# Patient Record
Sex: MALE | Race: WHITE | Employment: OTHER | URBAN - NONMETROPOLITAN AREA
[De-identification: names, ages, dates, MRNs, and addresses within clinical notes are randomized per-mention and may not be internally consistent; named-entity substitution may affect disease eponyms.]

---

## 2019-02-22 LAB
AVERAGE GLUCOSE: NORMAL
HBA1C MFR BLD: 7.1 %

## 2019-05-03 LAB
AVERAGE GLUCOSE: NORMAL
HBA1C MFR BLD: 6.8 %

## 2019-08-30 LAB
ALBUMIN SERPL-MCNC: NORMAL G/DL
ALP BLD-CCNC: NORMAL U/L
ALT SERPL-CCNC: NORMAL U/L
ANION GAP SERPL CALCULATED.3IONS-SCNC: NORMAL MMOL/L
AST SERPL-CCNC: NORMAL U/L
AVERAGE GLUCOSE: NORMAL
BILIRUB SERPL-MCNC: NORMAL MG/DL
BUN BLDV-MCNC: NORMAL MG/DL
CALCIUM SERPL-MCNC: NORMAL MG/DL
CHLORIDE BLD-SCNC: NORMAL MMOL/L
CO2: NORMAL
CREAT SERPL-MCNC: NORMAL MG/DL
GFR CALCULATED: NORMAL
GLUCOSE BLD-MCNC: NORMAL MG/DL
HBA1C MFR BLD: 6.4 %
POTASSIUM SERPL-SCNC: NORMAL MMOL/L
SODIUM BLD-SCNC: NORMAL MMOL/L
TOTAL PROTEIN: NORMAL

## 2019-12-13 LAB
AVERAGE GLUCOSE: NORMAL
CREATININE, URINE: NORMAL
HBA1C MFR BLD: 6.5 %
MICROALBUMIN/CREAT 24H UR: 3.3 MG/G{CREAT}
MICROALBUMIN/CREAT UR-RTO: NORMAL
PROSTATE SPECIFIC ANTIGEN: NORMAL

## 2020-03-12 LAB
ALBUMIN SERPL-MCNC: NORMAL G/DL
ALP BLD-CCNC: NORMAL U/L
ALT SERPL-CCNC: NORMAL U/L
ANION GAP SERPL CALCULATED.3IONS-SCNC: NORMAL MMOL/L
AST SERPL-CCNC: NORMAL U/L
AVERAGE GLUCOSE: NORMAL
BILIRUB SERPL-MCNC: NORMAL MG/DL
BUN BLDV-MCNC: NORMAL MG/DL
CALCIUM SERPL-MCNC: NORMAL MG/DL
CHLORIDE BLD-SCNC: NORMAL MMOL/L
CHOLESTEROL, TOTAL: NORMAL
CHOLESTEROL/HDL RATIO: NORMAL
CO2: NORMAL
CREAT SERPL-MCNC: NORMAL MG/DL
GFR CALCULATED: NORMAL
GLUCOSE BLD-MCNC: NORMAL MG/DL
HBA1C MFR BLD: 6.2 %
HDLC SERPL-MCNC: NORMAL MG/DL
LDL CHOLESTEROL CALCULATED: NORMAL
POTASSIUM SERPL-SCNC: NORMAL MMOL/L
SODIUM BLD-SCNC: NORMAL MMOL/L
TOTAL PROTEIN: NORMAL
TRIGL SERPL-MCNC: NORMAL MG/DL
VLDLC SERPL CALC-MCNC: NORMAL MG/DL

## 2020-06-04 LAB
BUN BLDV-MCNC: NORMAL MG/DL
CALCIUM SERPL-MCNC: NORMAL MG/DL
CHLORIDE BLD-SCNC: NORMAL MMOL/L
CO2: NORMAL
CREAT SERPL-MCNC: NORMAL MG/DL
GFR CALCULATED: NORMAL
GLUCOSE BLD-MCNC: NORMAL MG/DL
MAGNESIUM: NORMAL
POTASSIUM SERPL-SCNC: NORMAL MMOL/L
SODIUM BLD-SCNC: NORMAL MMOL/L

## 2020-06-09 RX ORDER — ASPIRIN/CALCIUM/MAG/ALUMINUM 325 MG
1 TABLET ORAL DAILY
COMMUNITY

## 2020-06-09 RX ORDER — ACETAMINOPHEN 325 MG/1
325 TABLET ORAL 3 TIMES DAILY
COMMUNITY

## 2020-06-09 RX ORDER — AZELASTINE HYDROCHLORIDE 137 UG/1
2 SPRAY, METERED NASAL DAILY
COMMUNITY

## 2020-06-09 RX ORDER — TRAMADOL HYDROCHLORIDE 50 MG/1
50 TABLET ORAL 3 TIMES DAILY
COMMUNITY
End: 2020-08-19 | Stop reason: SDUPTHER

## 2020-06-09 RX ORDER — DOCUSATE SODIUM 100 MG/1
100 CAPSULE, LIQUID FILLED ORAL
COMMUNITY

## 2020-06-09 RX ORDER — ALLOPURINOL 100 MG/1
100 TABLET ORAL DAILY
COMMUNITY

## 2020-06-09 RX ORDER — MENTHOL AND ZINC OXIDE .44; 20.625 G/100G; G/100G
1 OINTMENT TOPICAL 2 TIMES DAILY
COMMUNITY

## 2020-06-09 RX ORDER — ATORVASTATIN CALCIUM 20 MG/1
20 TABLET, FILM COATED ORAL DAILY
COMMUNITY

## 2020-06-09 RX ORDER — ENALAPRIL MALEATE 10 MG/1
10 TABLET ORAL DAILY
COMMUNITY

## 2020-06-09 RX ORDER — CLOTRIMAZOLE AND BETAMETHASONE DIPROPIONATE 10; .64 MG/G; MG/G
1 CREAM TOPICAL 2 TIMES DAILY
COMMUNITY
End: 2020-08-05 | Stop reason: ALTCHOICE

## 2020-06-09 RX ORDER — LEVOTHYROXINE SODIUM 0.1 MG/1
100 TABLET ORAL DAILY
COMMUNITY

## 2020-06-09 RX ORDER — FLUOXETINE HYDROCHLORIDE 40 MG/1
40 CAPSULE ORAL DAILY
COMMUNITY

## 2020-06-09 RX ORDER — OXYBUTYNIN CHLORIDE 5 MG/1
5 TABLET, EXTENDED RELEASE ORAL NIGHTLY
COMMUNITY

## 2020-06-09 RX ORDER — AMLODIPINE BESYLATE 10 MG/1
10 TABLET ORAL DAILY
COMMUNITY

## 2020-06-09 RX ORDER — FLUTICASONE PROPIONATE 50 MCG
2 SPRAY, SUSPENSION (ML) NASAL DAILY
COMMUNITY
End: 2021-08-09

## 2020-06-09 RX ORDER — FLUOXETINE HYDROCHLORIDE 20 MG/1
20 CAPSULE ORAL DAILY
COMMUNITY
End: 2022-10-04

## 2020-07-21 ENCOUNTER — TELEPHONE (OUTPATIENT)
Dept: FAMILY MEDICINE CLINIC | Age: 75
End: 2020-07-21

## 2020-07-21 RX ORDER — TRAMADOL HYDROCHLORIDE 50 MG/1
50 TABLET ORAL EVERY 8 HOURS PRN
Qty: 93 TABLET | Refills: 0 | Status: SHIPPED
Start: 2020-07-21 | End: 2020-07-21

## 2020-07-21 RX ORDER — TRAMADOL HYDROCHLORIDE 50 MG/1
50 TABLET ORAL EVERY 8 HOURS PRN
Qty: 93 TABLET | Refills: 3 | Status: SHIPPED
Start: 2020-07-21 | End: 2020-08-05

## 2020-08-05 ENCOUNTER — OFFICE VISIT (OUTPATIENT)
Dept: FAMILY MEDICINE CLINIC | Age: 75
End: 2020-08-05
Payer: MEDICARE

## 2020-08-05 VITALS
BODY MASS INDEX: 51.16 KG/M2 | HEART RATE: 90 BPM | DIASTOLIC BLOOD PRESSURE: 70 MMHG | HEIGHT: 61 IN | WEIGHT: 271 LBS | TEMPERATURE: 97.8 F | RESPIRATION RATE: 20 BRPM | OXYGEN SATURATION: 98 % | SYSTOLIC BLOOD PRESSURE: 128 MMHG

## 2020-08-05 PROBLEM — R26.9 UNSPECIFIED ABNORMALITIES OF GAIT AND MOBILITY: Status: ACTIVE | Noted: 2017-02-14

## 2020-08-05 PROBLEM — R94.31 ABNORMAL ELECTROCARDIOGRAM (ECG) (EKG): Status: ACTIVE | Noted: 2017-04-12

## 2020-08-05 PROCEDURE — G8510 SCR DEP NEG, NO PLAN REQD: HCPCS | Performed by: NURSE PRACTITIONER

## 2020-08-05 PROCEDURE — 99214 OFFICE O/P EST MOD 30 MIN: CPT | Performed by: NURSE PRACTITIONER

## 2020-08-05 PROCEDURE — 3288F FALL RISK ASSESSMENT DOCD: CPT | Performed by: NURSE PRACTITIONER

## 2020-08-05 RX ORDER — BETAMETHASONE DIPROPIONATE 0.5 MG/G
CREAM TOPICAL
Qty: 1 TUBE | Refills: 3 | Status: SHIPPED | OUTPATIENT
Start: 2020-08-05

## 2020-08-05 RX ORDER — SEMAGLUTIDE 1.34 MG/ML
0.5 INJECTION, SOLUTION SUBCUTANEOUS WEEKLY
COMMUNITY
Start: 2020-07-16

## 2020-08-05 SDOH — HEALTH STABILITY: MENTAL HEALTH: HOW OFTEN DO YOU HAVE A DRINK CONTAINING ALCOHOL?: NEVER

## 2020-08-05 ASSESSMENT — ENCOUNTER SYMPTOMS
SHORTNESS OF BREATH: 0
NAUSEA: 0
CONSTIPATION: 0
VOICE CHANGE: 0
DIARRHEA: 0
WHEEZING: 0
COUGH: 0
ABDOMINAL PAIN: 0
BACK PAIN: 0
VOMITING: 0
CHEST TIGHTNESS: 0
TROUBLE SWALLOWING: 0
BLOOD IN STOOL: 0

## 2020-08-05 ASSESSMENT — PATIENT HEALTH QUESTIONNAIRE - PHQ9
SUM OF ALL RESPONSES TO PHQ QUESTIONS 1-9: 0
SUM OF ALL RESPONSES TO PHQ QUESTIONS 1-9: 0
SUM OF ALL RESPONSES TO PHQ9 QUESTIONS 1 & 2: 0
2. FEELING DOWN, DEPRESSED OR HOPELESS: 0
1. LITTLE INTEREST OR PLEASURE IN DOING THINGS: 0

## 2020-08-05 NOTE — PROGRESS NOTES
tablet, Take 100 mcg by mouth Daily, Disp: , Rfl:   with no evidence of weight gain, palpitations, fatigue, difficulty swallowing, neck prominence or hair loss. Is better on medications and worsens with forgetting to take medications. Has been on medications religiously since last visit. Depression/Anxiety/PBA: Stable depression with generalized anxiety. Mild in intensity but well controlled on   Current Outpatient Medications:    acetaminophen (TYLENOL) 325 MG tablet, Take 650 mg by mouth 3 times daily, Disp: , Rfl:    traMADol (ULTRAM) 50 MG tablet, Take 50 mg by mouth 3 times daily. , Disp: , Rfl:    allopurinol (ZYLOPRIM) 100 MG tablet, Take 100 mg by mouth daily, Disp: , Rfl:    FLUoxetine (PROZAC) 40 MG capsule, Take 40 mg by mouth daily, Disp: , Rfl:   FLUoxetine (PROZAC) 20 MG capsule, Take 20 mg by mouth daily, Disp: , Rfl:   without symptoms, no weight gain, no increase in anxiety, no suicidal or homicidal ideation's no unexplained fatigue, or relationship difficulties relayed this visit. Review of Systems   Constitutional: Negative for activity change, chills, diaphoresis, fatigue, fever and unexpected weight change. HENT: Negative for trouble swallowing and voice change. Eyes: Negative for visual disturbance. Respiratory: Negative for cough, chest tightness, shortness of breath and wheezing. Cardiovascular: Negative for chest pain, palpitations and leg swelling. Gastrointestinal: Negative for abdominal pain, blood in stool, constipation, diarrhea, nausea and vomiting. Endocrine: Negative for polydipsia, polyphagia and polyuria. Genitourinary: Negative for dysuria, enuresis, frequency and hematuria. Musculoskeletal: Negative for arthralgias, back pain, gait problem, joint swelling, myalgias and neck stiffness. Skin: Negative for rash. Neurological: Negative for dizziness, seizures, syncope, facial asymmetry, weakness, light-headedness, numbness and headaches. Hematological: Does not bruise/bleed easily. Psychiatric/Behavioral: Negative for behavioral problems, confusion, hallucinations and suicidal ideas. The patient is not nervous/anxious. Current Outpatient Medications:     OZEMPIC, 0.25 OR 0.5 MG/DOSE, 2 MG/1.5ML SOPN, , Disp: , Rfl:     betamethasone dipropionate (DIPROLENE) 0.05 % cream, Apply topically 2 times daily. PRN for 14 days and then take 7 day break, Disp: 1 Tube, Rfl: 3    Azelastine HCl 137 MCG/SPRAY SOLN, 2 sprays by Nasal route daily, Disp: , Rfl:     atorvastatin (LIPITOR) 20 MG tablet, Take 20 mg by mouth daily, Disp: , Rfl:     levothyroxine (SYNTHROID) 100 MCG tablet, Take 100 mcg by mouth Daily, Disp: , Rfl:     acetaminophen (TYLENOL) 325 MG tablet, Take 650 mg by mouth 3 times daily, Disp: , Rfl:     traMADol (ULTRAM) 50 MG tablet, Take 50 mg by mouth 3 times daily. , Disp: , Rfl:     amLODIPine (NORVASC) 10 MG tablet, Take 10 mg by mouth daily, Disp: , Rfl:     enalapril (VASOTEC) 10 MG tablet, Take 10 mg by mouth daily, Disp: , Rfl:     allopurinol (ZYLOPRIM) 100 MG tablet, Take 100 mg by mouth daily, Disp: , Rfl:     FLUoxetine (PROZAC) 40 MG capsule, Take 40 mg by mouth daily, Disp: , Rfl:     fluticasone (FLONASE) 50 MCG/ACT nasal spray, 2 sprays by Each Nostril route daily, Disp: , Rfl:     oxybutynin (DITROPAN-XL) 5 MG extended release tablet, Take 5 mg by mouth nightly, Disp: , Rfl:     docusate sodium (COLACE) 100 MG capsule, Take 100 mg by mouth 1 CAPSULE EVERY MORNING AND 2 CAPSULES NIGHTLY, Disp: , Rfl:     Aspirin Buf,QlMpa-JrVck-UrJto, (BUFFERED ASPIRIN) 325 MG TABS, Take 1 tablet by mouth daily, Disp: , Rfl:     FLUoxetine (PROZAC) 20 MG capsule, Take 20 mg by mouth daily, Disp: , Rfl:     diclofenac sodium (VOLTAREN) 1 % GEL, Apply 4 g topically 4 times daily, Disp: , Rfl:     menthol-zinc oxide (CALMOSEPTINE) 0.44-20.625 % OINT ointment, Apply 1 applicator topically 2 times daily Max 30 ml per day. , Disp: , Rfl:   No Known Allergies    Past Medical History:   Diagnosis Date    Ankle sprain     LEFT    Chronic kidney disease (CKD), stage III (moderate) (HCC)     Constipation     Contusion of knee     Diabetes mellitus (HCC)     DJD (degenerative joint disease)     Encounter for long-term (current) use of other medications     Epidermal cyst     Facies suggestive of Down syndrome     Hearing impaired     Hyperlipidemia     Hypertension     Hypothyroid     Hypothyroid     Knee pain     Mental deficiency     Obesity     Otitis externa     Otitis media     Urge urinary incontinence      History reviewed. No pertinent surgical history. History reviewed. No pertinent family history.   Social History     Socioeconomic History    Marital status: Single     Spouse name: Not on file    Number of children: Not on file    Years of education: Not on file    Highest education level: Not on file   Occupational History    Not on file   Social Needs    Financial resource strain: Not on file    Food insecurity     Worry: Not on file     Inability: Not on file    Transportation needs     Medical: Not on file     Non-medical: Not on file   Tobacco Use    Smoking status: Never Smoker    Smokeless tobacco: Never Used   Substance and Sexual Activity    Alcohol use: Never     Frequency: Never    Drug use: Not on file    Sexual activity: Not on file   Lifestyle    Physical activity     Days per week: Not on file     Minutes per session: Not on file    Stress: Not on file   Relationships    Social connections     Talks on phone: Not on file     Gets together: Not on file     Attends [employer] service: Not on file     Active member of club or organization: Not on file     Attends meetings of clubs or organizations: Not on file     Relationship status: Not on file    Intimate partner violence     Fear of current or ex partner: Not on file     Emotionally abused: Not on file     Physically abused: Not on file     Forced sexual activity: Not on file   Other Topics Concern    Not on file   Social History Narrative    Not on file       Vitals:    08/05/20 0913   BP: 128/70   Pulse: 90   Resp: 20   Temp: 97.8 °F (36.6 °C)   TempSrc: Temporal   SpO2: 98%   Weight: 271 lb (122.9 kg)   Height: 5' 1\" (1.549 m)       Physical Exam  Vitals signs and nursing note reviewed. Constitutional:       Appearance: Normal appearance. HENT:      Head: Normocephalic. Right Ear: Tympanic membrane and ear canal normal. There is no impacted cerumen. Left Ear: Tympanic membrane and ear canal normal. There is no impacted cerumen. Nose: Nose normal.      Mouth/Throat:      Mouth: Mucous membranes are dry. Eyes:      Extraocular Movements: Extraocular movements intact. Pupils: Pupils are equal, round, and reactive to light. Neck:      Musculoskeletal: No neck rigidity or muscular tenderness. Vascular: No carotid bruit. Cardiovascular:      Rate and Rhythm: Normal rate and regular rhythm. Pulses: Normal pulses. Heart sounds: Normal heart sounds. No murmur. No friction rub. No gallop. Pulmonary:      Effort: Pulmonary effort is normal. No respiratory distress. Breath sounds: Normal breath sounds. No stridor. No wheezing, rhonchi or rales. Chest:      Chest wall: No tenderness. Abdominal:      General: Bowel sounds are normal. There is no distension. Palpations: Abdomen is soft. Musculoskeletal:         General: No swelling, tenderness, deformity or signs of injury. Right lower leg: No edema. Left lower leg: No edema. Lymphadenopathy:      Cervical: No cervical adenopathy. Skin:     General: Skin is warm and dry. Capillary Refill: Capillary refill takes 2 to 3 seconds. Findings: No bruising, lesion or rash. Neurological:      General: No focal deficit present. Mental Status: He is alert and oriented to person, place, and time.       Motor: Weakness present. Gait: Gait abnormal.      Comments: Uses a walker  Lower extremity weakness at times   Psychiatric:         Attention and Perception: Attention normal.         Mood and Affect: Mood normal.         Behavior: Behavior normal.         Thought Content: Thought content does not include homicidal or suicidal ideation. Thought content does not include homicidal or suicidal plan. Assessment and Plan:  Marbella Serna was seen today for hypertension. Diagnoses and all orders for this visit:    Type 2 diabetes mellitus with stage 3 chronic kidney disease, without long-term current use of insulin (HCC)  -     CBC Auto Differential; Future  -     Comprehensive Metabolic Panel; Future  -     Hemoglobin A1C; Future    Chronic bilateral low back pain without sciatica    Essential hypertension  -     TSH without Reflex; Future  -     Urinalysis; Future    Vitamin D deficiency    Stage 3 chronic kidney disease ([facility] [address]  Comments:  Continue expectant management  Orders:  -     Comprehensive Metabolic Panel; Future    Familial hypercholesterolemia  -     Lipid Panel; Future    Screening for prostate cancer  Comments:  Next PSA for after December 13, 2020  Orders:  -     PSA, Diagnostic; Future    Facies suggestive of Down syndrome    Other specified hypothyroidism    Class 3 severe obesity due to excess calories with serious comorbidity and body mass index (BMI) of 50.0 to 59.9 in [facility])  Comments:  Patient is on a low-fat low sodium low potassium diet    Primary osteoarthritis involving multiple joints  Comments:  Patient controlled on tramadol and as needed medications    Mental deficiency    Screening for colorectal cancer  Comments:  Ordered a Cologuard  Orders:  -     Cologuard (For External Results Only); Future    Dermatitis  Comments:   We will try him on betamethasone twice daily for 14 days if this does not improve the situation or if it worsens we will need to stop the betamethasone and add     Other orders  -     betamethasone dipropionate (DIPROLENE) 0.05 % cream; Apply topically 2 times daily. PRN for 14 days and then take 7 day break        Return in about 4 months (around 12/14/2020) for with labs . USPTF:    ([place] 2016) Colorectal Cancer: Screening --Adults aged 48 to 76 years  Grade: A (Recommended) recommends screening for colorectal cancer starting at age 48 years and continuing until age 76 years. (Not at risk) HIV: Screening - Adolescents and Adults  Grade: A (Recommended) recommends that clinicians screen for HIV infection in ages 13 to 72 years. (2020) High Blood Pressure: Screening and Home Monitoring -- Adults  Grade: A (Recommended) recommends screening for high blood pressure in ages 25 years or older. obtain measurements outside of the clinical setting for diagnostic confirmation before starting treatment. Annual screening for adults aged 36 years or older or those who are at increased risk for blood pressure    (2020)  Lipid Disorders in Adults: Screening -- Men 28 and Older  Grade: A (Recommended) recommends screening men aged 28 and older for lipid disorders. (Nondrinker) Alcohol Misuse: Screening and Behavioral Counseling Interventions in Primary Care -- Adults  Grade: B (Recommended) recommends that clinicians screen adults aged 25 years or older for alcohol misuse and provide persons engaged in risky or hazardous drinking with brief behavioral counseling interventions to reduce alcohol misuse. (2020) Abnormal Blood Glucose and Type 2 Diabetes Mellitus: Screening -- Adults aged 36 to 79 years who are overweight or obese Grade: B (Recommended)    (Never smoked) Abdominal Aortic Aneurysm: Screening -- Men Ages 72 to 76 Years Who Have Ever Smoked. Grade: B (Recommended) recommends one-time screening for abdominal aortic aneurysm (AAA) with ultrasonography in men ages 72 to 76 years who have ever smoked.      (Discussed today)  Obesity: Screening for and Management of-- All Adults  Grade: B(Recommended) recommends screening all adults for obesity. Clinicians should offer or refer patients with a body mass index (BMI) of 30 kg/m2 or higher to intensive, multicomponent behavioral interventions. (Never smoked)  Lung Cancer: Screening -- Adults Ages 46-80 who have a 30 pack-year smoking history and currently smoke or have quit within the past 15 years  Grade: B (Recommended) recommends annual screening for lung cancer with low-dose computed tomography (LDCT) in adults aged 54 to [de-identified] years who have a 30 pack-year smoking history and currently smoke or have quit within the past 15 years. Screening should be discontinued once a person has not smoked for 15 years or develops a health problem that substantially limits life expectancy or the ability or willingness to have curative lung surgery. (Not at high risk) Hepatitis C Virus Infection: Screening--Adults at High Risk and Adults born between 1945 and 1965  Grade: B (Recommended) recommends screening for hepatitis C virus (HCV) infection in persons at high risk for infection. The USPSTF also recommends offering 1-time screening for HCV infection to adults born between 80 and 1965. (High risk)  Fall Prevention -- Exercise/Physical Therapy: Community-dwelling Adults 72 Years or Older, Increased Risk for Falls   Grade: B (Recommended) recommends exercise or physical therapy to prevent falls in community-dwelling adults aged 72 years or older who are at increased risk for falls. (Negative)  Depression: Screening -- General adult population, including pregnant and postpartum women  Grade: B(Recommended) recommends screening for depression in the general adult population,  Screening should be implemented with adequate systems in place to ensure accurate diagnosis, effective treatment, and appropriate follow-up.     (2020) Glaucoma: Screening - Adults and Diabetic Eye Exam     (2020) Vitamin D Deficiency: Screening -- (2020) Thyroid Dysfunction: Screening --      (2020) Skin Cancer: Screening --Asymptomatic adults  Grade:  I(Uncertain)     (Done) Carotid Artery Stenosis: Screening -- Adults  Grade: D (Not Recommended)     (2020) Coronary Heart Disease: Screening with Electrocardiography--Adults at Low Risk  Grade: D (Not Recommended) echocardiogram done in 2017 along with a chemical stress test    (2019) Prostate Cancer: Prostate-Specific Antigen (PSA)-Based Screening -- All Men   PSA 1.8 December 13, 2019  PSA 2.0 November 28, 2018  PSA 1.9 September 2017  PSA 1.4 July 2017  PSA 1.2 July 2016         Seen By:  DARREN Javier - CNP

## 2020-08-19 RX ORDER — TRAMADOL HYDROCHLORIDE 50 MG/1
50 TABLET ORAL 3 TIMES DAILY
Qty: 90 TABLET | Refills: 0 | Status: SHIPPED
Start: 2020-08-19 | End: 2021-01-19 | Stop reason: SDUPTHER

## 2020-08-19 NOTE — PROGRESS NOTES
Called Sahra at [facility]. Pt. Has appt. With Dr Roach Mediate the end of this month as his 3 mo. Check up. But I did fax the Cologuard order to her so she has it if needed.

## 2020-08-28 ENCOUNTER — TELEPHONE (OUTPATIENT)
Dept: FAMILY MEDICINE CLINIC | Age: 75
End: 2020-08-28

## 2020-08-28 NOTE — TELEPHONE ENCOUNTER
I called Sahra (nurse) [phone] regarding overdue results for cologuard. Per Anaya she just got the order patient has to sign the form and she will return to the office and [facility] So I will extend the date.

## 2020-09-21 RX ORDER — TRAMADOL HYDROCHLORIDE 50 MG/1
50 TABLET ORAL 3 TIMES DAILY
COMMUNITY
End: 2020-09-21 | Stop reason: SDUPTHER

## 2020-09-21 RX ORDER — TRAMADOL HYDROCHLORIDE 50 MG/1
50 TABLET ORAL 3 TIMES DAILY
Qty: 90 TABLET | Refills: 0 | Status: SHIPPED
Start: 2020-09-21 | End: 2020-10-19 | Stop reason: SDUPTHER

## 2020-10-06 ENCOUNTER — TELEPHONE (OUTPATIENT)
Dept: FAMILY MEDICINE CLINIC | Age: 75
End: 2020-10-06

## 2020-10-06 NOTE — TELEPHONE ENCOUNTER
Spoke with Sahra (nurse) daquan is overdue and she stated he just had it done in January. That's correct 01- results abstracted in Replaced by [facility] [address]. Will cancel the order.

## 2020-10-19 RX ORDER — TRAMADOL HYDROCHLORIDE 50 MG/1
50 TABLET ORAL 3 TIMES DAILY
Qty: 90 TABLET | Refills: 0 | Status: SHIPPED
Start: 2020-10-19 | End: 2020-11-18 | Stop reason: SDUPTHER

## 2020-10-20 ENCOUNTER — HOSPITAL ENCOUNTER (OUTPATIENT)
Age: 75
Discharge: HOME | End: 2020-10-22
Payer: MEDICARE

## 2020-10-20 LAB
ANION GAP SERPL CALCULATED.3IONS-SCNC: 19 MMOL/L (ref 7–16)
BUN BLDV-MCNC: 23 MG/DL (ref 8–23)
CALCIUM SERPL-MCNC: 9.7 MG/DL (ref 8.6–10.2)
CHLORIDE BLD-SCNC: 107 MMOL/L (ref 98–107)
CO2: 18 MMOL/L (ref 22–29)
CREAT SERPL-MCNC: 1.6 MG/DL (ref 0.7–1.2)
GFR AFRICAN AMERICAN: 51
GFR NON-AFRICAN AMERICAN: 42 ML/MIN/1.73
GLUCOSE BLD-MCNC: 116 MG/DL (ref 74–99)
MAGNESIUM: 1.8 MG/DL (ref 1.6–2.6)
PHOSPHORUS: 4 MG/DL (ref 2.5–4.5)
POTASSIUM SERPL-SCNC: 5 MMOL/L (ref 3.5–5)
SODIUM BLD-SCNC: 144 MMOL/L (ref 132–146)
VITAMIN D 25-HYDROXY: 15 NG/ML (ref 30–100)

## 2020-10-20 PROCEDURE — 83735 ASSAY OF MAGNESIUM: CPT

## 2020-10-20 PROCEDURE — 84100 ASSAY OF PHOSPHORUS: CPT

## 2020-10-20 PROCEDURE — 36415 COLL VENOUS BLD VENIPUNCTURE: CPT

## 2020-10-20 PROCEDURE — 82306 VITAMIN D 25 HYDROXY: CPT

## 2020-10-20 PROCEDURE — 80048 BASIC METABOLIC PNL TOTAL CA: CPT

## 2020-10-28 ENCOUNTER — TELEPHONE (OUTPATIENT)
Dept: FAMILY MEDICINE CLINIC | Age: 75
End: 2020-10-28

## 2020-11-18 RX ORDER — TRAMADOL HYDROCHLORIDE 50 MG/1
50 TABLET ORAL 3 TIMES DAILY
Qty: 93 TABLET | Refills: 0 | Status: SHIPPED
Start: 2020-11-18 | End: 2020-12-15 | Stop reason: SDUPTHER

## 2020-11-18 NOTE — TELEPHONE ENCOUNTER
[facility] pharmacy sent a fax requesting refill of pt's tramadol. Medication is fixed for 93 days as requested by pharmacy and pended for Sana Viera CNP to approve and sign. Fax is scanned in and attached to this documentation.

## 2020-12-15 RX ORDER — TRAMADOL HYDROCHLORIDE 50 MG/1
50 TABLET ORAL 3 TIMES DAILY
Qty: 93 TABLET | Refills: 0 | Status: SHIPPED
Start: 2020-12-15 | End: 2021-02-15 | Stop reason: SDUPTHER

## 2020-12-22 ENCOUNTER — TELEPHONE (OUTPATIENT)
Dept: FAMILY MEDICINE CLINIC | Age: 75
End: 2020-12-22

## 2020-12-29 ENCOUNTER — NURSE ONLY (OUTPATIENT)
Dept: FAMILY MEDICINE CLINIC | Age: 75
End: 2020-12-29
Payer: MEDICARE

## 2020-12-29 DIAGNOSIS — Z12.5 SCREENING FOR PROSTATE CANCER: ICD-10-CM

## 2020-12-29 DIAGNOSIS — E78.01 FAMILIAL HYPERCHOLESTEROLEMIA: ICD-10-CM

## 2020-12-29 DIAGNOSIS — N18.30 TYPE 2 DIABETES MELLITUS WITH STAGE 3 CHRONIC KIDNEY DISEASE, WITHOUT LONG-TERM CURRENT USE OF INSULIN (HCC): ICD-10-CM

## 2020-12-29 DIAGNOSIS — E11.22 TYPE 2 DIABETES MELLITUS WITH STAGE 3 CHRONIC KIDNEY DISEASE, WITHOUT LONG-TERM CURRENT USE OF INSULIN (HCC): ICD-10-CM

## 2020-12-29 DIAGNOSIS — N18.30 STAGE 3 CHRONIC KIDNEY DISEASE (HCC): ICD-10-CM

## 2020-12-29 DIAGNOSIS — I10 ESSENTIAL HYPERTENSION: ICD-10-CM

## 2020-12-29 LAB
ALBUMIN SERPL-MCNC: 3.9 G/DL (ref 3.5–5.2)
ALP BLD-CCNC: 119 U/L (ref 40–129)
ALT SERPL-CCNC: 16 U/L (ref 0–40)
ANION GAP SERPL CALCULATED.3IONS-SCNC: 15 MMOL/L (ref 7–16)
AST SERPL-CCNC: 21 U/L (ref 0–39)
BASOPHILS ABSOLUTE: 0.06 E9/L (ref 0–0.2)
BASOPHILS RELATIVE PERCENT: 0.7 % (ref 0–2)
BILIRUB SERPL-MCNC: 0.5 MG/DL (ref 0–1.2)
BUN BLDV-MCNC: 28 MG/DL (ref 8–23)
CALCIUM SERPL-MCNC: 9.7 MG/DL (ref 8.6–10.2)
CHLORIDE BLD-SCNC: 104 MMOL/L (ref 98–107)
CHOLESTEROL, TOTAL: 124 MG/DL (ref 0–199)
CO2: 20 MMOL/L (ref 22–29)
CREAT SERPL-MCNC: 1.5 MG/DL (ref 0.7–1.2)
EOSINOPHILS ABSOLUTE: 0.2 E9/L (ref 0.05–0.5)
EOSINOPHILS RELATIVE PERCENT: 2.3 % (ref 0–6)
GFR AFRICAN AMERICAN: 55
GFR NON-AFRICAN AMERICAN: 46 ML/MIN/1.73
GLUCOSE BLD-MCNC: 90 MG/DL (ref 74–99)
HBA1C MFR BLD: 6.1 % (ref 4–5.6)
HCT VFR BLD CALC: 48 % (ref 37–54)
HDLC SERPL-MCNC: 41 MG/DL
HEMOGLOBIN: 14.9 G/DL (ref 12.5–16.5)
IMMATURE GRANULOCYTES #: 0.04 E9/L
IMMATURE GRANULOCYTES %: 0.5 % (ref 0–5)
LDL CHOLESTEROL CALCULATED: 70 MG/DL (ref 0–99)
LYMPHOCYTES ABSOLUTE: 1.99 E9/L (ref 1.5–4)
LYMPHOCYTES RELATIVE PERCENT: 23.2 % (ref 20–42)
MCH RBC QN AUTO: 32.2 PG (ref 26–35)
MCHC RBC AUTO-ENTMCNC: 31 % (ref 32–34.5)
MCV RBC AUTO: 103.7 FL (ref 80–99.9)
MONOCYTES ABSOLUTE: 0.85 E9/L (ref 0.1–0.95)
MONOCYTES RELATIVE PERCENT: 9.9 % (ref 2–12)
NEUTROPHILS ABSOLUTE: 5.45 E9/L (ref 1.8–7.3)
NEUTROPHILS RELATIVE PERCENT: 63.4 % (ref 43–80)
PDW BLD-RTO: 13.7 FL (ref 11.5–15)
PLATELET # BLD: 274 E9/L (ref 130–450)
PMV BLD AUTO: 9.5 FL (ref 7–12)
POTASSIUM SERPL-SCNC: 4.8 MMOL/L (ref 3.5–5)
PROSTATE SPECIFIC ANTIGEN: 1.75 NG/ML (ref 0–4)
RBC # BLD: 4.63 E12/L (ref 3.8–5.8)
SODIUM BLD-SCNC: 139 MMOL/L (ref 132–146)
TOTAL PROTEIN: 7.4 G/DL (ref 6.4–8.3)
TRIGL SERPL-MCNC: 66 MG/DL (ref 0–149)
TSH SERPL DL<=0.05 MIU/L-ACNC: 2.02 UIU/ML (ref 0.27–4.2)
VLDLC SERPL CALC-MCNC: 13 MG/DL
WBC # BLD: 8.6 E9/L (ref 4.5–11.5)

## 2020-12-29 PROCEDURE — 36415 COLL VENOUS BLD VENIPUNCTURE: CPT | Performed by: NURSE PRACTITIONER

## 2020-12-29 PROCEDURE — 81002 URINALYSIS NONAUTO W/O SCOPE: CPT | Performed by: NURSE PRACTITIONER

## 2020-12-29 PROCEDURE — 99999 PR OFFICE/OUTPT VISIT,PROCEDURE ONLY: CPT | Performed by: NURSE PRACTITIONER

## 2020-12-29 PROCEDURE — 82044 UR ALBUMIN SEMIQUANTITATIVE: CPT | Performed by: NURSE PRACTITIONER

## 2020-12-31 LAB
BILIRUBIN, POC: ABNORMAL
BLOOD URINE, POC: ABNORMAL
CLARITY, POC: ABNORMAL
COLOR, POC: YELLOW
CREATININE URINE POCT: 50
GLUCOSE URINE, POC: ABNORMAL
KETONES, POC: ABNORMAL
LEUKOCYTE EST, POC: ABNORMAL
MICROALBUMIN/CREAT 24H UR: 30 MG/G{CREAT}
MICROALBUMIN/CREAT UR-RTO: 30
NITRITE, POC: ABNORMAL
PH, POC: 6.5
PROTEIN, POC: ABNORMAL
SPECIFIC GRAVITY, POC: 1.02
UROBILINOGEN, POC: ABNORMAL

## 2021-01-14 ENCOUNTER — OFFICE VISIT (OUTPATIENT)
Dept: FAMILY MEDICINE CLINIC | Age: 76
End: 2021-01-14
Payer: MEDICARE

## 2021-01-14 VITALS
DIASTOLIC BLOOD PRESSURE: 80 MMHG | HEIGHT: 61 IN | HEART RATE: 115 BPM | WEIGHT: 277.8 LBS | SYSTOLIC BLOOD PRESSURE: 132 MMHG | TEMPERATURE: 97.1 F | BODY MASS INDEX: 52.45 KG/M2 | OXYGEN SATURATION: 97 %

## 2021-01-14 DIAGNOSIS — Q90.9: ICD-10-CM

## 2021-01-14 DIAGNOSIS — Z11.59 ENCOUNTER FOR HEPATITIS C SCREENING TEST FOR LOW RISK PATIENT: ICD-10-CM

## 2021-01-14 DIAGNOSIS — E11.22 TYPE 2 DIABETES MELLITUS WITH STAGE 3B CHRONIC KIDNEY DISEASE, WITHOUT LONG-TERM CURRENT USE OF INSULIN (HCC): Primary | ICD-10-CM

## 2021-01-14 DIAGNOSIS — Z11.4 SCREENING FOR HIV WITHOUT PRESENCE OF RISK FACTORS: ICD-10-CM

## 2021-01-14 DIAGNOSIS — N18.32 STAGE 3B CHRONIC KIDNEY DISEASE (HCC): ICD-10-CM

## 2021-01-14 DIAGNOSIS — E55.9 VITAMIN D DEFICIENCY: ICD-10-CM

## 2021-01-14 DIAGNOSIS — I12.9 HYPERTENSIVE RENAL DISEASE: ICD-10-CM

## 2021-01-14 DIAGNOSIS — R01.1 CARDIAC MURMUR, UNSPECIFIED: ICD-10-CM

## 2021-01-14 DIAGNOSIS — E66.01 CLASS 3 SEVERE OBESITY DUE TO EXCESS CALORIES WITH SERIOUS COMORBIDITY AND BODY MASS INDEX (BMI) OF 50.0 TO 59.9 IN ADULT (HCC): ICD-10-CM

## 2021-01-14 DIAGNOSIS — E03.8 OTHER SPECIFIED HYPOTHYROIDISM: ICD-10-CM

## 2021-01-14 DIAGNOSIS — R00.0 TACHYCARDIA: ICD-10-CM

## 2021-01-14 DIAGNOSIS — M15.9 PRIMARY OSTEOARTHRITIS INVOLVING MULTIPLE JOINTS: ICD-10-CM

## 2021-01-14 DIAGNOSIS — E78.01 FAMILIAL HYPERCHOLESTEROLEMIA: ICD-10-CM

## 2021-01-14 DIAGNOSIS — R26.9 UNSPECIFIED ABNORMALITIES OF GAIT AND MOBILITY: ICD-10-CM

## 2021-01-14 DIAGNOSIS — N18.32 TYPE 2 DIABETES MELLITUS WITH STAGE 3B CHRONIC KIDNEY DISEASE, WITHOUT LONG-TERM CURRENT USE OF INSULIN (HCC): Primary | ICD-10-CM

## 2021-01-14 PROBLEM — N18.30 STAGE 3 CHRONIC KIDNEY DISEASE (HCC): Status: ACTIVE | Noted: 2020-10-23

## 2021-01-14 PROCEDURE — G8484 FLU IMMUNIZE NO ADMIN: HCPCS | Performed by: NURSE PRACTITIONER

## 2021-01-14 PROCEDURE — G8417 CALC BMI ABV UP PARAM F/U: HCPCS | Performed by: NURSE PRACTITIONER

## 2021-01-14 PROCEDURE — 1036F TOBACCO NON-USER: CPT | Performed by: NURSE PRACTITIONER

## 2021-01-14 PROCEDURE — 81002 URINALYSIS NONAUTO W/O SCOPE: CPT | Performed by: NURSE PRACTITIONER

## 2021-01-14 PROCEDURE — 4040F PNEUMOC VAC/ADMIN/RCVD: CPT | Performed by: NURSE PRACTITIONER

## 2021-01-14 PROCEDURE — 3017F COLORECTAL CA SCREEN DOC REV: CPT | Performed by: NURSE PRACTITIONER

## 2021-01-14 PROCEDURE — G8427 DOCREV CUR MEDS BY ELIG CLIN: HCPCS | Performed by: NURSE PRACTITIONER

## 2021-01-14 PROCEDURE — 99214 OFFICE O/P EST MOD 30 MIN: CPT | Performed by: NURSE PRACTITIONER

## 2021-01-14 PROCEDURE — 1123F ACP DISCUSS/DSCN MKR DOCD: CPT | Performed by: NURSE PRACTITIONER

## 2021-01-14 PROCEDURE — 2022F DILAT RTA XM EVC RTNOPTHY: CPT | Performed by: NURSE PRACTITIONER

## 2021-01-14 PROCEDURE — 3046F HEMOGLOBIN A1C LEVEL >9.0%: CPT | Performed by: NURSE PRACTITIONER

## 2021-01-14 ASSESSMENT — ENCOUNTER SYMPTOMS
VOMITING: 0
TROUBLE SWALLOWING: 0
WHEEZING: 0
CONSTIPATION: 0
COUGH: 0
SHORTNESS OF BREATH: 0
DIARRHEA: 0
CHEST TIGHTNESS: 0
ABDOMINAL PAIN: 0
BACK PAIN: 0
NAUSEA: 0
VOICE CHANGE: 0
BLOOD IN STOOL: 0

## 2021-01-14 ASSESSMENT — PATIENT HEALTH QUESTIONNAIRE - PHQ9
SUM OF ALL RESPONSES TO PHQ QUESTIONS 1-9: 0
SUM OF ALL RESPONSES TO PHQ9 QUESTIONS 1 & 2: 0
SUM OF ALL RESPONSES TO PHQ QUESTIONS 1-9: 0
1. LITTLE INTEREST OR PLEASURE IN DOING THINGS: 0
2. FEELING DOWN, DEPRESSED OR HOPELESS: 0

## 2021-01-14 NOTE — PROGRESS NOTES
20  Glo Shepherd : 1945 Sex: male  Age: 77 y.o. Chief Complaint   Patient presents with   Raynelle Graves Hypothyroidism    Results     Review bloodwork results       This is a very pleasant 50-year-old developmentally delayed white male who is here today for lab review and medication follow-up. He denies any complaints at this time he is doing rather well. No Covid exposure no nausea, vomiting, diarrhea or abdominal pain reported. No chest pain reported. No shortness of breath      CHRONIC CONDITION:    DM: Mild intensity but controlled on   OZEMPIC, 0.25 OR 0.5 MG/DOSE, 2 MG/1.5ML SOPN, , Disp: , Rfl:  remains without symptoms, no weight loss, no increase in thirst, or urination and no low blood sugar incidents. No reports of poor circulation or issues with feet ulceration or injury. Is better when compliant with diet and medications. Vision Exam Up to Date:                             2020  Currently on an ACE  :                                Y  Most Recent Hgb A1c <7:                           Y   AIC 6.1   Microalbumin Test Reviewed: Y   Foot Exam Up To Date:                               2020  Flu Shot Up To Date:                                   2020  Has patient been seen by Neurology           N   Currently on a Statin                                    Y       HTN: Stable hypertension, controlled on    amLODIPine (NORVASC) 10 MG tablet, Take 10 mg by mouth daily, Disp: , Rfl:   enalapril (VASOTEC) 10 MG tablet, Take 10 mg by mouth daily, Disp: , Rfl:  remains at a mild intensity but overall good control, without symptoms, no ringing in the ears, no headaches and no nose bleeds. Better on medications. CAD and CHF: Patient currently not on a Betablocker but is on an ACE for LVSD. Tobacco Use: N    Flu Shot Up To Date: Y     Meds Listed in Medical Record:  Y     Hyperlipidemia: Mild in intensity but controlled on    atorvastatin (LIPITOR) 20 MG tablet, Take 20 mg by mouth daily, Disp: , Rfl:  without symptoms, no complications with dietary treatment regimen reporting no side effects or intolereances. Compliant with treatment and diet. No muscle aches, new joint pains or abd pain. Hypothyroid: Remains controlled, without symptoms, on    levothyroxine (SYNTHROID) 100 MCG tablet, Take 100 mcg by mouth Daily, Disp: , Rfl:   with no evidence of weight gain, palpitations, fatigue, difficulty swallowing, neck prominence or hair loss. Is better on medications and worsens with forgetting to take medications. Has been on medications religiously since last visit. Depression/Anxiety/PBA: Stable depression with generalized anxiety. Mild in intensity but well controlled on   Current Outpatient Medications:    acetaminophen (TYLENOL) 325 MG tablet, Take 650 mg by mouth 3 times daily, Disp: , Rfl:    traMADol (ULTRAM) 50 MG tablet, Take 50 mg by mouth 3 times daily. , Disp: , Rfl:    allopurinol (ZYLOPRIM) 100 MG tablet, Take 100 mg by mouth daily, Disp: , Rfl:    FLUoxetine (PROZAC) 40 MG capsule, Take 40 mg by mouth daily, Disp: , Rfl:   FLUoxetine (PROZAC) 20 MG capsule, Take 20 mg by mouth daily, Disp: , Rfl:   without symptoms, no weight gain, no increase in anxiety, no suicidal or homicidal ideation's no unexplained fatigue, or relationship difficulties relayed this visit. Review of Systems   Constitutional: Negative for activity change, chills, diaphoresis, fatigue, fever and unexpected weight change. HENT: Negative for trouble swallowing and voice change. Eyes: Negative for visual disturbance. Respiratory: Negative for cough, chest tightness, shortness of breath and wheezing. Cardiovascular: Negative for chest pain, palpitations and leg swelling. Gastrointestinal: Negative for abdominal pain, blood in stool, constipation, diarrhea, nausea and vomiting. Endocrine: Negative for polydipsia, polyphagia and polyuria. Genitourinary: Negative for dysuria, enuresis, frequency and hematuria. Musculoskeletal: Negative for arthralgias, back pain, gait problem, joint swelling, myalgias and neck stiffness. Skin: Negative for rash. Neurological: Negative for dizziness, seizures, syncope, facial asymmetry, weakness, light-headedness, numbness and headaches. Hematological: Does not bruise/bleed easily. Psychiatric/Behavioral: Negative for behavioral problems, confusion, hallucinations and suicidal ideas. The patient is not nervous/anxious. Current Outpatient Medications:     traMADol (ULTRAM) 50 MG tablet, Take 1 tablet by mouth 3 times daily for 30 days. , Disp: 93 tablet, Rfl: 0    OZEMPIC, 0.25 OR 0.5 MG/DOSE, 2 MG/1.5ML SOPN, Inject 0.5 mg into the skin once a week , Disp: , Rfl:     betamethasone dipropionate (DIPROLENE) 0.05 % cream, Apply topically 2 times daily.  PRN for 14 days and then take 7 day break, Disp: 1 Tube, Rfl: 3    Azelastine HCl 137 MCG/SPRAY SOLN, 2 sprays by Nasal route daily In each nostril, Disp: , Rfl:     atorvastatin (LIPITOR) 20 MG tablet, Take 20 mg by mouth daily, Disp: , Rfl:     levothyroxine (SYNTHROID) 100 MCG tablet, Take 100 mcg by mouth Daily, Disp: , Rfl:     acetaminophen (TYLENOL) 325 MG tablet, Take 325 mg by mouth 3 times daily , Disp: , Rfl:     amLODIPine (NORVASC) 10 MG tablet, Take 10 mg by mouth daily, Disp: , Rfl:     enalapril (VASOTEC) 10 MG tablet, Take 10 mg by mouth daily, Disp: , Rfl:     allopurinol (ZYLOPRIM) 100 MG tablet, Take 100 mg by mouth daily, Disp: , Rfl:     FLUoxetine (PROZAC) 40 MG capsule, Take 40 mg by mouth daily Take with 20 mg capsuleto equal 60 mg daily, Disp: , Rfl:     fluticasone (FLONASE) 50 MCG/ACT nasal spray, 2 sprays by Each Nostril route daily, Disp: , Rfl:     oxybutynin (DITROPAN-XL) 5 MG extended release tablet, Take 5 mg by mouth nightly, Disp: , Rfl:     docusate sodium (COLACE) 100 MG capsule, Take 100 mg by mouth 1 CAPSULE EVERY MORNING AND 2 CAPSULES NIGHTLY, Disp: , Rfl:     Aspirin Buf,[id number]-HwAip, (BUFFERED ASPIRIN) 325 MG TABS, Take 1 tablet by mouth daily, Disp: , Rfl:     FLUoxetine (PROZAC) 20 MG capsule, Take 20 mg by mouth daily Take with 40 mg capsule to equal 60mg daily, Disp: , Rfl:     diclofenac sodium (VOLTAREN) 1 % GEL, Apply 4 g topically 4 times daily, Disp: , Rfl:     menthol-zinc oxide (CALMOSEPTINE) 0.44-20.625 % OINT ointment, Apply 1 applicator topically 2 times daily Prn. Max 30 ml per day., Disp: , Rfl:   No Known Allergies    Past Medical History:   Diagnosis Date    Ankle sprain     LEFT    Chronic kidney disease (CKD), stage III (moderate)     Constipation     Contusion of knee     Diabetes mellitus (HCC)     DJD (degenerative joint disease)     Encounter for long-term (current) use of other medications     Epidermal cyst     Facies suggestive of Down syndrome     Hearing impaired     Hyperlipidemia     Hypertension     Hypothyroid     Hypothyroid     Knee pain     Mental deficiency     Obesity     Otitis externa     Otitis media     Urge urinary incontinence      No past surgical history on file. No family history on file.   Social History     Socioeconomic History    Marital status: Single     Spouse name: Not on file    Number of children: Not on file    Years of education: Not on file    Highest education level: Not on file   Occupational History    Not on file   Social Needs    Financial resource strain: Not on file    Food insecurity     Worry: Not on file     Inability: Not on file    Transportation needs     Medical: Not on file     Non-medical: Not on file   Tobacco Use    Smoking status: Never Smoker    Smokeless tobacco: Never Used   Substance and Sexual Activity    Alcohol use: Never     Frequency: Never    Drug use: Not on file    Sexual activity: Not on file   Lifestyle    Physical activity     Days per week: Not on file     Minutes per session: Not on file    Stress: Not on file   Relationships    Social connections     Talks on phone: Not on file     Gets together: Not on file     Attends Shinto service: Not on file     Active member of club or organization: Not on file     Attends meetings of clubs or organizations: Not on file     Relationship status: Not on file    Intimate partner violence     Fear of current or ex partner: Not on file     Emotionally abused: Not on file     Physically abused: Not on file     Forced sexual activity: Not on file   Other Topics Concern    Not on file   Social History Narrative    Not on file       Vitals:    01/14/21 0856   BP: 132/80   Site: Left Upper Arm   Position: Sitting   Cuff Size: Large Adult   Pulse: 115   Temp: 97.1 °F (36.2 °C)   TempSrc: Temporal   SpO2: 97%   Weight: 277 lb 12.8 oz (126 kg)   Height: 5' 1\" (1.549 m)       Physical Exam  Vitals signs and nursing note reviewed. Constitutional:       Appearance: Normal appearance. HENT:      Head: Normocephalic. Right Ear: Tympanic membrane and ear canal normal. There is no impacted cerumen. Left Ear: Tympanic membrane and ear canal normal. There is no impacted cerumen. Nose: Nose normal.      Mouth/Throat:      Mouth: Mucous membranes are dry. Eyes:      Extraocular Movements: Extraocular movements intact. Pupils: Pupils are equal, round, and reactive to light. Neck:      Musculoskeletal: No neck rigidity or muscular tenderness. Vascular: No carotid bruit. Cardiovascular:      Rate and Rhythm: Regular rhythm. Tachycardia present. Pulses: Normal pulses. Heart sounds: Normal heart sounds. No murmur. No friction rub. No gallop. Pulmonary:      Effort: Pulmonary effort is normal. No respiratory distress. Breath sounds: Normal breath sounds. No stridor. No wheezing, rhonchi or rales. Chest:      Chest wall: No tenderness.    Abdominal:      General: Bowel sounds are normal. There is no distension. Palpations: Abdomen is soft. Musculoskeletal:         General: No swelling, tenderness, deformity or signs of injury. Right lower leg: No edema. Left lower leg: No edema. Lymphadenopathy:      Cervical: No cervical adenopathy. Skin:     General: Skin is warm and dry. Capillary Refill: Capillary refill takes 2 to 3 seconds. Findings: No bruising, lesion or rash. Neurological:      General: No focal deficit present. Mental Status: He is alert and oriented to person, place, and time. Motor: Weakness present. Gait: Gait abnormal.      Comments: Uses a walker  Lower extremity weakness at times   Psychiatric:         Attention and Perception: Attention normal.         Mood and Affect: Mood normal.         Behavior: Behavior normal.         Thought Content: Thought content does not include homicidal or suicidal ideation. Thought content does not include homicidal or suicidal plan. Assessment and Plan:  April Mitchell was seen today for check-up, hypothyroidism and results. Diagnoses and all orders for this visit:    Type 2 diabetes mellitus with stage 3b chronic kidney disease, without long-term current use of insulin ([facility]  -      DIABETES FOOT EXAM  -     Comprehensive Metabolic Panel; Future  -     Hemoglobin A1C; Future  -     POCT microalbumin; Future    Facies suggestive of Down syndrome    Other specified hypothyroidism  -     TSH without Reflex; Future    Class 3 severe obesity due to excess calories with serious comorbidity and body mass index (BMI) of 50.0 to 59.9 in adult [facility])    Primary osteoarthritis involving multiple joints    Unspecified abnormalities of gait and mobility    Cardiac murmur, unspecified    Stage 3b chronic kidney disease    Hypertensive renal disease  -     Comprehensive Metabolic Panel;  Future  -     POCT Urinalysis no Micro  -     CBC Auto Differential; Future    Vitamin D deficiency    Screening for HIV without presence of risk factors  -     HIV Screen; Future    Encounter for hepatitis C screening test for low risk patient  -     HEPATITIS C ANTIBODY; Future    Familial hypercholesterolemia  -     Lipid Panel; Future        No follow-ups on file. USPTF:    (Cologuard Neg 1/21/2020 slated for Colonoscopy 02/2021) Colorectal Cancer: Screening --Adults aged 48 to 76 years  Grade: A (Recommended) recommends screening for colorectal cancer starting at age 48 years and continuing until age 76 years. (Not at risk) HIV: Screening - Adolescents and Adults  Grade: A (Recommended) recommends that clinicians screen for HIV infection in ages 13 to 72 years. (2020) High Blood Pressure: Screening and Home Monitoring -- Adults  Grade: A (Recommended) recommends screening for high blood pressure in ages 25 years or older. obtain measurements outside of the clinical setting for diagnostic confirmation before starting treatment. Annual screening for adults aged 36 years or older or those who are at increased risk for blood pressure    (2020)  Lipid Disorders in Adults: Screening -- Men 28 and Older  Grade: A (Recommended) recommends screening men aged 28 and older for lipid disorders. (Nondrinker) Alcohol Misuse: Screening and Behavioral Counseling Interventions in Primary Care -- Adults  Grade: B (Recommended) recommends that clinicians screen adults aged 25 years or older for alcohol misuse and provide persons engaged in risky or hazardous drinking with brief behavioral counseling interventions to reduce alcohol misuse. (2020) Abnormal Blood Glucose and Type 2 Diabetes Mellitus: Screening -- Adults aged 36 to 79 years who are overweight or obese Grade: B (Recommended)    (Never smoked) Abdominal Aortic Aneurysm: Screening -- Men Ages 72 to 76 Years Who Have Ever Smoked. Grade: B (Recommended) recommends one-time screening for abdominal aortic aneurysm (AAA) with ultrasonography in men ages 72 to 76 years who have ever smoked. (Discussed today)  Obesity: Screening for and Management of-- All Adults  Grade: B(Recommended) recommends screening all adults for obesity. Clinicians should offer or refer patients with a body mass index (BMI) of 30 kg/m2 or higher to intensive, multicomponent behavioral interventions. (Never smoked)  Lung Cancer: Screening -- Adults Ages 46-80 who have a 30 pack-year smoking history and currently smoke or have quit within the past 15 years  Grade: B (Recommended) recommends annual screening for lung cancer with low-dose computed tomography (LDCT) in adults aged 54 to [de-identified] years who have a 30 pack-year smoking history and currently smoke or have quit within the past 15 years. Screening should be discontinued once a person has not smoked for 15 years or develops a health problem that substantially limits life expectancy or the ability or willingness to have curative lung surgery. (Not at high risk) Hepatitis C Virus Infection: Screening--Adults at High Risk and Adults born between 1945 and 1965  Grade: B (Recommended) recommends screening for hepatitis C virus (HCV) infection in persons at high risk for infection. The USPSTF also recommends offering 1-time screening for HCV infection to adults born between 80 and 1965. (High risk)  Fall Prevention -- Exercise/Physical Therapy: Community-dwelling Adults 72 Years or Older, Increased Risk for Falls   Grade: B (Recommended) recommends exercise or physical therapy to prevent falls in community-dwelling adults aged 72 years or older who are at increased risk for falls. (Negative)  Depression: Screening -- General adult population, including pregnant and postpartum women  Grade: B(Recommended) recommends screening for depression in the general adult population,  Screening should be implemented with adequate systems in place to ensure accurate diagnosis, effective treatment, and appropriate follow-up.     (2020) Glaucoma: Screening - Adults and Diabetic Eye Exam     (2020) Vitamin D Deficiency: Screening --       (2020) Thyroid Dysfunction: Screening --      (2020) Skin Cancer: Screening --Asymptomatic adults  Grade:  I(Uncertain)     (Done) Carotid Artery Stenosis: Screening -- Adults  Grade: D (Not Recommended)     (2020) Coronary Heart Disease: Screening with Electrocardiography--Adults at Low Risk  Grade: D (Not Recommended) echocardiogram done in 2017 along with a chemical stress test    (2019) Prostate Cancer: Prostate-Specific Antigen (PSA)-Based Screening -- All Men   PSA 1.75 Dec 29 2020  PSA 1.8 December 13, 2019  PSA 2.0 November 28, 2018  PSA 1.9 September 2017  PSA 1.4 July 2017  PSA 1.2 July 2016         Seen By:  DARREN Munson - CNP

## 2021-01-14 NOTE — PATIENT INSTRUCTIONS
Patient Education        Supraventricular Tachycardia: Care Instructions  Your Care Instructions     Having supraventricular tachycardia (SVT) means that from time to time your heart beats abnormally fast. This fast rhythm is caused by changes in the electrical system of your heart. You may feel a fluttering in your chest (palpitations) and have a fast pulse. When your heart is beating fast, you may feel anxious and lightheaded, be short of breath, and feel discomfort in the chest.  Your doctor may prescribe medicines to help slow down your heartbeat. Your doctor may also suggest you try vagal maneuvers when having an episode of SVT. These are things, like bearing down, that might help slow your heart rate. Bearing down means that you try to breathe out with your stomach muscles but you don't let air out of your nose or mouth. Your doctor can show you how to do vagal maneuvers. He or she may suggest you lie down on your back to do them. In some cases, either cardioversion treatment or a procedure called catheter ablation is done to correct SVT. Your doctor may ask you to wear a small electronic device for 1 or 2 days to monitor your heart. It is called a Holter monitor. Follow-up care is a key part of your treatment and safety. Be sure to make and go to all appointments, and call your doctor if you are having problems. It's also a good idea to know your test results and keep a list of the medicines you take. How can you care for yourself at home? · Be safe with medicines. Take your medicines exactly as prescribed. Call your doctor if you think you are having a problem with your medicine. You will get more details on the specific medicines your doctor prescribes. · If your doctor showed you how to do vagal maneuvers, try them when you have an episode. These maneuvers include bearing down or putting an ice-cold, wet towel on your face. · Monitor your condition by keeping a diary of your SVT episodes. Bring this to your doctor appointments. ? Write down how fast or slow your heart was beating. To count your heart rate:  § Gently place 2 fingers of your hand on the inside of your other wrist, below your thumb. § Count the beats for 30 seconds. § Then, double the result to get the number of beats per minute. ? Write down if your heart rhythm was regular or irregular. ? Write down the symptoms you had.  ? Write down the time of day your symptoms occurred. ? Write down how long your symptoms lasted. ? Write down what you were doing when your symptoms started. ? Write down what may have helped your symptoms go away. · If they trigger episodes, limit or avoid alcohol or drinks with caffeine. · Do not use over-the-counter decongestants, herbal remedies, diet pills, or \"pep\" pills, which often contain stimulants. · Do not use illegal drugs, such as cocaine, ecstasy, or methamphetamine, which can speed up your heart's rhythm. · Do not smoke. Smoking can make this condition worse. If you need help quitting, talk to your doctor about stop-smoking programs and medicines. These can increase your chances of quitting for good. · Be alert for new or worsening symptoms, such as shortness of breath, pounding of your heart, or unusual tiredness. If new symptoms develop or your symptoms become worse, call your doctor. When should you call for help? Call 911 anytime you think you may need emergency care. For example, call if:    · You passed out (lost consciousness).     · You are short of breath. Call your doctor now or seek immediate medical care if:    · You have a fast heartbeat.     · You are dizzy or lightheaded, or feel like you may faint. Watch closely for changes in your health, and be sure to contact your doctor if:    · You do not get better as expected. Where can you learn more? Go to https://chpepiceweb.[employer]. org and sign in to your IndigoBoom account. Enter G244 in the Neuroneticshire box to learn more about \"Supraventricular Tachycardia: Care Instructions. \"     If you do not have an account, please click on the \"Sign Up Now\" link. Current as of: December 16, 2019               Content Version: 12.6  © 2329-5788 [employer]. Care instructions adapted under license by [facility] ([facility]). If you have questions about a medical condition or this instruction, always ask your healthcare professional. [place] [address] any warranty or liability for your use of this information. Patient Education        Diet for End-Stage Renal Disease (Dialysis): Care Instructions  Your Care Instructions  You need to change your diet when you are on dialysis for end-stage renal disease (kidney failure). You will need more protein than you did before you started dialysis. You may need to limit salt and fluids. You also may need to limit minerals such as potassium and phosphorus. A diet for end-stage renal disease takes planning. A dietitian who specializes in kidney disease can help you plan meals that meet your needs. Your nutrition needs depend on the type of dialysis you get. Talk with your doctor or dietitian to make sure your diet is right for your condition. Do not change your diet without talking to your doctor or dietitian. Follow-up care is a key part of your treatment and safety. Be sure to make and go to all appointments, and call your doctor if you are having problems. It's also a good idea to know your test results and keep a list of the medicines you take. How can you care for yourself at home? Each person's dialysis diet may be a little different. Work with your doctor or dietitian to create a food plan that guides your daily food choices. Here are some tips that may help you follow your plan. · Do not go for many hours without eating. If you do not feel very hungry, try to eat 4 or 5 small meals instead of 1 or 2 big meals. If you have a hard time eating enough, talk to your doctor or dietitian about ways you can add calories to your diet. · Get the right amount of protein. ? Ask your doctor or dietitian how much protein you need each day. You will probably need more protein while you are on dialysis than you did before you started dialysis. ? Choose high-quality protein sources, such as lean meat, poultry, and fish. ? If you are a vegetarian, your diet may require extra planning with your dietitian to make sure you're getting enough protein and nutrients. · Limit salt. ? Look at food labels to see how much sodium is in each serving. Make sure that you look at the serving size. If you eat more than the serving size, you will get more sodium than what is listed on the label. ? Do not add salt to your food. Try to avoid foods that list salt, sodium, or monosodium glutamate (MSG) as an ingredient. ? Buy foods that are labeled \"no salt added,\" \"sodium-free,\" or \"low-sodium. \" Foods labeled \"reduced-sodium\" and \"light sodium\" may still have too much sodium. ? Limit processed foods, fast food, and restaurant foods. And avoid salted snacks, like pretzels and chips. ? Try lemon, herbs, and spices to flavor your meals. · Know how much fluid you can drink each day. Every day fill a pitcher with that amount of water. If you drink another fluid (such as coffee) that day, pour an equal amount out of the pitcher. And remember to count foods that are liquid at room temperature, such as gelatin dessert and ice cream, as fluids. · Limit potassium. ? Choose low-potassium fruits and vegetables. These include things like grapes, pineapple, lettuce, green beans, and cucumber. ? Choose low-potassium foods such as hummus, spaghetti, macaroni, rice, tortillas, and bagels. And avoid high-potassium foods, including things like milk, bananas, oranges, spinach, tomatoes, and broccoli. ? Do not use a salt substitute or lite salt unless your doctor says it is okay. They can be high in potassium. · Limit phosphorus. ? Follow your food plan to know how much milk and milk products you can have. ? Avoid nuts, peanut butter, seeds, lentils, beans, organ meats, and sardines. ? Avoid cola drinks and bran breads or bran cereals. ? Take phosphate binders as directed, if prescribed by your doctor. Always check with your doctor if you have questions about your diet. Do not take any vitamins or minerals, supplements, or herbal products without talking to your doctor first. And check with your doctor about whether it is safe for you to drink alcohol. Where can you learn more? Go to https://DEMANDITpeRavenna Solutions.Edgeware. org and sign in to your PIERIS Proteolab account. Enter T086 in the LEAPIN Digital Keys box to learn more about \"Diet for End-Stage Renal Disease (Dialysis): Care Instructions. \"     If you do not have an account, please click on the \"Sign Up Now\" link. Current as of: August 22, 2019               Content Version: 12.6  © 3365-7029 [employer]chengwuyou, [employer]orporated. Care instructions adapted under license by [facility] ([facility]). If you have questions about a medical condition or this instruction, always ask your healthcare professional. [place] [address] any warranty or liability for your use of this information. Patient Education        Heart Murmur: Care Instructions  Your Care Instructions     A heart murmur is a blowing, whooshing, or rasping sound made by blood moving through the heart or the blood vessels near the heart. Murmurs can be heard through a stethoscope. Heart murmurs can occur during pregnancy or during a temporary illness, such as a fever. These murmurs usually are not a problem and go away on their own. However, sometimes a heart murmur is a sign of a serious problem, such as congenital heart disease or heart valve problems, that may need treatment. You may need more tests to check your heart. The treatment depends on the specific heart problem causing the murmur. Follow-up care is a key part of your treatment and safety. Be sure to make and go to all appointments, and call your doctor if you are having problems. It's also a good idea to know your test results and keep a list of the medicines you take. How can you care for yourself at home? · Take your medicines exactly as prescribed. Call your doctor if you think you are having a problem with your medicine. You will get more details on the specific medicines your doctor prescribes. · Follow a heart-healthy lifestyle to help keep your heart and body healthy. ? Do not smoke. Smoking increases your risk of heart attack and stroke. If you need help quitting, talk to your doctor about stop-smoking programs and medicines. These can increase your chances of quitting for good. ? Eat heart-healthy foods such as fruits, vegetables, whole grains, fish, lean meats, and low-fat or nonfat dairy foods. Limit sodium, sugars, and alcohol. ? If your doctor recommends it, get more exercise. Walking is a good choice. Bit by bit, increase the amount you walk every day. Try for 30 minutes on most days of the week. You also may want to swim, bike, or do other activities. ? Stay at a healthy weight. Lose weight if you need to. When should you call for help? Call 911 anytime you think you may need emergency care. For example, call if:    · You have severe trouble breathing.     · You cough up pink, foamy mucus and you have trouble breathing.     · You passed out (lost consciousness).     · You have a seizure.   · You have symptoms of a stroke. These may include:  ? Sudden numbness, tingling, weakness, or loss of movement in your face, arm, or leg, especially on only one side of your body. ? Sudden vision changes. ? Sudden trouble speaking. ? Sudden confusion or trouble understanding simple statements. ? Sudden problems with walking or balance. ? A sudden, severe headache that is different from past headaches. Call your doctor now or seek immediate medical care if:    · You have new or increased shortness of breath.     · You feel dizzy or lightheaded, or you feel like you may faint.     · You have sudden weight gain, such as more than 2 to 3 pounds in a day or 5 pounds in a week. (Your doctor may suggest a different range of weight gain.)     · You have increased swelling in your legs or feet.     · You have a fever. Watch closely for changes in your health, and be sure to contact your doctor if you have any problems. Where can you learn more? Go to https://AquaBling.[employer]. org and sign in to your opendorse account. Enter R254 in the Obsorb box to learn more about \"Heart Murmur: Care Instructions. \"     If you do not have an account, please click on the \"Sign Up Now\" link. Current as of: December 16, 2019               Content Version: 12.6  © 5929-3745 Healthwise, Incorporated. Care instructions adapted under license by [facility] ([facility]). If you have questions about a medical condition or this instruction, always ask your healthcare professional. [place] [address] any warranty or liability for your use of this information. Patient Education        Electrocardiogram (EKG): About This Test  What is it? An electrocardiogram (EKG or ECG) is a test that checks for problems with the electrical activity of your heart. An EKG translates the heart's electrical activity into line tracings on paper. Why is this test done? You may need this test to check your heart's electrical activity. The test also can check the health of your heart. For example, it can help find the cause of unexplained chest pain or pressure, or other symptoms of heart disease. How do you prepare for the test?  · Understand exactly what test is planned, along with the risks, benefits, and other options. · Tell your doctor ALL the medicines, vitamins, supplements, and herbal remedies you take. Some may increase the risk of problems during your test. Your doctor will tell you if you should stop taking any of them before the test and how soon to do it. How is the test done? · You may have to remove certain jewelry. · You will take your top off and be given a gown to wear. · You will lie on a bed or table. Parts of your arms, legs, and chest will be cleaned and may be shaved. · Small pads or patches (electrodes) will be placed, like stickers, on your skin on each arm and leg and on your chest. The electrodes are hooked to a machine that traces your heart activity onto a paper. · During the test, lie very still and breathe normally. Do not talk during the test.  What are the risks of the test?  An EKG is a completely safe test. No electricity passes through your body from the machine, and there is no danger of getting an electrical shock. How long does the test take? The test usually takes 5 to 10 minutes. What happens after the test?  · You will probably be able to go home right away. It depends on the reason for the test.  · You can go back to your usual activities right away. Follow-up care is a key part of your treatment and safety. Be sure to make and go to all appointments, and call your doctor if you are having problems. It's also a good idea to keep a list of the medicines you take. Ask your doctor when you can expect to have your test results. Where can you learn more? Go to https://chpepiceweb.healthSellaround. org and sign in to your LEAPIN Digital Keys account. Enter E180 in the Sapheonhire box to learn more about \"Electrocardiogram (EKG): About This Test.\"     If you do not have an account, please click on the \"Sign Up Now\" link. Current as of: December 16, 2019               Content Version: 12.6  © 4505-4009 [employer]s, [employer]orporated. Care instructions adapted under license by [facility] ([facility]). If you have questions about a medical condition or this instruction, always ask your healthcare professional. [place] [address] any warranty or liability for your use of this information.

## 2021-01-18 DIAGNOSIS — M15.9 PRIMARY OSTEOARTHRITIS INVOLVING MULTIPLE JOINTS: ICD-10-CM

## 2021-01-19 RX ORDER — TRAMADOL HYDROCHLORIDE 50 MG/1
50 TABLET ORAL 3 TIMES DAILY
Qty: 90 TABLET | Refills: 0 | Status: SHIPPED
Start: 2021-01-19 | End: 2021-02-15

## 2021-02-15 DIAGNOSIS — M15.9 PRIMARY OSTEOARTHRITIS INVOLVING MULTIPLE JOINTS: ICD-10-CM

## 2021-02-15 RX ORDER — TRAMADOL HYDROCHLORIDE 50 MG/1
50 TABLET ORAL 3 TIMES DAILY
Qty: 93 TABLET | Refills: 0 | Status: SHIPPED
Start: 2021-02-15 | End: 2021-03-16 | Stop reason: SDUPTHER

## 2021-03-16 DIAGNOSIS — M15.9 PRIMARY OSTEOARTHRITIS INVOLVING MULTIPLE JOINTS: ICD-10-CM

## 2021-03-16 RX ORDER — TRAMADOL HYDROCHLORIDE 50 MG/1
50 TABLET ORAL 3 TIMES DAILY
Qty: 93 TABLET | Refills: 0 | Status: SHIPPED
Start: 2021-03-16 | End: 2021-04-13 | Stop reason: SDUPTHER

## 2021-04-12 DIAGNOSIS — M15.9 PRIMARY OSTEOARTHRITIS INVOLVING MULTIPLE JOINTS: ICD-10-CM

## 2021-04-13 RX ORDER — TRAMADOL HYDROCHLORIDE 50 MG/1
50 TABLET ORAL 3 TIMES DAILY
Qty: 93 TABLET | Refills: 0 | Status: SHIPPED
Start: 2021-04-13 | End: 2021-05-14 | Stop reason: SDUPTHER

## 2021-04-28 ENCOUNTER — TELEPHONE (OUTPATIENT)
Dept: FAMILY MEDICINE CLINIC | Age: 76
End: 2021-04-28

## 2021-05-03 ENCOUNTER — NURSE ONLY (OUTPATIENT)
Dept: FAMILY MEDICINE CLINIC | Age: 76
End: 2021-05-03
Payer: MEDICARE

## 2021-05-03 DIAGNOSIS — N18.32 TYPE 2 DIABETES MELLITUS WITH STAGE 3B CHRONIC KIDNEY DISEASE, WITHOUT LONG-TERM CURRENT USE OF INSULIN (HCC): ICD-10-CM

## 2021-05-03 DIAGNOSIS — Z11.4 SCREENING FOR HIV WITHOUT PRESENCE OF RISK FACTORS: ICD-10-CM

## 2021-05-03 DIAGNOSIS — N18.32 STAGE 3B CHRONIC KIDNEY DISEASE (HCC): ICD-10-CM

## 2021-05-03 DIAGNOSIS — R01.1 CARDIAC MURMUR, UNSPECIFIED: ICD-10-CM

## 2021-05-03 DIAGNOSIS — I12.9 HYPERTENSIVE RENAL DISEASE: ICD-10-CM

## 2021-05-03 DIAGNOSIS — E11.22 TYPE 2 DIABETES MELLITUS WITH STAGE 3B CHRONIC KIDNEY DISEASE, WITHOUT LONG-TERM CURRENT USE OF INSULIN (HCC): ICD-10-CM

## 2021-05-03 DIAGNOSIS — E03.8 OTHER SPECIFIED HYPOTHYROIDISM: ICD-10-CM

## 2021-05-03 DIAGNOSIS — R26.9 UNSPECIFIED ABNORMALITIES OF GAIT AND MOBILITY: ICD-10-CM

## 2021-05-03 DIAGNOSIS — Z11.59 ENCOUNTER FOR HEPATITIS C SCREENING TEST FOR LOW RISK PATIENT: ICD-10-CM

## 2021-05-03 DIAGNOSIS — R94.31 ABNORMAL ELECTROCARDIOGRAM (ECG) (EKG): Primary | ICD-10-CM

## 2021-05-03 DIAGNOSIS — E55.9 VITAMIN D DEFICIENCY: ICD-10-CM

## 2021-05-03 DIAGNOSIS — E78.01 FAMILIAL HYPERCHOLESTEROLEMIA: ICD-10-CM

## 2021-05-03 PROCEDURE — 93000 ELECTROCARDIOGRAM COMPLETE: CPT | Performed by: NURSE PRACTITIONER

## 2021-05-03 PROCEDURE — 36415 COLL VENOUS BLD VENIPUNCTURE: CPT | Performed by: NURSE PRACTITIONER

## 2021-05-04 DIAGNOSIS — E11.22 TYPE 2 DIABETES MELLITUS WITH STAGE 3B CHRONIC KIDNEY DISEASE, WITHOUT LONG-TERM CURRENT USE OF INSULIN (HCC): ICD-10-CM

## 2021-05-04 DIAGNOSIS — N18.32 TYPE 2 DIABETES MELLITUS WITH STAGE 3B CHRONIC KIDNEY DISEASE, WITHOUT LONG-TERM CURRENT USE OF INSULIN (HCC): ICD-10-CM

## 2021-05-04 LAB
ALBUMIN SERPL-MCNC: 4 G/DL (ref 3.5–5.2)
ALP BLD-CCNC: 115 U/L (ref 40–129)
ALT SERPL-CCNC: 22 U/L (ref 0–40)
ANION GAP SERPL CALCULATED.3IONS-SCNC: 12 MMOL/L (ref 7–16)
AST SERPL-CCNC: 25 U/L (ref 0–39)
BASOPHILS ABSOLUTE: 0.05 E9/L (ref 0–0.2)
BASOPHILS RELATIVE PERCENT: 0.6 % (ref 0–2)
BILIRUB SERPL-MCNC: 0.4 MG/DL (ref 0–1.2)
BUN BLDV-MCNC: 27 MG/DL (ref 6–23)
CALCIUM SERPL-MCNC: 9.4 MG/DL (ref 8.6–10.2)
CHLORIDE BLD-SCNC: 103 MMOL/L (ref 98–107)
CHOLESTEROL, TOTAL: 133 MG/DL (ref 0–199)
CO2: 22 MMOL/L (ref 22–29)
CREAT SERPL-MCNC: 1.5 MG/DL (ref 0.7–1.2)
CREATININE URINE POCT: 50
EOSINOPHILS ABSOLUTE: 0.18 E9/L (ref 0.05–0.5)
EOSINOPHILS RELATIVE PERCENT: 2.1 % (ref 0–6)
GFR AFRICAN AMERICAN: 55
GFR NON-AFRICAN AMERICAN: 46 ML/MIN/1.73
GLUCOSE BLD-MCNC: 116 MG/DL (ref 74–99)
HBA1C MFR BLD: 6.3 % (ref 4–5.6)
HCT VFR BLD CALC: 46 % (ref 37–54)
HDLC SERPL-MCNC: 43 MG/DL
HEMOGLOBIN: 14.1 G/DL (ref 12.5–16.5)
HEPATITIS C ANTIBODY INTERPRETATION: NORMAL
HIV-1 AND HIV-2 ANTIBODIES: NORMAL
IMMATURE GRANULOCYTES #: 0.06 E9/L
IMMATURE GRANULOCYTES %: 0.7 % (ref 0–5)
LDL CHOLESTEROL CALCULATED: 72 MG/DL (ref 0–99)
LYMPHOCYTES ABSOLUTE: 2.52 E9/L (ref 1.5–4)
LYMPHOCYTES RELATIVE PERCENT: 29.3 % (ref 20–42)
MCH RBC QN AUTO: 32.4 PG (ref 26–35)
MCHC RBC AUTO-ENTMCNC: 30.7 % (ref 32–34.5)
MCV RBC AUTO: 105.7 FL (ref 80–99.9)
MICROALBUMIN/CREAT 24H UR: 30 MG/G{CREAT}
MICROALBUMIN/CREAT UR-RTO: ABNORMAL
MONOCYTES ABSOLUTE: 0.82 E9/L (ref 0.1–0.95)
MONOCYTES RELATIVE PERCENT: 9.5 % (ref 2–12)
NEUTROPHILS ABSOLUTE: 4.97 E9/L (ref 1.8–7.3)
NEUTROPHILS RELATIVE PERCENT: 57.8 % (ref 43–80)
PDW BLD-RTO: 14.6 FL (ref 11.5–15)
PLATELET # BLD: 287 E9/L (ref 130–450)
PMV BLD AUTO: 9.6 FL (ref 7–12)
POTASSIUM SERPL-SCNC: 5.1 MMOL/L (ref 3.5–5)
RBC # BLD: 4.35 E12/L (ref 3.8–5.8)
SODIUM BLD-SCNC: 137 MMOL/L (ref 132–146)
TOTAL PROTEIN: 7.2 G/DL (ref 6.4–8.3)
TRIGL SERPL-MCNC: 91 MG/DL (ref 0–149)
TSH SERPL DL<=0.05 MIU/L-ACNC: 3.31 UIU/ML (ref 0.27–4.2)
VLDLC SERPL CALC-MCNC: 18 MG/DL
WBC # BLD: 8.6 E9/L (ref 4.5–11.5)

## 2021-05-04 PROCEDURE — 82044 UR ALBUMIN SEMIQUANTITATIVE: CPT | Performed by: NURSE PRACTITIONER

## 2021-05-13 ENCOUNTER — OFFICE VISIT (OUTPATIENT)
Dept: FAMILY MEDICINE CLINIC | Age: 76
End: 2021-05-13
Payer: MEDICARE

## 2021-05-13 VITALS
TEMPERATURE: 97.3 F | WEIGHT: 264 LBS | DIASTOLIC BLOOD PRESSURE: 82 MMHG | OXYGEN SATURATION: 100 % | BODY MASS INDEX: 49.84 KG/M2 | SYSTOLIC BLOOD PRESSURE: 122 MMHG | HEIGHT: 61 IN | HEART RATE: 101 BPM

## 2021-05-13 DIAGNOSIS — E11.22 TYPE 2 DIABETES MELLITUS WITH STAGE 3B CHRONIC KIDNEY DISEASE, WITHOUT LONG-TERM CURRENT USE OF INSULIN (HCC): Primary | ICD-10-CM

## 2021-05-13 DIAGNOSIS — Z86.010 HISTORY OF COLON POLYPS: ICD-10-CM

## 2021-05-13 DIAGNOSIS — M15.9 PRIMARY OSTEOARTHRITIS INVOLVING MULTIPLE JOINTS: ICD-10-CM

## 2021-05-13 DIAGNOSIS — Z12.12 SCREENING FOR RECTAL CANCER: ICD-10-CM

## 2021-05-13 DIAGNOSIS — E66.01 CLASS 3 SEVERE OBESITY DUE TO EXCESS CALORIES WITH SERIOUS COMORBIDITY AND BODY MASS INDEX (BMI) OF 45.0 TO 49.9 IN ADULT (HCC): ICD-10-CM

## 2021-05-13 DIAGNOSIS — R94.31 ABNORMAL ELECTROCARDIOGRAM (ECG) (EKG): ICD-10-CM

## 2021-05-13 DIAGNOSIS — F79 MENTAL DEFICIENCY: ICD-10-CM

## 2021-05-13 DIAGNOSIS — E03.8 OTHER SPECIFIED HYPOTHYROIDISM: ICD-10-CM

## 2021-05-13 DIAGNOSIS — R26.9 UNSPECIFIED ABNORMALITIES OF GAIT AND MOBILITY: ICD-10-CM

## 2021-05-13 DIAGNOSIS — R01.1 CARDIAC MURMUR, UNSPECIFIED: ICD-10-CM

## 2021-05-13 DIAGNOSIS — I12.9 HYPERTENSIVE RENAL DISEASE: ICD-10-CM

## 2021-05-13 DIAGNOSIS — E55.9 VITAMIN D DEFICIENCY: ICD-10-CM

## 2021-05-13 DIAGNOSIS — Q90.9: ICD-10-CM

## 2021-05-13 DIAGNOSIS — N18.32 TYPE 2 DIABETES MELLITUS WITH STAGE 3B CHRONIC KIDNEY DISEASE, WITHOUT LONG-TERM CURRENT USE OF INSULIN (HCC): Primary | ICD-10-CM

## 2021-05-13 DIAGNOSIS — N18.32 STAGE 3B CHRONIC KIDNEY DISEASE (HCC): ICD-10-CM

## 2021-05-13 LAB
CHP ED QC CHECK: NORMAL
GLUCOSE BLD-MCNC: 167 MG/DL

## 2021-05-13 PROCEDURE — 99214 OFFICE O/P EST MOD 30 MIN: CPT | Performed by: NURSE PRACTITIONER

## 2021-05-13 PROCEDURE — 3017F COLORECTAL CA SCREEN DOC REV: CPT | Performed by: NURSE PRACTITIONER

## 2021-05-13 PROCEDURE — G8417 CALC BMI ABV UP PARAM F/U: HCPCS | Performed by: NURSE PRACTITIONER

## 2021-05-13 PROCEDURE — 1036F TOBACCO NON-USER: CPT | Performed by: NURSE PRACTITIONER

## 2021-05-13 PROCEDURE — 4040F PNEUMOC VAC/ADMIN/RCVD: CPT | Performed by: NURSE PRACTITIONER

## 2021-05-13 PROCEDURE — G8427 DOCREV CUR MEDS BY ELIG CLIN: HCPCS | Performed by: NURSE PRACTITIONER

## 2021-05-13 PROCEDURE — 82962 GLUCOSE BLOOD TEST: CPT | Performed by: NURSE PRACTITIONER

## 2021-05-13 PROCEDURE — 1123F ACP DISCUSS/DSCN MKR DOCD: CPT | Performed by: NURSE PRACTITIONER

## 2021-05-13 PROCEDURE — 3044F HG A1C LEVEL LT 7.0%: CPT | Performed by: NURSE PRACTITIONER

## 2021-05-13 PROCEDURE — 2022F DILAT RTA XM EVC RTNOPTHY: CPT | Performed by: NURSE PRACTITIONER

## 2021-05-13 ASSESSMENT — ENCOUNTER SYMPTOMS
CONSTIPATION: 0
SHORTNESS OF BREATH: 0
VOICE CHANGE: 0
ABDOMINAL PAIN: 0
VOMITING: 0
BLOOD IN STOOL: 0
WHEEZING: 0
CHEST TIGHTNESS: 0
DIARRHEA: 0
TROUBLE SWALLOWING: 0
NAUSEA: 0
COUGH: 0
BACK PAIN: 0

## 2021-05-13 NOTE — PATIENT INSTRUCTIONS
Patient Education        Colon Polyps: Care Instructions  Your Care Instructions     Colon polyps are growths in the colon or the rectum. The cause of most colon polyps is not known, and most people who get them do not have any problems. But a certain kind can turn into cancer. For this reason, regular testing for colon polyps is important for people as they get older. It is also important for anyone who has an increased risk for colon cancer. Polyps are usually found through routine colon cancer screening tests. Although most colon polyps are not cancerous, they are usually removed and then tested for cancer. Screening for colon cancer saves lives because the cancer can usually be cured if it is caught early. If you have a polyp that is the type that can turn into cancer, you may need more tests to examine your entire colon. The doctor will remove any other polyps that he or she finds, and you will be tested more often. Follow-up care is a key part of your treatment and safety. Be sure to make and go to all appointments, and call your doctor if you are having problems. It's also a good idea to know your test results and keep a list of the medicines you take. How can you care for yourself at home? Regular exams to look for colon polyps are the best way to prevent polyps from turning into colon cancer. These can include stool tests, sigmoidoscopy, colonoscopy, and CT colonography. Talk with your doctor about a testing schedule that is right for you. To prevent polyps  There is no home treatment that can prevent colon polyps. But these steps may help lower your risk for cancer. · Stay active. Being active can help you get to and stay at a healthy weight. Try to exercise on most days of the week. Walking is a good choice. · Eat well. Choose a variety of vegetables, fruits, legumes (such as peas and beans), fish, poultry, and whole grains. · Do not smoke.  If you need help quitting, talk to your doctor about ideas and can answer your questions about meal planning. This health professional can also help you reach a healthy weight if that is one of your goals. What plan is right for you? Your dietitian or diabetes educator may suggest that you start with the plate format or carbohydrate counting. The plate format  The plate format is a simple way to help you manage how you eat. You plan meals by learning how much space each food should take on a plate. Using the plate format helps you spread carbohydrate throughout the day. It can make it easier to keep your blood sugar level within your target range. It also helps you see if you're eating healthy portion sizes. To use the plate format, you put non-starchy vegetables on half your plate. Add meat or meat substitutes on one-quarter of the plate. Put a grain or starchy vegetable (such as brown rice or a potato) on the final quarter of the plate. You can add a small piece of fruit and some low-fat or fat-free milk or yogurt, depending on your carbohydrate goal for each meal.  Here are some tips for using the plate format:  · Make sure that you are not using an oversized plate. A 9-inch plate is best. Many restaurants use larger plates. · Get used to using the plate format at home. Then you can use it when you eat out. · Write down your questions about using the plate format. Talk to your doctor, a dietitian, or a diabetes educator about your concerns. Carbohydrate counting  With carbohydrate counting, you plan meals based on the amount of carbohydrate in each food. Carbohydrate raises blood sugar higher and more quickly than any other nutrient. It is found in desserts, breads and cereals, and fruit. It's also found in starchy vegetables such as potatoes and corn, grains such as rice and pasta, and milk and yogurt. Spreading carbohydrate throughout the day helps keep your blood sugar levels within your target range.   Your daily amount depends on several things, Here are some tips to get started:  · Plan your meals a week at a time. Don't forget to include snacks too. · Use cookbooks or online recipes to plan several main meals. Plan some quick meals for busy nights. You also can double some recipes that freeze well. Then you can save half for other busy nights when you don't have time to cook. · Make sure you have the ingredients you need for your recipes. If you're running low on basic items, put these items on your shopping list too. · List foods that you use to make breakfasts, lunches, and snacks. List plenty of fruits and vegetables. · Post this list on the refrigerator. Add to it as you think of more things you need. · Take the list to the store to do your weekly shopping. Follow-up care is a key part of your treatment and safety. Be sure to make and go to all appointments, and call your doctor if you are having problems. It's also a good idea to know your test results and keep a list of the medicines you take. Where can you learn more? Go to https://VastechpeTripwire.Recoup. org and sign in to your [employer] account. Enter E631 in the [facility] box to learn more about \"Learning About Meal Planning for Diabetes. \"     If you do not have an account, please click on the \"Sign Up Now\" link. Current as of: August 31, 2020               Content Version: 12.8  © 1368-5506 [employer]. Care instructions adapted under license by [facility] ([facility]). If you have questions about a medical condition or this instruction, always ask your healthcare professional. [place] [address] any warranty or liability for your use of this information. Patient Education        Diabetes and Preventing Falls: Care Instructions  Your Care Instructions     If you are an older adult who has diabetes, you may have a higher risk of falling.  Complications of diabetessuch as nerve damage, foot problems, and reduced visionmay increase your risk of a fall. Some of your medicines also may add to your risk. By making your home safer, you can lower your risk of falling. Doing things to prevent diabetes complications may also help to lower your risk. You can make your home safer with a few simple measures. Follow-up care is a key part of your treatment and safety. Be sure to make and go to all appointments, and call your doctor if you are having problems. It's also a good idea to know your test results and keep a list of the medicines you take. How can you care for yourself at home? Taking care of yourself  · Keep your blood sugar at a target level (which you set with your doctor). · Exercise regularly to improve your strength, muscle tone, and balance. Walk if you can. Swimming may be a good choice if you cannot walk easily. · Have your vision checked as often as your doctor recommends. It is usually once a year or more often if you have eye problems. · Know the side effects of the medicines you take. Ask your doctor or pharmacist whether the medicines you take can affect your balance. Sleeping pills or sedatives can affect your balance. · Limit the amount of alcohol you drink. Alcohol can impair your balance and other senses. · Have your doctor check your feet during each visit. If you have a foot problem, see your doctor. Preventing falls at home  · Remove raised doorway thresholds, throw rugs, and clutter. Repair loose carpet or raised areas in the floor. · Move furniture and electrical cords to keep them out of walking paths. · Use nonskid floor wax, and wipe up spills right away, especially on ceramic tile floors. · If you use a walker or cane, put rubber tips on it. If you use crutches, clean the bottoms of them regularly with an abrasive pad, such as steel wool. · Keep your house well lit, especially [facility], and outside walkways. Use night-lights in areas such as hallways and bathrooms.  Add extra light switches or use remote switches (such as switches that go on or off when you clap your hands) to make it easier to turn lights on if you have to get up during the night. · Install sturdy handrails on stairways. Put grab bars near your shower, bathtub, and toilet. · Store household items on low shelves so that you do not have to climb or reach high. Or use a reaching device that you can get at a medical supply store. If you have to climb for something, use a step stool with handrails, or ask someone to get it for you. · Keep a cordless phone and a flashlight with new batteries by your bed. If possible, put a phone in each of the main rooms of your house, or carry a cell phone in case you fall and cannot reach a phone. Or you can wear a device around your neck or wrist. You push a button that sends a signal for help. · Wear low-heeled shoes that fit well and give your feet good support. Use footwear with nonskid soles. Check the heels and soles of your shoes for wear. Repair or replace worn heels or soles. · Do not wear socks without shoes on wood floors. · Walk on the grass when the sidewalks are slippery. If you live in an area that gets snow and ice in the winter, sprinkle salt on slippery steps and sidewalks. Where can you learn more? Go to https://[employer]Limits Enterprises.AthleteNetwork. org and sign in to your [employer] account. Enter S891 in the [facility] box to learn more about \"Diabetes and Preventing Falls: Care Instructions. \"     If you do not have an account, please click on the \"Sign Up Now\" link. Current as of: December 7, 2020               Content Version: 12.8  © 2006-2021 [employer], [employer]. Care instructions adapted under license by [facility] ([facility]). If you have questions about a medical condition or this instruction, always ask your healthcare professional. [place] [address] any warranty or liability for your use of this information.          Patient Education        Colon Cancer Screening: Care Instructions  Your Care Instructions     Colorectal cancer occurs in the colon or rectum. That's the lower part of your digestive system. It is the second-leading cause of cancer deaths in the [place]. It often starts with small growths called polyps in the colon or rectum. Polyps are usually found with screening tests. Depending on the type of test, any polyps found may be removed during the tests. Colorectal cancer usually does not cause symptoms at first. But regular tests can help find it early, before it spreads and becomes harder to treat. Your risk for colorectal cancer gets higher as you get older. Some experts say that adults should start regular screening at age 48 and stop at age 76. Others say to start before age 48 or continue after age 76. Talk with your doctor about your risk and when to start and stop screening. You may have one of several tests. Follow-up care is a key part of your treatment and safety. Be sure to make and go to all appointments, and call your doctor if you are having problems. It's also a good idea to know your test results and keep a list of the medicines you take. What are the main screening tests for colon cancer? The screening tests are:  Stool tests. These include the guaiac fecal occult blood test (gFOBT), the fecal immunochemical test (FIT), and the combined fecal immunochemical test and stool DNA test (FIT-DNA). These tests check stool samples for signs of cancer. If your test is positive, you will need to have a colonoscopy. Sigmoidoscopy. This test lets your doctor look at the lining of your rectum and the lowest part of your colon. Your doctor uses a lighted tube called a sigmoidoscope. This test can't find cancers or polyps in the upper part of your colon. In some cases, polyps that are found can be removed. But if your doctor finds polyps, you will need to have a colonoscopy to check the upper part of your colon. Colonoscopy.    This test lets your doctor look at the lining of your rectum and your entire colon. The doctor uses a thin, flexible tool called a colonoscope. It can also be used to remove polyps or get a tissue sample (biopsy). A less common test is CT colonography (CTC). It's also called virtual colonoscopy. Who should be screened for colorectal cancer? Your risk for colorectal cancer gets higher as you get older. Some experts say that adults should start regular screening at age 48 and stop at age 76. Others say to start before age 48 or continue after age 76. Talk with your doctor about your risk and when to start and stop screening. How often you need screening depends on the type of test you get:  Stool tests. Every 1 or 2 years for FIT or gFOBT. Every 3 years for sDNA, also called FIT-DNA. Tests that look inside the colon. Every 5 or 10 years for sigmoidoscopy. Every 5 years for CT colonography (virtual colonoscopy). Every 10 years for colonoscopy. Experts agree that people at higher risk may need to be tested sooner. This includes people who have a strong family history of colon cancer. Talk to your doctor about which test is best for you and when to be tested. When should you call for help? Watch closely for changes in your health, and be sure to contact your doctor if:    · You have any changes in your bowel habits.     · You have any problems. Where can you learn more? Go to https://TephakianaCaptiveMotion.HealthyOut. org and sign in to your [employer] account. Enter [id number] in the [facility] box to learn more about \"Colon Cancer Screening: Care Instructions. \"     If you do not have an account, please click on the \"Sign Up Now\" link. Current as of: December 17, 2020               Content Version: 12.8  © 4546-9471 [employer]. Care instructions adapted under license by [facility] ([facility]).  If you have questions about a medical condition or this instruction, always ask your healthcare professional. [employer] disclaims any warranty or liability for your use of this information.

## 2021-05-13 NOTE — PROGRESS NOTES
Gerald Holt : 1945 Sex: male  Age: 77 y.o. Chief Complaint   Patient presents with    Diabetes     checked in office [phone] Hypothyroidism     check up    [address] and Plan:    Larry Caldera was seen today for diabetes, hypothyroidism and discuss labs. Diagnoses and all orders for this visit:    Type 2 diabetes mellitus with stage 3b chronic kidney disease, without long-term current use of insulin (HCC)  -     POCT Glucose  -     [facility] DIABETES FOOT EXAM    Hypertensive renal disease    Mental deficiency    Facies suggestive of Down syndrome    Cardiac murmur, unspecified    Stage 3b chronic kidney disease    Vitamin D deficiency    Primary osteoarthritis involving multiple joints    Other specified hypothyroidism    Unspecified abnormalities of gait and mobility    Abnormal electrocardiogram (ECG) (EKG)    Class 3 severe obesity due to excess calories with serious comorbidity and body mass index (BMI) of 45.0 to 49.9 in adult [facility])    Screening for rectal cancer  -     Zaida Benitez MD, General Surgery, [facility]    History of colon polyps        USPTF:    (Cologuard Neg 2020 slated for Colonoscopy 2021) Colorectal Cancer: Screening --Adults aged 48 to 76 years  Grade: A (Recommended) recommends screening for colorectal cancer starting at age 48 years and continuing until age 76 years. (Non reactive) HIV: Screening - Adolescents and Adults  Grade: A (Recommended) recommends that clinicians screen for HIV infection in ages 13 to 72 years. (B/P 122/82) High Blood Pressure: Screening and Home Monitoring -- Adults  Grade: A (Recommended) recommends screening for high blood pressure in ages 25 years or older. obtain measurements outside of the clinical setting for diagnostic confirmation before starting treatment.  Annual screening for adults aged 36 years or older or those who are at increased risk for blood pressure    ()  Lipid Disorders in Adults: Screening -- Men 28 and Older  Grade: A (Recommended) recommends screening men aged 28 and older for lipid disorders. (Nondrinker) Alcohol Misuse: Screening and Behavioral Counseling Interventions in Primary Care -- Adults  Grade: B (Recommended) recommends that clinicians screen adults aged 25 years or older for alcohol misuse and provide persons engaged in risky or hazardous drinking with brief behavioral counseling interventions to reduce alcohol misuse. (AIC 6.3) Abnormal Blood Glucose and Type 2 Diabetes Mellitus: Screening -- Adults aged 36 to 79 years who are overweight or obese Grade: B (Recommended)    (Never smoked) Abdominal Aortic Aneurysm: Screening -- Men Ages 72 to 76 Years Who Have Ever Smoked. Grade: B (Recommended) recommends one-time screening for abdominal aortic aneurysm (AAA) with ultrasonography in men ages 72 to 76 years who have ever smoked. (Discussed today)  Obesity: Screening for and Management of-- All Adults  Grade: B(Recommended) recommends screening all adults for obesity. Clinicians should offer or refer patients with a body mass index (BMI) of 30 kg/m2 or higher to intensive, multicomponent behavioral interventions. (Not at high risk) Hepatitis C Virus Infection: Screening--Adults at High Risk and Adults born between 1945 and 1965  Grade: B (Recommended) recommends screening for hepatitis C virus (HCV) infection in persons at high risk for infection. The USPSTF also recommends offering 1-time screening for HCV infection to adults born between 80 and 1965. (High risk)  Fall Prevention -- Exercise/Physical Therapy: Community-dwelling Adults 72 Years or Older, Increased Risk for Falls   Grade: B (Recommended) recommends exercise or physical therapy to prevent falls in community-dwelling adults aged 72 years or older who are at increased risk for falls.     (Negative)  Depression: Screening -- General adult population, including pregnant and postpartum women  Grade: B(Recommended) recommends screening for depression in the general adult population,  Screening should be implemented with adequate systems in place to ensure accurate diagnosis, effective treatment, and appropriate follow-up. (2020) Glaucoma: Screening - Adults and Diabetic Eye Exam     (2020) Vitamin D Deficiency: Screening --       (TSH 3.31) Thyroid Dysfunction: Screening --      (2020) Skin Cancer: Screening --Asymptomatic adults  Grade: I(Uncertain)     (Done) Carotid Artery Stenosis: Screening -- Adults  Grade: D (Not Recommended)     (2020) Coronary Heart Disease: Screening with Electrocardiography--Adults at Low Risk  Grade: D (Not Recommended) echocardiogram done in 2017 along with a chemical stress test    (2019) Prostate Cancer: Prostate-Specific Antigen (PSA)-Based Screening -- All Men   PSA 1.75 Dec 29 2020  PSA 1.8 December 13, 2019  PSA 2.0 November 28, 2018  PSA 1.9 September 2017  PSA 1.4 July 2017  PSA 1.2 July 2016      Educational materials  printed for patient's review and were included in patient instructions on his After Visit Summary and given to patient at the end of visit. Counseled regarding above diagnosis, including possible risks and complications,  especially if left uncontrolled. Counseled regarding the possible side effects, risks, benefits and alternatives to treatment; patient and/or guardian verbalizes understanding, agrees, feels comfortable with and wishes to proceed with above treatment plan. Advised patient to call with any new medication issues, and read all Rx info from pharmacy to assure aware of all possible risks and side effects of medication before taking. Reviewed age and gender appropriate health screening exams and vaccinations.   Advised patient regarding importance of keeping up with recommended health maintenance and to schedule as soon as possible if overdue, as this is important in assessing for undiagnosed pathology, especially cancer, as well as protecting against potentially harmful/life threatening disease. Patient verbalizes understanding and agrees with above counseling, assessment and plan. All questions answered. On 05/13/21 I have spent 30 reviewing previous notes, test results and face to face with the patient discussing the diagnosis and importance of compliance with the treatment plan as well as documenting on the day of the visit. Educational materials exercises printed for patient's review and were included in patient instructions on their After Visit Summary and given to patient at the end of visit.      Return in about 3 months (around 8/13/2021). CHRONIC CONDITION:    DM: Mild intensity but controlled on   OZEMPIC, 0.25 OR 0.5 MG/DOSE, 2 MG/1.5ML SOPN, , Disp: , Rfl:  remains without symptoms, no weight loss, no increase in thirst, or urination and no low blood sugar incidents. No reports of poor circulation or issues with feet ulceration or injury. Is better when compliant with diet and medications. Vision Exam Up to Date:                             Y   2021  Currently on an ACE  :                                Y  Most Recent Hgb A1c <7:                           Y   AIC 6.3   Microalbumin Test Reviewed: Y   Foot Exam Up To Date:                               Y   2021  Flu Shot Up To Date:                                   Y   2020  Has patient been seen by Neurology           N   Currently on a Statin                                    Y       HTN: Stable hypertension, controlled on    amLODIPine (NORVASC) 10 MG tablet, Take 10 mg by mouth daily, Disp: , Rfl:   enalapril (VASOTEC) 10 MG tablet, Take 10 mg by mouth daily, Disp: , Rfl:  remains at a mild intensity but overall good control, without symptoms, no ringing in the ears, no headaches and no nose bleeds. Better on medications. CAD and CHF: Patient currently not on a Betablocker but is on an ACE for LVSD.      Tobacco for abdominal pain, blood in stool, constipation, diarrhea, nausea and vomiting. Endocrine: Negative for polydipsia, polyphagia and polyuria. Genitourinary: Negative for dysuria, enuresis, frequency and hematuria. Musculoskeletal: Negative for arthralgias, back pain, gait problem, joint swelling, myalgias and neck stiffness. Skin: Negative for rash. Bruising noted along the chest wall no other falls   Neurological: Negative for dizziness, seizures, syncope, facial asymmetry, weakness, light-headedness, numbness and headaches. Hematological: Does not bruise/bleed easily. Psychiatric/Behavioral: Negative for behavioral problems, confusion, hallucinations and suicidal ideas. The patient is not nervous/anxious. Current Outpatient Medications:     traMADol (ULTRAM) 50 MG tablet, Take 1 tablet by mouth 3 times daily for 31 days. , Disp: 93 tablet, Rfl: 0    OZEMPIC, 0.25 OR 0.5 MG/DOSE, 2 MG/1.5ML SOPN, Inject 0.5 mg into the skin once a week , Disp: , Rfl:     betamethasone dipropionate (DIPROLENE) 0.05 % cream, Apply topically 2 times daily.  PRN for 14 days and then take 7 day break, Disp: 1 Tube, Rfl: 3    Azelastine HCl 137 MCG/SPRAY SOLN, 2 sprays by Nasal route daily In each nostril, Disp: , Rfl:     atorvastatin (LIPITOR) 20 MG tablet, Take 20 mg by mouth daily, Disp: , Rfl:     levothyroxine (SYNTHROID) 100 MCG tablet, Take 100 mcg by mouth Daily, Disp: , Rfl:     acetaminophen (TYLENOL) 325 MG tablet, Take 325 mg by mouth 3 times daily , Disp: , Rfl:     amLODIPine (NORVASC) 10 MG tablet, Take 10 mg by mouth daily, Disp: , Rfl:     enalapril (VASOTEC) 10 MG tablet, Take 10 mg by mouth daily, Disp: , Rfl:     allopurinol (ZYLOPRIM) 100 MG tablet, Take 100 mg by mouth daily, Disp: , Rfl:     FLUoxetine (PROZAC) 40 MG capsule, Take 40 mg by mouth daily Take with 20 mg capsuleto equal 60 mg daily, Disp: , Rfl:     fluticasone (FLONASE) 50 MCG/ACT nasal spray, 2 sprays by Each Nostril route daily, Disp: , Rfl:     oxybutynin (DITROPAN-XL) 5 MG extended release tablet, Take 5 mg by mouth nightly, Disp: , Rfl:     docusate sodium (COLACE) 100 MG capsule, Take 100 mg by mouth 1 CAPSULE EVERY MORNING AND 2 CAPSULES NIGHTLY, Disp: , Rfl:     Aspirin Buf,[id number], (BUFFERED ASPIRIN) 325 MG TABS, Take 1 tablet by mouth daily, Disp: , Rfl:     FLUoxetine (PROZAC) 20 MG capsule, Take 20 mg by mouth daily Take with 40 mg capsule to equal 60mg daily, Disp: , Rfl:     diclofenac sodium (VOLTAREN) 1 % GEL, Apply 4 g topically 4 times daily, Disp: , Rfl:     menthol-zinc oxide (CALMOSEPTINE) 0.44-20.625 % OINT ointment, Apply 1 applicator topically 2 times daily Prn. Max 30 ml per day., Disp: , Rfl:   No Known Allergies    Past Medical History:   Diagnosis Date    Ankle sprain     LEFT    Chronic kidney disease (CKD), stage III (moderate)     Constipation     Contusion of knee     Diabetes mellitus (HCC)     DJD (degenerative joint disease)     Encounter for long-term (current) use of other medications     Epidermal cyst     Facies suggestive of Down syndrome     Hearing impaired     Hyperlipidemia     Hypertension     Hypothyroid     Hypothyroid     Knee pain     Mental deficiency     Obesity     Otitis externa     Otitis media     Urge urinary incontinence      History reviewed. No pertinent surgical history. History reviewed. No pertinent family history.   Social History     Socioeconomic History    Marital status: Single     Spouse name: Not on file    Number of children: Not on file    Years of education: Not on file    Highest education level: Not on file   Occupational History    Not on file   Social Needs    Financial resource strain: Not on file    Food insecurity     Worry: Not on file     Inability: Not on file    Transportation needs     Medical: Not on file     Non-medical: Not on file   Tobacco Use    Smoking status: Never Smoker    Smokeless tobacco: Never Used   Substance and Sexual Activity    Alcohol use: Never     Frequency: Never    Drug use: Never    Sexual activity: Not on file   Lifestyle    Physical activity     Days per week: Not on file     Minutes per session: Not on file    Stress: Not on file   Relationships    Social connections     Talks on phone: Not on file     Gets together: Not on file     Attends Denominational service: Not on file     Active member of club or organization: Not on file     Attends meetings of clubs or organizations: Not on file     Relationship status: Not on file    Intimate partner violence     Fear of current or ex partner: Not on file     Emotionally abused: Not on file     Physically abused: Not on file     Forced sexual activity: Not on file   Other Topics Concern    Not on file   Social History Narrative    Not on file       Vitals:    05/13/21 1023   BP: 122/82   Site: Left Upper Arm   Position: Sitting   Cuff Size: Large Adult   Pulse: 101   Temp: 97.3 °F (36.3 °C)   TempSrc: Temporal   SpO2: 100%   Weight: 264 lb (119.7 kg)   Height: 5' 1\" (1.549 m)       Physical Exam  Vitals signs and nursing note reviewed. Constitutional:       Appearance: Normal appearance. HENT:      Head: Normocephalic. Right Ear: Tympanic membrane and ear canal normal. There is no impacted cerumen. Left Ear: Tympanic membrane and ear canal normal. There is no impacted cerumen. Nose: Nose normal.      Mouth/Throat:      Mouth: Mucous membranes are dry. Eyes:      Extraocular Movements: Extraocular movements intact. Pupils: Pupils are equal, round, and reactive to light. Neck:      Musculoskeletal: No neck rigidity or muscular tenderness. Vascular: No carotid bruit. Cardiovascular:      Rate and Rhythm: Normal rate and regular rhythm. Pulses: Normal pulses. Heart sounds: Normal heart sounds. No murmur. No friction rub. No gallop.     Pulmonary:      Effort: Pulmonary effort is normal. No respiratory distress. Breath sounds: Normal breath sounds. No stridor. No wheezing, rhonchi or rales. Chest:      Chest wall: No tenderness. Abdominal:      General: Bowel sounds are normal. There is no distension. Palpations: Abdomen is soft. Musculoskeletal:         General: No swelling, tenderness, deformity or signs of injury. Right lower leg: No edema. Left lower leg: No edema. Comments: Chest with a bruise about 14 cm X 4 cm midline no tenderness no signs of infection. Lymphadenopathy:      Cervical: No cervical adenopathy. Skin:     General: Skin is warm and dry. Capillary Refill: Capillary refill takes 2 to 3 seconds. Findings: No bruising, lesion or rash. Neurological:      General: No focal deficit present. Mental Status: He is alert and oriented to person, place, and time. Motor: No weakness. Gait: Gait normal.   Psychiatric:         Attention and Perception: Attention normal.         Mood and Affect: Mood normal.         Behavior: Behavior normal.         Thought Content: Thought content does not include homicidal or suicidal ideation. Thought content does not include homicidal or suicidal plan.          Visual inspection:  Deformity/amputation: absent  Skin lesions/pre-ulcerative calluses: absent  Edema: right- negative, left- negative    Sensory exam:  Monofilament sensation: normal  (minimum of 5 random plantar locations tested, avoiding callused areas - > 1 area with absence of sensation is + for neuropathy)    Plus at least one of the following:  Pulses: normal,   Pinprick: Intact       Seen By:  DARREN Arnold - CNP

## 2021-05-14 DIAGNOSIS — M15.9 PRIMARY OSTEOARTHRITIS INVOLVING MULTIPLE JOINTS: ICD-10-CM

## 2021-05-14 RX ORDER — TRAMADOL HYDROCHLORIDE 50 MG/1
50 TABLET ORAL 3 TIMES DAILY
Qty: 93 TABLET | Refills: 0 | Status: SHIPPED | OUTPATIENT
Start: 2021-05-14 | End: 2021-06-14

## 2021-06-10 ENCOUNTER — TELEPHONE (OUTPATIENT)
Dept: FAMILY MEDICINE CLINIC | Age: 76
End: 2021-06-10

## 2021-06-10 NOTE — TELEPHONE ENCOUNTER
Prior auth for Diclofenac Gel started thru [employer] on line. 6-11-21 PA Approved. Jp at [facility] 278 notified.

## 2021-06-16 DIAGNOSIS — M15.9 PRIMARY OSTEOARTHRITIS INVOLVING MULTIPLE JOINTS: ICD-10-CM

## 2021-06-16 RX ORDER — TRAMADOL HYDROCHLORIDE 50 MG/1
50 TABLET ORAL 3 TIMES DAILY
Qty: 93 TABLET | Refills: 0 | Status: SHIPPED
Start: 2021-06-16 | End: 2021-07-12 | Stop reason: SDUPTHER

## 2021-07-12 DIAGNOSIS — M15.9 PRIMARY OSTEOARTHRITIS INVOLVING MULTIPLE JOINTS: ICD-10-CM

## 2021-07-12 RX ORDER — TRAMADOL HYDROCHLORIDE 50 MG/1
50 TABLET ORAL 3 TIMES DAILY
Qty: 93 TABLET | Refills: 0 | Status: SHIPPED
Start: 2021-07-12 | End: 2021-08-17 | Stop reason: SDUPTHER

## 2021-07-12 NOTE — TELEPHONE ENCOUNTER
Patients last appointment 5/13/2021. Patients next scheduled appointment   Future Appointments   Date Time Provider Rhonda Sotomayor   7/16/2021 10:00 AM DO CATHERINE Pagan [facility]   9/14/2021  8:15 AM SCHEDULE, YOMAIRA STONE [facility]   9/16/2021 10:00 AM DARREN Tellez CNP [facility]

## 2021-07-16 ENCOUNTER — TELEPHONE (OUTPATIENT)
Dept: SURGERY | Age: 76
End: 2021-07-16

## 2021-07-16 ENCOUNTER — OFFICE VISIT (OUTPATIENT)
Dept: SURGERY | Age: 76
End: 2021-07-16

## 2021-07-16 VITALS
BODY MASS INDEX: 44.58 KG/M2 | SYSTOLIC BLOOD PRESSURE: 128 MMHG | RESPIRATION RATE: 18 BRPM | OXYGEN SATURATION: 98 % | WEIGHT: 277.4 LBS | TEMPERATURE: 97.1 F | HEIGHT: 66 IN | DIASTOLIC BLOOD PRESSURE: 88 MMHG | HEART RATE: 88 BPM

## 2021-07-16 DIAGNOSIS — Z86.010 HX OF COLONIC POLYPS: Primary | ICD-10-CM

## 2021-07-16 PROCEDURE — 99024 POSTOP FOLLOW-UP VISIT: CPT | Performed by: SURGERY

## 2021-07-16 RX ORDER — SODIUM CHLORIDE 9 MG/ML
INJECTION, SOLUTION INTRAVENOUS CONTINUOUS
Status: CANCELLED | OUTPATIENT
Start: 2021-07-16

## 2021-07-16 ASSESSMENT — ENCOUNTER SYMPTOMS
ABDOMINAL PAIN: 0
EYE REDNESS: 0
SHORTNESS OF BREATH: 0
BACK PAIN: 0
BLOOD IN STOOL: 0
PHOTOPHOBIA: 0
DIARRHEA: 0
SORE THROAT: 0
CONSTIPATION: 0
COUGH: 0
VOMITING: 0
WHEEZING: 0
NAUSEA: 0

## 2021-07-16 NOTE — PROGRESS NOTES
Consult Note    Dear Maria C Garg APRN - CNP, thank you for referring Annalisa Regalado for evaluation. Reason for Consult: History of colon polyp    HISTORY OF PRESENT ILLNESS:    The patient is a 77 y.o. male who presents with history of colon polyps. He denies any family history colon cancer. He denies any diarrhea, constipation, or rectal bleeding. Past Medical History:   Diagnosis Date    Ankle sprain     LEFT    Chronic kidney disease (CKD), stage III (moderate) (HCC)     Constipation     Contusion of knee     Diabetes mellitus ([facility] [address]     DJD (degenerative joint disease)     Encounter for long-term (current) use of other medications     Epidermal cyst     Facies suggestive of Down syndrome     Hearing impaired     Hyperlipidemia     Hypertension     Hypothyroid     Hypothyroid     Knee pain     Mental deficiency     Obesity     Otitis externa     Otitis media     Urge urinary incontinence        History reviewed. No pertinent surgical history. Prior to Admission medications    Medication Sig Start Date End Date Taking? Authorizing Provider   traMADol (ULTRAM) 50 MG tablet Take 1 tablet by mouth 3 times daily for 31 days. 7/12/21 8/12/21 Yes Latha Almaraz APRN - CNP   OZEMPIC, 0.25 OR 0.5 MG/DOSE, 2 MG/1.5ML SOPN Inject 0.5 mg into the skin once a week  7/16/20  Yes Historical Provider, MD   betamethasone dipropionate (DIPROLENE) 0.05 % cream Apply topically 2 times daily.  PRN for 14 days and then take 7 day break 8/5/20  Yes DARREN Tellez - CNP   Azelastine HCl 137 MCG/SPRAY SOLN 2 sprays by Nasal route daily In each nostril   Yes Historical Provider, MD   atorvastatin (LIPITOR) 20 MG tablet Take 20 mg by mouth daily   Yes Historical Provider, MD   levothyroxine (SYNTHROID) 100 MCG tablet Take 100 mcg by mouth Daily   Yes Historical Provider, MD   acetaminophen (TYLENOL) 325 MG tablet Take 325 mg by mouth 3 times daily    Yes Historical Provider, MD   amLODIPine (NORVASC) 10 MG tablet Take 10 mg by mouth daily   Yes Historical Provider, MD   enalapril (VASOTEC) 10 MG tablet Take 10 mg by mouth daily   Yes Historical Provider, MD   allopurinol (ZYLOPRIM) 100 MG tablet Take 100 mg by mouth daily   Yes Historical Provider, MD   FLUoxetine (PROZAC) 40 MG capsule Take 40 mg by mouth daily Take with 20 mg capsuleto equal 60 mg daily   Yes Historical Provider, MD   fluticasone (FLONASE) 50 MCG/ACT nasal spray 2 sprays by Each Nostril route daily   Yes Historical Provider, MD   oxybutynin (DITROPAN-XL) 5 MG extended release tablet Take 5 mg by mouth nightly   Yes Historical Provider, MD   docusate sodium (COLACE) 100 MG capsule Take 100 mg by mouth 1 CAPSULE EVERY MORNING AND 2 CAPSULES NIGHTLY   Yes Historical Provider, MD   Aspirin Buf,[id number], (BUFFERED ASPIRIN) 325 MG TABS Take 1 tablet by mouth daily   Yes Historical Provider, MD   FLUoxetine (PROZAC) 20 MG capsule Take 20 mg by mouth daily Take with 40 mg capsule to equal 60mg daily   Yes Historical Provider, MD   diclofenac sodium (VOLTAREN) 1 % GEL Apply 4 g topically 4 times daily   Yes Historical Provider, MD   menthol-zinc oxide (CALMOSEPTINE) 0.44-20.625 % OINT ointment Apply 1 applicator topically 2 times daily Prn. Max 30 ml per day.    Yes Historical Provider, MD       Scheduled Meds:  ContinuousInfusions:  PRN Meds:    No Known Allergies    Social History     Socioeconomic History    Marital status: Single     Spouse name: Not on file    Number of children: Not on file    Years of education: Not on file    Highest education level: Not on file   Occupational History    Not on file   Tobacco Use    Smoking status: Never Smoker    Smokeless tobacco: Never Used   Vaping Use    Vaping Use: Never used   Substance and Sexual Activity    Alcohol use: Never    Drug use: Never    Sexual activity: Not on file   Other Topics Concern    Not on file   Social History Narrative    Not on file     Social Determinants of Health     Financial Resource Strain:     Difficulty of Paying Living Expenses:    Food Insecurity:     Worried About Running Out of Food in the Last Year:     [address] in the Last Year:    Transportation Needs:     Lack of Transportation (Medical):  Lack of Transportation (Non-Medical):    Physical Activity:     Days of Exercise per Week:     Minutes of Exercise per Session:    Stress:     Feeling of Stress :    Social Connections:     Frequency of Communication with Friends and Family:     Frequency of Social Gatherings with Friends and Family:     Attends Jainism Services:     Active Member of Clubs or Organizations:     Attends Club or Organization Meetings:     Marital Status:    Intimate Partner Violence:     Fear of Current or Ex-Partner:     Emotionally Abused:     Physically Abused:     Sexually Abused:        No family history on file. Review Of Systems:   Review of Systems   Constitutional: Negative for chills and fever. HENT: Negative for ear pain, nosebleeds, sore throat and tinnitus. Eyes: Negative for photophobia and redness. Respiratory: Negative for cough, shortness of breath and wheezing. Cardiovascular: Negative for chest pain and palpitations. Gastrointestinal: Negative for abdominal pain, blood in stool, constipation, diarrhea, nausea and vomiting. Endocrine: Negative for polydipsia. Genitourinary: Negative for dysuria, hematuria and urgency. Musculoskeletal: Negative for back pain and neck pain. Skin: Negative for rash. Neurological: Negative for dizziness, tremors and seizures. Hematological: Does not bruise/bleed easily. All other systems reviewed and are negative.        Physical Exam:  Vitals:    07/16/21 1003   BP: 128/88   Site: Right Upper Arm   Pulse: 88   Resp: 18   Temp: 97.1 °F (36.2 °C)   TempSrc: Temporal   SpO2: 98%   Weight: 277 lb 6.4 oz (125.8 kg)   Height: 5' 6\" (1.676 m)       General: Well nourished, well developed, no acute distress  Eyes:  PERRL   Conjunctiva unremarkable   ENT:  TM's intact bilaterally, no effusion   Nasal:  No mucosal edema     No nasal drainage   Oral:  mucosa moist and pink   Neck:  Supple   No palpable cervical lymphoadenopathy   Thyroid without mass or enlargement  Resp: Lungs CTAB   Equal and adequate air exchange without accessory muscle use   No rales, rhonchi or wheeze  CV: S1S2 RRR   No murmur   Intact distal pulses   No edema  GI: Abdomen Soft, non tender, non distended   Normoactive bowel sounds   No palpable hepatosplenomegaly  MS: Physiologic ROM of all extremities    Intact distal pulses   No clubbing or cyanosis   Skin Warm and dry; no rash or lesion  Neuro: Alert and oriented; normal and intact dtr's   Psych: Euthymic mood, congruent affect      No results found. Assessment/Plan:  3 80-year-old male with history of colon polyps. We will plan for colonoscopy. The risks and benefits of the procedure were explained to the patient, including risks of bleeding and perforation. The patient expressed understanding of these risks.         Electronically signed by Bjorn Lopez DO on 7/16/21 at 10:31 AM EDT

## 2021-07-16 NOTE — TELEPHONE ENCOUNTER
Prior Authorization Form:      DEMOGRAPHICS:                     Patient Name:  Victoria Zhu  Patient :  1945            Insurance:  Payor: MEDICARE / Plan: MEDICARE PART A AND B / Product Type: *No Product type* /   Insurance ID Number:    Payor/Plan Subscr  Sex Relation Sub. Ins. ID Effective Group Num   1. Ravi 1945 Male Self [id number]                                    [address] [address]   2.  [place] -* [facility] 1945 Male Self [id number]                                    [address]         DIAGNOSIS & PROCEDURE:                       Procedure/Operation: colonoscopy          CPT Code: 67032    Diagnosis:  Hx of polyps    ICD10 Code: z86.010    Location:  [facility]    Surgeon:  Raudel Santamaria INFORMATION:                          Date:     Time: 830              Anesthesia:  MAC/TIVA                                                       Status:  Outpatient        Special Comments:         Electronically signed by Oral President, MA on 2021 at 4:32 PM

## 2021-07-16 NOTE — H&P (VIEW-ONLY)
HISTORY OF PRESENT ILLNESS:    The patient is a 77 y.o. male who presents with history of colon polyps. He denies any family history colon cancer. He denies any diarrhea, constipation, or rectal bleeding. Past Medical History:   Diagnosis Date    Ankle sprain     LEFT    Chronic kidney disease (CKD), stage III (moderate) (HCC)     Constipation     Contusion of knee     Diabetes mellitus ([facility] [address]     DJD (degenerative joint disease)     Encounter for long-term (current) use of other medications     Epidermal cyst     Facies suggestive of Down syndrome     Hearing impaired     Hyperlipidemia     Hypertension     Hypothyroid     Hypothyroid     Knee pain     Mental deficiency     Obesity     Otitis externa     Otitis media     Urge urinary incontinence        History reviewed. No pertinent surgical history. Prior to Admission medications    Medication Sig Start Date End Date Taking? Authorizing Provider   traMADol (ULTRAM) 50 MG tablet Take 1 tablet by mouth 3 times daily for 31 days. 7/12/21 8/12/21 Yes DARREN Boles CNP   OZEMPIC, 0.25 OR 0.5 MG/DOSE, 2 MG/1.5ML SOPN Inject 0.5 mg into the skin once a week  7/16/20  Yes Historical Provider, MD   betamethasone dipropionate (DIPROLENE) 0.05 % cream Apply topically 2 times daily.  PRN for 14 days and then take 7 day break 8/5/20  Yes DARREN Dewitt CNP   Azelastine HCl 137 MCG/SPRAY SOLN 2 sprays by Nasal route daily In each nostril   Yes Historical Provider, MD   atorvastatin (LIPITOR) 20 MG tablet Take 20 mg by mouth daily   Yes Historical Provider, MD   levothyroxine (SYNTHROID) 100 MCG tablet Take 100 mcg by mouth Daily   Yes Historical Provider, MD   acetaminophen (TYLENOL) 325 MG tablet Take 325 mg by mouth 3 times daily    Yes Historical Provider, MD   amLODIPine (NORVASC) 10 MG tablet Take 10 mg by mouth daily   Yes Historical Provider, MD   enalapril (VASOTEC) 10 MG tablet Take 10 mg by mouth daily   Yes Historical Provider, MD   allopurinol (ZYLOPRIM) 100 MG tablet Take 100 mg by mouth daily   Yes Historical Provider, MD   FLUoxetine (PROZAC) 40 MG capsule Take 40 mg by mouth daily Take with 20 mg capsuleto equal 60 mg daily   Yes Historical Provider, MD   fluticasone (FLONASE) 50 MCG/ACT nasal spray 2 sprays by Each Nostril route daily   Yes Historical Provider, MD   oxybutynin (DITROPAN-XL) 5 MG extended release tablet Take 5 mg by mouth nightly   Yes Historical Provider, MD   docusate sodium (COLACE) 100 MG capsule Take 100 mg by mouth 1 CAPSULE EVERY MORNING AND 2 CAPSULES NIGHTLY   Yes Historical Provider, MD   Aspirin Buf,[id number], (BUFFERED ASPIRIN) 325 MG TABS Take 1 tablet by mouth daily   Yes Historical Provider, MD   FLUoxetine (PROZAC) 20 MG capsule Take 20 mg by mouth daily Take with 40 mg capsule to equal 60mg daily   Yes Historical Provider, MD   diclofenac sodium (VOLTAREN) 1 % GEL Apply 4 g topically 4 times daily   Yes Historical Provider, MD   menthol-zinc oxide (CALMOSEPTINE) 0.44-20.625 % OINT ointment Apply 1 applicator topically 2 times daily Prn. Max 30 ml per day.    Yes Historical Provider, MD       Scheduled Meds:  ContinuousInfusions:  PRN Meds:    No Known Allergies    Social History     Socioeconomic History    Marital status: Single     Spouse name: Not on file    Number of children: Not on file    Years of education: Not on file    Highest education level: Not on file   Occupational History    Not on file   Tobacco Use    Smoking status: Never Smoker    Smokeless tobacco: Never Used   Vaping Use    Vaping Use: Never used   Substance and Sexual Activity    Alcohol use: Never    Drug use: Never    Sexual activity: Not on file   Other Topics Concern    Not on file   Social History Narrative    Not on file     Social Determinants of Health     Financial Resource Strain:     Difficulty of Paying Living Expenses:    Food Insecurity:     Worried About Running Out of Food in the Last Year:     Ran Out of Food in the Last Year:    Transportation Needs:     Lack of Transportation (Medical):  Lack of Transportation (Non-Medical):    Physical Activity:     Days of Exercise per Week:     Minutes of Exercise per Session:    Stress:     Feeling of Stress :    Social Connections:     Frequency of Communication with Friends and Family:     Frequency of Social Gatherings with Friends and Family:     Attends Advent Services:     Active Member of Clubs or Organizations:     Attends Club or Organization Meetings:     Marital Status:    Intimate Partner Violence:     Fear of Current or Ex-Partner:     Emotionally Abused:     Physically Abused:     Sexually Abused:        No family history on file. Review Of Systems:   Review of Systems   Constitutional: Negative for chills and fever. HENT: Negative for ear pain, nosebleeds, sore throat and tinnitus. Eyes: Negative for photophobia and redness. Respiratory: Negative for cough, shortness of breath and wheezing. Cardiovascular: Negative for chest pain and palpitations. Gastrointestinal: Negative for abdominal pain, blood in stool, constipation, diarrhea, nausea and vomiting. Endocrine: Negative for polydipsia. Genitourinary: Negative for dysuria, hematuria and urgency. Musculoskeletal: Negative for back pain and neck pain. Skin: Negative for rash. Neurological: Negative for dizziness, tremors and seizures. Hematological: Does not bruise/bleed easily. All other systems reviewed and are negative.        Physical Exam:  Vitals:    07/16/21 1003   BP: 128/88   Site: Right Upper Arm   Pulse: 88   Resp: 18   Temp: 97.1 °F (36.2 °C)   TempSrc: Temporal   SpO2: 98%   Weight: 277 lb 6.4 oz (125.8 kg)   Height: 5' 6\" (1.676 m)       General: Well nourished, well developed, no acute distress  Eyes:  PERRL   Conjunctiva unremarkable   ENT:  TM's intact bilaterally, no effusion   Nasal:  No mucosal edema     No nasal drainage   Oral:  mucosa moist and pink   Neck:  Supple   No palpable cervical lymphoadenopathy   Thyroid without mass or enlargement  Resp: Lungs CTAB   Equal and adequate air exchange without accessory muscle use   No rales, rhonchi or wheeze  CV: S1S2 RRR   No murmur   Intact distal pulses   No edema  GI: Abdomen Soft, non tender, non distended   Normoactive bowel sounds   No palpable hepatosplenomegaly  MS: Physiologic ROM of all extremities    Intact distal pulses   No clubbing or cyanosis   Skin Warm and dry; no rash or lesion  Neuro: Alert and oriented; normal and intact dtr's   Psych: Euthymic mood, congruent affect      No results found. Assessment/Plan:  3 43-year-old male with history of colon polyps. We will plan for colonoscopy. The risks and benefits of the procedure were explained to the patient, including risks of bleeding and perforation. The patient expressed understanding of these risks.

## 2021-07-16 NOTE — H&P
HISTORY OF PRESENT ILLNESS:    The patient is a 77 y.o. male who presents with history of colon polyps. He denies any family history colon cancer. He denies any diarrhea, constipation, or rectal bleeding. Past Medical History:   Diagnosis Date    Ankle sprain     LEFT    Chronic kidney disease (CKD), stage III (moderate) (HCC)     Constipation     Contusion of knee     Diabetes mellitus ([facility] [address]     DJD (degenerative joint disease)     Encounter for long-term (current) use of other medications     Epidermal cyst     Facies suggestive of Down syndrome     Hearing impaired     Hyperlipidemia     Hypertension     Hypothyroid     Hypothyroid     Knee pain     Mental deficiency     Obesity     Otitis externa     Otitis media     Urge urinary incontinence        History reviewed. No pertinent surgical history. Prior to Admission medications    Medication Sig Start Date End Date Taking? Authorizing Provider   traMADol (ULTRAM) 50 MG tablet Take 1 tablet by mouth 3 times daily for 31 days. 7/12/21 8/12/21 Yes DARREN Boles - CNP   OZEMPIC, 0.25 OR 0.5 MG/DOSE, 2 MG/1.5ML SOPN Inject 0.5 mg into the skin once a week  7/16/20  Yes Historical Provider, MD   betamethasone dipropionate (DIPROLENE) 0.05 % cream Apply topically 2 times daily.  PRN for 14 days and then take 7 day break 8/5/20  Yes DARREN Guo CNP   Azelastine HCl 137 MCG/SPRAY SOLN 2 sprays by Nasal route daily In each nostril   Yes Historical Provider, MD   atorvastatin (LIPITOR) 20 MG tablet Take 20 mg by mouth daily   Yes Historical Provider, MD   levothyroxine (SYNTHROID) 100 MCG tablet Take 100 mcg by mouth Daily   Yes Historical Provider, MD   acetaminophen (TYLENOL) 325 MG tablet Take 325 mg by mouth 3 times daily    Yes Historical Provider, MD   amLODIPine (NORVASC) 10 MG tablet Take 10 mg by mouth daily   Yes Historical Provider, MD   enalapril (VASOTEC) 10 MG tablet Take 10 mg by mouth daily   Yes Historical Last Year:     Ran Out of Food in the Last Year:    Transportation Needs:     Lack of Transportation (Medical):  Lack of Transportation (Non-Medical):    Physical Activity:     Days of Exercise per Week:     Minutes of Exercise per Session:    Stress:     Feeling of Stress :    Social Connections:     Frequency of Communication with Friends and Family:     Frequency of Social Gatherings with Friends and Family:     Attends Christianity Services:     Active Member of Clubs or Organizations:     Attends Club or Organization Meetings:     Marital Status:    Intimate Partner Violence:     Fear of Current or Ex-Partner:     Emotionally Abused:     Physically Abused:     Sexually Abused:        No family history on file. Review Of Systems:   Review of Systems   Constitutional: Negative for chills and fever. HENT: Negative for ear pain, nosebleeds, sore throat and tinnitus. Eyes: Negative for photophobia and redness. Respiratory: Negative for cough, shortness of breath and wheezing. Cardiovascular: Negative for chest pain and palpitations. Gastrointestinal: Negative for abdominal pain, blood in stool, constipation, diarrhea, nausea and vomiting. Endocrine: Negative for polydipsia. Genitourinary: Negative for dysuria, hematuria and urgency. Musculoskeletal: Negative for back pain and neck pain. Skin: Negative for rash. Neurological: Negative for dizziness, tremors and seizures. Hematological: Does not bruise/bleed easily. All other systems reviewed and are negative.        Physical Exam:  Vitals:    07/16/21 1003   BP: 128/88   Site: Right Upper Arm   Pulse: 88   Resp: 18   Temp: 97.1 °F (36.2 °C)   TempSrc: Temporal   SpO2: 98%   Weight: 277 lb 6.4 oz (125.8 kg)   Height: 5' 6\" (1.676 m)       General: Well nourished, well developed, no acute distress  Eyes:  PERRL   Conjunctiva unremarkable   ENT:  TM's intact bilaterally, no effusion   Nasal:  No mucosal edema     No nasal

## 2021-07-22 ENCOUNTER — OFFICE VISIT (OUTPATIENT)
Dept: FAMILY MEDICINE CLINIC | Age: 76
End: 2021-07-22
Payer: MEDICARE

## 2021-07-22 VITALS
HEART RATE: 75 BPM | SYSTOLIC BLOOD PRESSURE: 122 MMHG | OXYGEN SATURATION: 100 % | BODY MASS INDEX: 41.62 KG/M2 | WEIGHT: 259 LBS | HEIGHT: 66 IN | DIASTOLIC BLOOD PRESSURE: 80 MMHG | TEMPERATURE: 97.2 F

## 2021-07-22 DIAGNOSIS — E55.9 VITAMIN D DEFICIENCY: ICD-10-CM

## 2021-07-22 DIAGNOSIS — N18.32 TYPE 2 DIABETES MELLITUS WITH STAGE 3B CHRONIC KIDNEY DISEASE, WITHOUT LONG-TERM CURRENT USE OF INSULIN (HCC): ICD-10-CM

## 2021-07-22 DIAGNOSIS — M15.9 PRIMARY OSTEOARTHRITIS INVOLVING MULTIPLE JOINTS: ICD-10-CM

## 2021-07-22 DIAGNOSIS — R01.1 CARDIAC MURMUR, UNSPECIFIED: ICD-10-CM

## 2021-07-22 DIAGNOSIS — F79 MENTAL DEFICIENCY: ICD-10-CM

## 2021-07-22 DIAGNOSIS — E11.22 TYPE 2 DIABETES MELLITUS WITH STAGE 3B CHRONIC KIDNEY DISEASE, WITHOUT LONG-TERM CURRENT USE OF INSULIN (HCC): ICD-10-CM

## 2021-07-22 DIAGNOSIS — I12.9 HYPERTENSIVE RENAL DISEASE: ICD-10-CM

## 2021-07-22 DIAGNOSIS — M25.531 RIGHT WRIST PAIN: Primary | ICD-10-CM

## 2021-07-22 DIAGNOSIS — Q90.9: ICD-10-CM

## 2021-07-22 DIAGNOSIS — E66.01 CLASS 3 SEVERE OBESITY DUE TO EXCESS CALORIES WITH SERIOUS COMORBIDITY AND BODY MASS INDEX (BMI) OF 45.0 TO 49.9 IN ADULT (HCC): ICD-10-CM

## 2021-07-22 DIAGNOSIS — N18.32 STAGE 3B CHRONIC KIDNEY DISEASE (HCC): ICD-10-CM

## 2021-07-22 DIAGNOSIS — E03.8 OTHER SPECIFIED HYPOTHYROIDISM: ICD-10-CM

## 2021-07-22 PROCEDURE — G8427 DOCREV CUR MEDS BY ELIG CLIN: HCPCS | Performed by: NURSE PRACTITIONER

## 2021-07-22 PROCEDURE — G8417 CALC BMI ABV UP PARAM F/U: HCPCS | Performed by: NURSE PRACTITIONER

## 2021-07-22 PROCEDURE — 99213 OFFICE O/P EST LOW 20 MIN: CPT | Performed by: NURSE PRACTITIONER

## 2021-07-22 PROCEDURE — 3044F HG A1C LEVEL LT 7.0%: CPT | Performed by: NURSE PRACTITIONER

## 2021-07-22 PROCEDURE — 1036F TOBACCO NON-USER: CPT | Performed by: NURSE PRACTITIONER

## 2021-07-22 PROCEDURE — 2022F DILAT RTA XM EVC RTNOPTHY: CPT | Performed by: NURSE PRACTITIONER

## 2021-07-22 PROCEDURE — 4040F PNEUMOC VAC/ADMIN/RCVD: CPT | Performed by: NURSE PRACTITIONER

## 2021-07-22 PROCEDURE — 3017F COLORECTAL CA SCREEN DOC REV: CPT | Performed by: NURSE PRACTITIONER

## 2021-07-22 PROCEDURE — 1123F ACP DISCUSS/DSCN MKR DOCD: CPT | Performed by: NURSE PRACTITIONER

## 2021-07-22 ASSESSMENT — ENCOUNTER SYMPTOMS
EYE DISCHARGE: 0
EYE ITCHING: 0
SORE THROAT: 0
ABDOMINAL DISTENTION: 0
EYE PAIN: 0
DIARRHEA: 0
TROUBLE SWALLOWING: 0
FACIAL SWELLING: 0
NAUSEA: 0
APNEA: 0
WHEEZING: 0
SINUS PRESSURE: 0
PHOTOPHOBIA: 0
BLOOD IN STOOL: 0
VOICE CHANGE: 0
SHORTNESS OF BREATH: 0
STRIDOR: 0
COLOR CHANGE: 1
EYE REDNESS: 0
BACK PAIN: 0
RECTAL PAIN: 0
CHOKING: 0
COUGH: 0
RHINORRHEA: 0
CONSTIPATION: 0
ANAL BLEEDING: 0
ABDOMINAL PAIN: 0
SINUS PAIN: 0
CHEST TIGHTNESS: 0
VOMITING: 0

## 2021-07-22 NOTE — PROGRESS NOTES
21  Savannah Bis : 1945 Sex: male  Age: 77 y.o. Chief Complaint   Patient presents with    Wrist Pain     Right wrist pain, bruised, swollen, noticed it this morning, he states he bumped it        Patient complains of right wrist pain. He noticed it hurt this morning and it was bruised and swollen. He thinks he bumped it. He noticed a bump on the top of it. Review of Systems   Constitutional: Negative for activity change, appetite change, chills, diaphoresis, fatigue, fever and unexpected weight change. HENT: Negative for congestion, dental problem, drooling, ear discharge, ear pain, facial swelling, hearing loss, mouth sores, nosebleeds, postnasal drip, rhinorrhea, sinus pressure, sinus pain, sneezing, sore throat, tinnitus, trouble swallowing and voice change. Eyes: Negative for photophobia, pain, discharge, redness, itching and visual disturbance. Respiratory: Negative for apnea, cough, choking, chest tightness, shortness of breath, wheezing and stridor. Cardiovascular: Negative for chest pain, palpitations and leg swelling. Gastrointestinal: Negative for abdominal distention, abdominal pain, anal bleeding, blood in stool, constipation, diarrhea, nausea, rectal pain and vomiting. Endocrine: Negative for cold intolerance, heat intolerance, polydipsia, polyphagia and polyuria. Genitourinary: Negative for decreased urine volume, difficulty urinating, dysuria, enuresis, flank pain, frequency, genital sores, hematuria and urgency. Musculoskeletal: Positive for arthralgias and gait problem. Negative for back pain, joint swelling, myalgias, neck pain and neck stiffness. Skin: Positive for color change (bruising to right wrist). Negative for pallor, rash and wound. Allergic/Immunologic: Negative for environmental allergies, food allergies and immunocompromised state.    Neurological: Negative for dizziness, tremors, seizures, syncope, facial asymmetry, speech difficulty, weakness, light-headedness, numbness and headaches. Hematological: Negative for adenopathy. Does not bruise/bleed easily. Psychiatric/Behavioral: Negative for agitation, behavioral problems, confusion, decreased concentration, hallucinations, self-injury, sleep disturbance and suicidal ideas. The patient is not nervous/anxious and is not hyperactive. Current Outpatient Medications:     traMADol (ULTRAM) 50 MG tablet, Take 1 tablet by mouth 3 times daily for 31 days. , Disp: 93 tablet, Rfl: 0    OZEMPIC, 0.25 OR 0.5 MG/DOSE, 2 MG/1.5ML SOPN, Inject 0.5 mg into the skin once a week , Disp: , Rfl:     betamethasone dipropionate (DIPROLENE) 0.05 % cream, Apply topically 2 times daily.  PRN for 14 days and then take 7 day break, Disp: 1 Tube, Rfl: 3    Azelastine HCl 137 MCG/SPRAY SOLN, 2 sprays by Nasal route daily In each nostril, Disp: , Rfl:     atorvastatin (LIPITOR) 20 MG tablet, Take 20 mg by mouth daily, Disp: , Rfl:     levothyroxine (SYNTHROID) 100 MCG tablet, Take 100 mcg by mouth Daily, Disp: , Rfl:     acetaminophen (TYLENOL) 325 MG tablet, Take 325 mg by mouth 3 times daily , Disp: , Rfl:     amLODIPine (NORVASC) 10 MG tablet, Take 10 mg by mouth daily, Disp: , Rfl:     enalapril (VASOTEC) 10 MG tablet, Take 10 mg by mouth daily, Disp: , Rfl:     allopurinol (ZYLOPRIM) 100 MG tablet, Take 100 mg by mouth daily, Disp: , Rfl:     FLUoxetine (PROZAC) 40 MG capsule, Take 40 mg by mouth daily Take with 20 mg capsuleto equal 60 mg daily, Disp: , Rfl:     fluticasone (FLONASE) 50 MCG/ACT nasal spray, 2 sprays by Each Nostril route daily, Disp: , Rfl:     oxybutynin (DITROPAN-XL) 5 MG extended release tablet, Take 5 mg by mouth nightly, Disp: , Rfl:     docusate sodium (COLACE) 100 MG capsule, Take 100 mg by mouth 1 CAPSULE EVERY MORNING AND 2 CAPSULES NIGHTLY, Disp: , Rfl:     Aspirin Buf,[id number]-AoKyt-DvEzn, (BUFFERED ASPIRIN) 325 MG TABS, Take 1 tablet by mouth daily, Disp: , Rfl:    FLUoxetine (PROZAC) 20 MG capsule, Take 20 mg by mouth daily Take with 40 mg capsule to equal 60mg daily, Disp: , Rfl:     diclofenac sodium (VOLTAREN) 1 % GEL, Apply 4 g topically 4 times daily, Disp: , Rfl:     menthol-zinc oxide (CALMOSEPTINE) 0.44-20.625 % OINT ointment, Apply 1 applicator topically 2 times daily Prn. Max 30 ml per day., Disp: , Rfl:   No Known Allergies    Past Medical History:   Diagnosis Date    Ankle sprain     LEFT    [facility] disease [facility]CKD[facility] III (moderate) ([facility])     Constipation     Contusion of knee     Diabetes mellitus (HCC)     DJD (degenerative joint disease)     Encounter for long-term (current) use of other medications     Epidermal cyst     Facies suggestive of Down syndrome     Hearing impaired     Hyperlipidemia     Hypertension     Hypothyroid     Hypothyroid     Knee pain     Mental deficiency     Obesity     Otitis externa     Otitis media     Urge urinary incontinence      History reviewed. No pertinent surgical history. History reviewed. No pertinent family history. Social History     Socioeconomic History    Marital status: Single     Spouse name: Not on file    Number of children: Not on file    Years of education: Not on file    Highest education level: Not on file   Occupational History    Not on file   Tobacco Use    Smoking status: Never Smoker    Smokeless tobacco: Never Used   Vaping Use    Vaping Use: Never used   Substance and Sexual Activity    Alcohol use: Never    Drug use: Never    Sexual activity: Not on file   Other Topics Concern    Not on file   Social History Narrative    Not on file     Social Determinants of Health     Financial Resource Strain:     Difficulty of Paying Living Expenses:    Food Insecurity:     Worried About Running Out of Food in the Last Year:     [address] in the Last Year:    Transportation Needs:     Lack of Transportation (Medical):      Lack of Transportation (Non-Medical): Physical Activity:     Days of Exercise per Week:     Minutes of Exercise per Session:    Stress:     Feeling of Stress :    Social Connections:     Frequency of Communication with Friends and Family:     Frequency of Social Gatherings with Friends and Family:     Attends Hindu Services:     Active Member of Clubs or Organizations:     Attends Club or Organization Meetings:     Marital Status:    Intimate Partner Violence:     Fear of Current or Ex-Partner:     Emotionally Abused:     Physically Abused:     Sexually Abused:      Patient Active Problem List   Diagnosis    Facies suggestive of Down syndrome    Hypothyroid    Obesity    DJD (degenerative joint disease)    Mental deficiency    Abnormal electrocardiogram (ECG) (EKG)    Unspecified abnormalities of gait and mobility    Cardiac murmur, unspecified    Stage 3 chronic kidney disease ([facility] [address]    Hypertensive renal disease    Vitamin D deficiency    Type 2 diabetes mellitus with stage 3b chronic kidney disease, without long-term current use of insulin ([facility])        Vitals:    07/22/21 1459   BP: 122/80   Site: Left Upper Arm   Position: Sitting   Cuff Size: Medium Adult   Pulse: 75   Temp: 97.2 °F (36.2 °C)   TempSrc: Temporal   SpO2: 100%   Weight: 259 lb (117.5 kg)   Height: 5' 6\" (1.676 m)       Physical Exam  Vitals and nursing note reviewed. Constitutional:       General: He is not in acute distress. Appearance: Normal appearance. He is obese. He is not ill-appearing, toxic-appearing or diaphoretic. HENT:      Head: Normocephalic and atraumatic. Right Ear: Tympanic membrane, ear canal and external ear normal. There is no impacted cerumen. Left Ear: Tympanic membrane, ear canal and external ear normal. There is no impacted cerumen. Nose: Nose normal. No congestion or rhinorrhea. Mouth/Throat:      Mouth: Mucous membranes are moist.      Pharynx: Oropharynx is clear.  No oropharyngeal exudate or posterior oropharyngeal erythema. Eyes:      General: No scleral icterus. Right eye: No discharge. Left eye: No discharge. Extraocular Movements: Extraocular movements intact. Conjunctiva/sclera: Conjunctivae normal.      Pupils: Pupils are equal, round, and reactive to light. Neck:      Vascular: No carotid bruit. Cardiovascular:      Rate and Rhythm: Normal rate and regular rhythm. Pulses: Normal pulses. Heart sounds: Murmur heard. No friction rub. No gallop. Pulmonary:      Effort: Pulmonary effort is normal. No respiratory distress. Breath sounds: No stridor. No wheezing, rhonchi or rales. Chest:      Chest wall: No tenderness. Abdominal:      General: Abdomen is flat. Bowel sounds are normal. There is no distension. Palpations: Abdomen is soft. There is no mass. Tenderness: There is no abdominal tenderness. There is no right CVA tenderness, left CVA tenderness, guarding or rebound. Hernia: No hernia is present. Musculoskeletal:         General: Swelling (right wrist), tenderness, deformity (right wrist raised bump on top of wrist) and signs of injury present. Normal range of motion. Cervical back: Normal range of motion and neck supple. No rigidity. No muscular tenderness. Right lower leg: No edema. Left lower leg: No edema. Lymphadenopathy:      Cervical: No cervical adenopathy. Skin:     General: Skin is warm and dry. Capillary Refill: Capillary refill takes less than 2 seconds. Coloration: Skin is not jaundiced or pale. Findings: Bruising (wrist) present. No erythema, lesion or rash. Neurological:      General: No focal deficit present. Mental Status: He is alert and oriented to person, place, and time. Mental status is at baseline. Cranial Nerves: No cranial nerve deficit. Sensory: No sensory deficit. Motor: No weakness.       Coordination: Coordination normal.      Gait: Gait normal.      Deep Tendon Reflexes: Reflexes normal.   Psychiatric:         Mood and Affect: Mood normal.         Behavior: Behavior normal.         Thought Content: Thought content normal.         Judgment: Judgment normal.      Comments: Mental disability at baseline         Assessment and Plan:  Rosina Toribio was seen today for wrist pain. Diagnoses and all orders for this visit:    Right wrist pain  -     XR WRIST RIGHT (MIN 3 VIEWS); Future    Cardiac murmur, unspecified    Other specified hypothyroidism    Type 2 diabetes mellitus with stage 3b chronic kidney disease, without long-term current use of insulin (HCC)    Primary osteoarthritis involving multiple joints    Stage 3b chronic kidney disease (HCC)    Hypertensive renal disease    Facies suggestive of Down syndrome    Class 3 severe obesity due to excess calories with serious comorbidity and body mass index (BMI) of 45.0 to 49.9 in [facility])    Mental deficiency    Vitamin D deficiency        Discussions/Education provided to patients during visit:  [] Discussed the importance to stop smoking. [] Advised to monitor eating habits. [] Reviewed and discussed Imaging results. [] Reviewed and discussed Lab results. [] Discussed the importance of drinking plenty of fluids. [] Cut down on Salt and Caffeine.  [] Exercise 2-3 times weekly, if not more. [] Cut down on Sugar and Fats. [x] Continue Medications as Discussed. [x] Communicated with patient any concerns, to phone office. [x] Follow up as directed. Return if symptoms worsen or fail to improve.       Seen By:  DARREN Awad - CNP

## 2021-07-23 DIAGNOSIS — M19.031 PRIMARY OSTEOARTHRITIS OF RIGHT WRIST: Primary | ICD-10-CM

## 2021-07-23 DIAGNOSIS — M25.531 RIGHT WRIST PAIN: Primary | ICD-10-CM

## 2021-07-23 NOTE — PROGRESS NOTES
I used dx of degenerative arthropathy and it populated right wrist osteoarthritis. If that  s not ok please revise. Latha made ref. To Dr. Dandy Araujo.

## 2021-08-09 RX ORDER — FLUTICASONE PROPIONATE 50 MCG
1 SPRAY, SUSPENSION (ML) NASAL DAILY
COMMUNITY

## 2021-08-09 NOTE — PROGRESS NOTES
Have you been tested for COVID  No           Have you been told you were positive for COVID No  Have you had any known exposure to someone that is positive for COVID No  Do you have a cough                   No              Do you have shortness of breath No                 Do you have a sore throat            No                Are you having chills                    No                Are you having muscle aches. No                    Please come to the hospital wearing a mask and have your significant other wear a mask as well. Both of you should check your temperature before leaving to come here,  if it is 100 or higher please call [phone] for instruction. [facility] PRE-ADMISSION TESTING INSTRUCTIONS    The Preadmission Testing patient is instructed accordingly using the following criteria (check applicable):    ARRIVAL INSTRUCTIONS:  [x] Parking the day of Surgery is located in the Main Entrance lot. Upon entering the door, make an immediate right to the surgery reception desk    [x] Bring photo ID and insurance card    [] Bring in a copy of Living will or Durable Power of  papers.     [x] Please be sure to arrange for responsible adult to provide transportation to and from the hospital    [x] Please arrange for responsible adult to be with you for the 24 hour period post procedure due to having anesthesia      GENERAL INSTRUCTIONS:    [x] Nothing by mouth after midnight, including gum, candy, mints or water    [x] You may brush your teeth, but do not swallow any water    [x] Take medications as instructed with 1-2 oz of water    [] Stop herbal supplements and vitamins 5 days prior to procedure    [x] Follow preop dosing of blood thinners per physician instructions    [] Take 1/2 dose of evening insulin, but no insulin after midnight    [] No oral diabetic medications after midnight    [x] If diabetic and have low blood sugar or feel symptomatic, take 1-2oz apple juice only    [] Bring inhalers day of surgery    [] Bring C-PAP/ Bi-Pap day of surgery    [] Bring urine specimen day of surgery    [x] Shower or bath with soap, lather and rinse well, AM of Surgery, no lotion, powders or creams to surgical site    [] Follow bowel prep as instructed per surgeon    [x] No tobacco products within 24 hours of surgery     [x] No alcohol or illegal drug use within 24 hours of surgery.     [x] Jewelry, body piercing's, eyeglasses, contact lenses and dentures are not permitted into surgery (bring cases)      [x] Please do not wear any nail polish, make up or hair products on the day of surgery    [x] You can expect a call the business day prior to procedure to notify you if your arrival time changes    [x] If you receive a survey after surgery we would greatly appreciate your comments    [] Parent/guardian of a minor must accompany their child and remain on the premises  the entire time they are under our care     [] Pediatric patients may bring favorite toy, blanket or comfort item with them    [] A caregiver or family member must remain with the patient during their stay if they are mentally handicapped, have dementia, disoriented or unable to use a call light or would be a safety concern if left unattended    [x] Please notify surgeon if you develop any illness between now and time of surgery (cold, cough, sore throat, fever, nausea, vomiting) or any signs of infections  including skin, wounds, and dental.    [x]  The Outpatient Pharmacy is available to fill your prescription here on your day of surgery, ask your preop nurse for details    [] Other instructions    EDUCATIONAL MATERIALS PROVIDED:    [] PAT Preoperative Education Packet/Booklet     [] Medication List    [] Transfusion bracelet applied with instructions    [] Shower with soap, lather and rinse well, and use CHG wipes provided the evening before surgery as instructed    [] Incentive spirometer with instructions

## 2021-08-11 ENCOUNTER — HOSPITAL ENCOUNTER (OUTPATIENT)
Age: 76
Setting detail: OUTPATIENT SURGERY
Discharge: HOME | End: 2021-08-11
Attending: SURGERY | Admitting: SURGERY
Payer: MEDICARE

## 2021-08-11 ENCOUNTER — ANESTHESIA EVENT (OUTPATIENT)
Dept: ENDOSCOPY | Age: 76
End: 2021-08-11
Payer: MEDICARE

## 2021-08-11 ENCOUNTER — ANESTHESIA (OUTPATIENT)
Dept: ENDOSCOPY | Age: 76
End: 2021-08-11
Payer: MEDICARE

## 2021-08-11 VITALS
BODY MASS INDEX: 41.62 KG/M2 | RESPIRATION RATE: 16 BRPM | OXYGEN SATURATION: 95 % | DIASTOLIC BLOOD PRESSURE: 59 MMHG | WEIGHT: 259 LBS | TEMPERATURE: 97.8 F | HEIGHT: 66 IN | SYSTOLIC BLOOD PRESSURE: 122 MMHG | HEART RATE: 109 BPM

## 2021-08-11 VITALS
OXYGEN SATURATION: 94 % | DIASTOLIC BLOOD PRESSURE: 60 MMHG | SYSTOLIC BLOOD PRESSURE: 108 MMHG | RESPIRATION RATE: 22 BRPM

## 2021-08-11 LAB — METER GLUCOSE: 100 MG/DL (ref 74–99)

## 2021-08-11 PROCEDURE — 2580000003 HC RX 258: Performed by: SURGERY

## 2021-08-11 PROCEDURE — 2709999900 HC NON-CHARGEABLE SUPPLY: Performed by: SURGERY

## 2021-08-11 PROCEDURE — 3609027000 HC COLONOSCOPY: Performed by: SURGERY

## 2021-08-11 PROCEDURE — 3700000000 HC ANESTHESIA ATTENDED CARE: Performed by: SURGERY

## 2021-08-11 PROCEDURE — 82962 GLUCOSE BLOOD TEST: CPT

## 2021-08-11 PROCEDURE — 3700000001 HC ADD 15 MINUTES (ANESTHESIA): Performed by: SURGERY

## 2021-08-11 PROCEDURE — 7100000011 HC PHASE II RECOVERY - ADDTL 15 MIN: Performed by: SURGERY

## 2021-08-11 PROCEDURE — G0105 COLORECTAL SCRN; HI RISK IND: HCPCS | Performed by: SURGERY

## 2021-08-11 PROCEDURE — 2500000003 HC RX 250 WO HCPCS: Performed by: NURSE ANESTHETIST, CERTIFIED REGISTERED

## 2021-08-11 PROCEDURE — 6360000002 HC RX W HCPCS: Performed by: NURSE ANESTHETIST, CERTIFIED REGISTERED

## 2021-08-11 PROCEDURE — 7100000010 HC PHASE II RECOVERY - FIRST 15 MIN: Performed by: SURGERY

## 2021-08-11 RX ORDER — SODIUM CHLORIDE 9 MG/ML
INJECTION, SOLUTION INTRAVENOUS CONTINUOUS
Status: DISCONTINUED | OUTPATIENT
Start: 2021-08-11 | End: 2021-08-11 | Stop reason: HOSPADM

## 2021-08-11 RX ORDER — GLYCOPYRROLATE 1 MG/5 ML
SYRINGE (ML) INTRAVENOUS PRN
Status: DISCONTINUED | OUTPATIENT
Start: 2021-08-11 | End: 2021-08-11 | Stop reason: SDUPTHER

## 2021-08-11 RX ORDER — KETAMINE HYDROCHLORIDE 10 MG/ML
INJECTION, SOLUTION INTRAMUSCULAR; INTRAVENOUS PRN
Status: DISCONTINUED | OUTPATIENT
Start: 2021-08-11 | End: 2021-08-11 | Stop reason: SDUPTHER

## 2021-08-11 RX ORDER — PROPOFOL 10 MG/ML
INJECTION, EMULSION INTRAVENOUS PRN
Status: DISCONTINUED | OUTPATIENT
Start: 2021-08-11 | End: 2021-08-11 | Stop reason: SDUPTHER

## 2021-08-11 RX ADMIN — KETAMINE HYDROCHLORIDE 25 MG: 10 INJECTION INTRAMUSCULAR; INTRAVENOUS at 08:55

## 2021-08-11 RX ADMIN — PROPOFOL 100 MG: 10 INJECTION, EMULSION INTRAVENOUS at 08:55

## 2021-08-11 RX ADMIN — Medication 0.2 MG: at 08:55

## 2021-08-11 RX ADMIN — SODIUM CHLORIDE: 9 INJECTION, SOLUTION INTRAVENOUS at 08:46

## 2021-08-11 ASSESSMENT — LIFESTYLE VARIABLES: SMOKING_STATUS: 0

## 2021-08-11 ASSESSMENT — PAIN SCALES - GENERAL: PAINLEVEL_OUTOF10: 0

## 2021-08-11 NOTE — ANESTHESIA POSTPROCEDURE EVALUATION
Department of Anesthesiology  Postprocedure Note    Patient: Benjamin Crisostomo  MRN: [id number]  YOB: 1945  Date of evaluation: 8/11/2021  Time:  10:36 AM     Procedure Summary     Date: 08/11/21 Room / Location: [facility] / [facility]    Anesthesia Start: 2477 Anesthesia Stop: 8674    Procedure: COLONOSCOPY DIAGNOSTIC (N/A ) Diagnosis: (HISTORY OF POLYPS)    Surgeons: Ronald Luevano DO Responsible Provider: Cindy Dobson MD    Anesthesia Type: MAC ASA Status: 3          Anesthesia Type: MAC    Angeline Phase I: Angeline Score: 10    Angeline Phase II: Angeline Score: 10    Last vitals: Reviewed and per EMR flowsheets.        Anesthesia Post Evaluation    Patient location during evaluation: PACU  Patient participation: complete - patient participated  Level of consciousness: awake and alert  Airway patency: patent  Nausea & Vomiting: no vomiting and no nausea  Complications: no  Cardiovascular status: blood pressure returned to baseline  Respiratory status: acceptable  Hydration status: euvolemic

## 2021-08-11 NOTE — ANESTHESIA PRE PROCEDURE
Department of Anesthesiology  Preprocedure Note       Name:  Jaleel Hill   Age:  77 y.o.  :  1945                                          MRN:  [id number]         Date:  2021      Surgeon: Flor Beatty):  Karina Lancaster DO    Procedure: Procedure(s):  COLONOSCOPY DIAGNOSTIC    Medications prior to admission:   Prior to Admission medications    Medication Sig Start Date End Date Taking? Authorizing Provider   fluticasone (FLONASE) 50 MCG/ACT nasal spray 1 spray by Each Nostril route daily   Yes Historical Provider, MD   traMADol (ULTRAM) 50 MG tablet Take 1 tablet by mouth 3 times daily for 31 days. 21 Yes Latha Almaraz APRN - CNP   OZEMPIC, 0.25 OR 0.5 MG/DOSE, 2 MG/1.5ML SOPN Inject 0.5 mg into the skin once a week  20  Yes Historical Provider, MD   betamethasone dipropionate (DIPROLENE) 0.05 % cream Apply topically 2 times daily.  PRN for 14 days and then take 7 day break 20  Yes Alissa Tejeda APRN - CNP   Azelastine HCl 137 MCG/SPRAY SOLN 2 sprays by Nasal route daily In each nostril   Yes Historical Provider, MD   atorvastatin (LIPITOR) 20 MG tablet Take 20 mg by mouth daily   Yes Historical Provider, MD   levothyroxine (SYNTHROID) 100 MCG tablet Take 100 mcg by mouth Daily   Yes Historical Provider, MD   acetaminophen (TYLENOL) 325 MG tablet Take 325 mg by mouth 3 times daily    Yes Historical Provider, MD   amLODIPine (NORVASC) 10 MG tablet Take 10 mg by mouth daily   Yes Historical Provider, MD   enalapril (VASOTEC) 10 MG tablet Take 10 mg by mouth daily   Yes Historical Provider, MD   allopurinol (ZYLOPRIM) 100 MG tablet Take 100 mg by mouth daily   Yes Historical Provider, MD   FLUoxetine (PROZAC) 40 MG capsule Take 40 mg by mouth daily Take with 20 mg capsuleto equal 60 mg daily   Yes Historical Provider, MD   oxybutynin (DITROPAN-XL) 5 MG extended release tablet Take 5 mg by mouth nightly   Yes Historical Provider, MD   docusate sodium (COLACE) 100 MG capsule Take 100 mg by mouth 1 CAPSULE EVERY MORNING AND 2 CAPSULES NIGHTLY   Yes Historical Provider, MD   Aspirin Buf,NmZyd-LeOyi-XjGjr, (BUFFERED ASPIRIN) 325 MG TABS Take 1 tablet by mouth daily   Yes Historical Provider, MD   FLUoxetine (PROZAC) 20 MG capsule Take 20 mg by mouth daily Take with 40 mg capsule to equal 60mg daily   Yes Historical Provider, MD   diclofenac sodium (VOLTAREN) 1 % GEL Apply 4 g topically 4 times daily   Yes Historical Provider, MD   menthol-zinc oxide (CALMOSEPTINE) 0.44-20.625 % OINT ointment Apply 1 applicator topically 2 times daily Prn. Max 30 ml per day. Yes Historical Provider, MD       Current medications:    No current facility-administered medications for this encounter. Current Outpatient Medications   Medication Sig Dispense Refill    fluticasone (FLONASE) 50 MCG/ACT nasal spray 1 spray by Each Nostril route daily      traMADol (ULTRAM) 50 MG tablet Take 1 tablet by mouth 3 times daily for 31 days. 93 tablet 0    OZEMPIC, 0.25 OR 0.5 MG/DOSE, 2 MG/1.5ML SOPN Inject 0.5 mg into the skin once a week       betamethasone dipropionate (DIPROLENE) 0.05 % cream Apply topically 2 times daily.  PRN for 14 days and then take 7 day break 1 Tube 3    Azelastine HCl 137 MCG/SPRAY SOLN 2 sprays by Nasal route daily In each nostril      atorvastatin (LIPITOR) 20 MG tablet Take 20 mg by mouth daily      levothyroxine (SYNTHROID) 100 MCG tablet Take 100 mcg by mouth Daily      acetaminophen (TYLENOL) 325 MG tablet Take 325 mg by mouth 3 times daily       amLODIPine (NORVASC) 10 MG tablet Take 10 mg by mouth daily      enalapril (VASOTEC) 10 MG tablet Take 10 mg by mouth daily      allopurinol (ZYLOPRIM) 100 MG tablet Take 100 mg by mouth daily      FLUoxetine (PROZAC) 40 MG capsule Take 40 mg by mouth daily Take with 20 mg capsuleto equal 60 mg daily      oxybutynin (DITROPAN-XL) 5 MG extended release tablet Take 5 mg by mouth nightly      docusate sodium (COLACE) 100 MG capsule Take 100 mg by mouth 1 CAPSULE EVERY MORNING AND 2 CAPSULES NIGHTLY      Aspirin Buf,AnJbp-ZnAre-WhYox, (BUFFERED ASPIRIN) 325 MG TABS Take 1 tablet by mouth daily      FLUoxetine (PROZAC) 20 MG capsule Take 20 mg by mouth daily Take with 40 mg capsule to equal 60mg daily      diclofenac sodium (VOLTAREN) 1 % GEL Apply 4 g topically 4 times daily      menthol-zinc oxide (CALMOSEPTINE) 0.44-20.625 % OINT ointment Apply 1 applicator topically 2 times daily Prn. Max 30 ml per day.          Allergies:  No Known Allergies    Problem List:    Patient Active Problem List   Diagnosis Code    Facies suggestive of Down syndrome Q90.9    Hypothyroid E03.9    Obesity E66.9    DJD (degenerative joint disease) M19.90    Mental deficiency F79    Abnormal electrocardiogram (ECG) (EKG) R94.31    Unspecified abnormalities of gait and mobility R26.9    Cardiac murmur, unspecified R01.1    Stage 3 chronic kidney disease (HCC) N18.30    Hypertensive renal disease I12.9    Vitamin D deficiency E55.9    Type 2 diabetes mellitus with stage 3b chronic kidney disease, without long-term current use of insulin (HCC) E11.22, N18.32       Past Medical History:        Diagnosis Date    Chronic kidney disease (CKD), stage III (moderate) (HCC)     Diabetes mellitus (HCC)     DJD (degenerative joint disease)     Facies suggestive of Down syndrome     Hearing impaired     Hyperlipidemia     Hypertension     Hypothyroid     Mental deficiency     Urge urinary incontinence        Past Surgical History:        Procedure Laterality Date    COLONOSCOPY         Social History:    Social History     Tobacco Use    Smoking status: Never Smoker    Smokeless tobacco: Never Used   Substance Use Topics    Alcohol use: Never                                Counseling given: Not Answered      Vital Signs (Current):   Vitals:    08/09/21 1448   Weight: 259 lb (117.5 kg)   Height: 5' 6\" (1.676 m)                                              BP Readings from Last 3 Encounters:   07/22/21 122/80   07/16/21 128/88   05/13/21 122/82       NPO Status:                                                                                 BMI:   Wt Readings from Last 3 Encounters:   07/22/21 259 lb (117.5 kg)   07/16/21 277 lb 6.4 oz (125.8 kg)   05/13/21 264 lb (119.7 kg)     Body mass index is 41.8 kg/m². CBC:   Lab Results   Component Value Date    WBC 8.6 05/03/2021    RBC 4.35 05/03/2021    HGB 14.1 05/03/2021    HCT 46.0 05/03/2021    .7 05/03/2021    RDW 14.6 05/03/2021     05/03/2021       CMP:   Lab Results   Component Value Date     05/03/2021    K 5.1 05/03/2021     05/03/2021    CO2 22 05/03/2021    BUN 27 05/03/2021    CREATININE 1.5 05/03/2021    GFRAA 55 05/03/2021    LABGLOM 46 05/03/2021    GLUCOSE 167 05/13/2021    PROT 7.2 05/03/2021    CALCIUM 9.4 05/03/2021    BILITOT 0.4 05/03/2021    ALKPHOS 115 05/03/2021    AST 25 05/03/2021    ALT 22 05/03/2021       POC Tests: No results for input(s): POCGLU, POCNA, POCK, POCCL, POCBUN, POCHEMO, POCHCT in the last 72 hours.     Coags: No results found for: PROTIME, INR, APTT    HCG (If Applicable): No results found for: PREGTESTUR, PREGSERUM, HCG, HCGQUANT     ABGs: No results found for: PHART, PO2ART, UPH8RBA, ACQ0NDU, BEART, O4KOHXHU     Type & Screen (If Applicable):  No results found for: LABABO, LABRH    Drug/Infectious Status (If Applicable):  No results found for: HIV, HEPCAB    COVID-19 Screening (If Applicable): No results found for: COVID19        Anesthesia Evaluation  Patient summary reviewed and Nursing notes reviewed no history of anesthetic complications:   Airway: Mallampati: III  TM distance: >3 FB   Neck ROM: full  Mouth opening: > = 3 FB Dental:    (+) edentulous      Pulmonary: breath sounds clear to auscultation  (+) sleep apnea: on CPAP,      (-) not a current smoker                           Cardiovascular:  Exercise tolerance: good (>4 METS),   (+) hypertension:, hyperlipidemia        Rhythm: regular  Rate: normal           Beta Blocker:  Not on Beta Blocker         Neuro/Psych:   (+) psychiatric history:            GI/Hepatic/Renal:   (+) renal disease: CRI, bowel prep, morbid obesity         ROS comment: polyps. Endo/Other:    (+) DiabetesType II DM, , hypothyroidism: arthritis:., .                 Abdominal:             Vascular: negative vascular ROS. Other Findings:           Anesthesia Plan      MAC     ASA 3       Induction: intravenous. Anesthetic plan and risks discussed with patient.                       Jesse Frey MD   8/11/2021

## 2021-08-11 NOTE — INTERVAL H&P NOTE
Update History & Physical    The patient's History and Physical was reviewed with the patient and I examined the patient. There was no change. The surgical site was confirmed by the patient and me. Plan: The risks, benefits, expected outcome, and alternative to the recommended procedure have been discussed with the patient. Patient understands and wants to proceed with the procedure.      Electronically signed by Loretha Kayser, DO on 8/11/2021 at 8:46 AM

## 2021-08-11 NOTE — OP NOTE
Operative Note      Patient: Tayler Mcneal  YOB: 1945  MRN: [id number]    Date of Procedure: 8/11/2021    Pre-Op Diagnosis: HISTORY OF POLYPS    Post-Op Diagnosis: Same, diverticulosis       Procedure(s):  COLONOSCOPY DIAGNOSTIC    Surgeon(s):  Aftab Lopez DO    Assistant:   * No surgical staff found *    Anesthesia: Monitor Anesthesia Care    Estimated Blood Loss (mL): Minimal    Complications: None    Specimens:   * No specimens in log *    Implants:  * No implants in log *      Drains: * No LDAs found *    Findings: Scattered pancolonic diverticulosis. Overall somewhat of poor bowel prep with liquid stool present in most areas of the colon. Detailed Description of Procedure:   Procedure performed endoscopy sooner conscious sedation prep with anesthesia staff. Digital rectal exam is performed, there are no palpable rectal masses. Colonoscope passed transanally into the rectum and slowly to the cecum was reached intubated which point the scope was slowly withdrawn inspecting mucosal surfaces second time during withdrawal.  There are scattered diverticuli noted in all areas of the colon. Bowel prep was on the poor side with liquid stool present most areas of the colon. Much of this is suctioned away however a small polyp could be overlooked. No polyps are identified. Next colonoscopy should be in a shorter interval 5 years due to the poor overall poor prep.   Electronically signed by Aftab Lopez DO on 8/11/2021 at 9:06 AM

## 2021-08-11 NOTE — PROGRESS NOTES
Discharge instructions gone over, follow up discussed. Pt and caregiver verbalized understanding of discharge instructions, all questions answered. As well as post anesthesia instructions. Pt and caregiver states has a ride home and responsible adult there.

## 2021-08-17 DIAGNOSIS — M15.9 PRIMARY OSTEOARTHRITIS INVOLVING MULTIPLE JOINTS: ICD-10-CM

## 2021-08-17 RX ORDER — TRAMADOL HYDROCHLORIDE 50 MG/1
50 TABLET ORAL 3 TIMES DAILY
Qty: 93 TABLET | Refills: 2 | Status: SHIPPED
Start: 2021-08-17 | End: 2021-11-16 | Stop reason: SDUPTHER

## 2021-08-17 NOTE — TELEPHONE ENCOUNTER
Patients last appointment 5/13/2021. Patients next scheduled appointment   Future Appointments   Date Time Provider Rhonda Sotomayor   9/14/2021  8:15 AM SCHEDULE, YOMAIRA ALANIZ PC [facility]   9/16/2021 10:00 AM DARREN Melendez CNP [facility]   10/4/2021  2:30 PM Xiomara Castellano MD [facility]

## 2021-09-14 ENCOUNTER — NURSE ONLY (OUTPATIENT)
Dept: FAMILY MEDICINE CLINIC | Age: 76
End: 2021-09-14
Payer: MEDICARE

## 2021-09-14 DIAGNOSIS — E78.5 HYPERLIPIDEMIA, UNSPECIFIED HYPERLIPIDEMIA TYPE: ICD-10-CM

## 2021-09-14 DIAGNOSIS — I10 ESSENTIAL HYPERTENSION, MALIGNANT: ICD-10-CM

## 2021-09-14 DIAGNOSIS — E03.8 OTHER SPECIFIED HYPOTHYROIDISM: ICD-10-CM

## 2021-09-14 DIAGNOSIS — E11.22 TYPE 2 DIABETES MELLITUS WITH STAGE 3B CHRONIC KIDNEY DISEASE, WITHOUT LONG-TERM CURRENT USE OF INSULIN (HCC): Primary | ICD-10-CM

## 2021-09-14 DIAGNOSIS — N18.32 TYPE 2 DIABETES MELLITUS WITH STAGE 3B CHRONIC KIDNEY DISEASE, WITHOUT LONG-TERM CURRENT USE OF INSULIN (HCC): Primary | ICD-10-CM

## 2021-09-14 PROCEDURE — 36415 COLL VENOUS BLD VENIPUNCTURE: CPT | Performed by: NURSE PRACTITIONER

## 2021-09-16 ENCOUNTER — OFFICE VISIT (OUTPATIENT)
Dept: FAMILY MEDICINE CLINIC | Age: 76
End: 2021-09-16
Payer: MEDICARE

## 2021-09-16 VITALS
BODY MASS INDEX: 41.22 KG/M2 | HEART RATE: 117 BPM | TEMPERATURE: 97.1 F | DIASTOLIC BLOOD PRESSURE: 80 MMHG | SYSTOLIC BLOOD PRESSURE: 122 MMHG | WEIGHT: 256.5 LBS | HEIGHT: 66 IN | OXYGEN SATURATION: 96 %

## 2021-09-16 DIAGNOSIS — I10 ESSENTIAL HYPERTENSION, MALIGNANT: ICD-10-CM

## 2021-09-16 DIAGNOSIS — E55.9 VITAMIN D DEFICIENCY: ICD-10-CM

## 2021-09-16 DIAGNOSIS — Q90.9: ICD-10-CM

## 2021-09-16 DIAGNOSIS — F79 MENTAL DEFICIENCY: ICD-10-CM

## 2021-09-16 DIAGNOSIS — N18.32 STAGE 3B CHRONIC KIDNEY DISEASE (HCC): ICD-10-CM

## 2021-09-16 DIAGNOSIS — R01.1 CARDIAC MURMUR, UNSPECIFIED: ICD-10-CM

## 2021-09-16 DIAGNOSIS — Z91.81 AT HIGH RISK FOR FALLS: ICD-10-CM

## 2021-09-16 DIAGNOSIS — Z12.5 ENCOUNTER FOR PROSTATE CANCER SCREENING: ICD-10-CM

## 2021-09-16 DIAGNOSIS — N18.32 TYPE 2 DIABETES MELLITUS WITH STAGE 3B CHRONIC KIDNEY DISEASE, WITHOUT LONG-TERM CURRENT USE OF INSULIN (HCC): Primary | ICD-10-CM

## 2021-09-16 DIAGNOSIS — E03.8 OTHER SPECIFIED HYPOTHYROIDISM: ICD-10-CM

## 2021-09-16 DIAGNOSIS — E78.5 HYPERLIPIDEMIA, UNSPECIFIED HYPERLIPIDEMIA TYPE: ICD-10-CM

## 2021-09-16 DIAGNOSIS — R26.9 UNSPECIFIED ABNORMALITIES OF GAIT AND MOBILITY: ICD-10-CM

## 2021-09-16 DIAGNOSIS — E11.22 TYPE 2 DIABETES MELLITUS WITH STAGE 3B CHRONIC KIDNEY DISEASE, WITHOUT LONG-TERM CURRENT USE OF INSULIN (HCC): Primary | ICD-10-CM

## 2021-09-16 DIAGNOSIS — M15.9 PRIMARY OSTEOARTHRITIS INVOLVING MULTIPLE JOINTS: ICD-10-CM

## 2021-09-16 PROCEDURE — G8427 DOCREV CUR MEDS BY ELIG CLIN: HCPCS | Performed by: NURSE PRACTITIONER

## 2021-09-16 PROCEDURE — G8417 CALC BMI ABV UP PARAM F/U: HCPCS | Performed by: NURSE PRACTITIONER

## 2021-09-16 PROCEDURE — 1123F ACP DISCUSS/DSCN MKR DOCD: CPT | Performed by: NURSE PRACTITIONER

## 2021-09-16 PROCEDURE — 99214 OFFICE O/P EST MOD 30 MIN: CPT | Performed by: NURSE PRACTITIONER

## 2021-09-16 PROCEDURE — 3044F HG A1C LEVEL LT 7.0%: CPT | Performed by: NURSE PRACTITIONER

## 2021-09-16 PROCEDURE — 3017F COLORECTAL CA SCREEN DOC REV: CPT | Performed by: NURSE PRACTITIONER

## 2021-09-16 PROCEDURE — 1036F TOBACCO NON-USER: CPT | Performed by: NURSE PRACTITIONER

## 2021-09-16 PROCEDURE — 4040F PNEUMOC VAC/ADMIN/RCVD: CPT | Performed by: NURSE PRACTITIONER

## 2021-09-16 PROCEDURE — 2022F DILAT RTA XM EVC RTNOPTHY: CPT | Performed by: NURSE PRACTITIONER

## 2021-09-16 ASSESSMENT — ENCOUNTER SYMPTOMS
VOMITING: 0
ABDOMINAL PAIN: 0
BACK PAIN: 0
CHEST TIGHTNESS: 0
DIARRHEA: 0
VOICE CHANGE: 0
WHEEZING: 0
NAUSEA: 0
BLOOD IN STOOL: 0
CONSTIPATION: 0
SHORTNESS OF BREATH: 0
COUGH: 0
TROUBLE SWALLOWING: 0

## 2021-09-16 NOTE — PATIENT INSTRUCTIONS
Patient Education        Body Mass Index: Care Instructions  Your Care Instructions     Body mass index (BMI) can help you see if your weight is raising your risk for health problems. It uses a formula to compare how much you weigh with how tall you are. · A BMI lower than 18.5 is considered underweight. · A BMI between 18.5 and 24.9 is considered healthy. · A BMI between 25 and 29.9 is considered overweight. A BMI of 30 or higher is considered obese. If your BMI is in the normal range, it means that you have a lower risk for weight-related health problems. If your BMI is in the overweight or obese range, you may be at increased risk for weight-related health problems, such as high blood pressure, heart disease, stroke, arthritis or joint pain, and diabetes. If your BMI is in the underweight range, you may be at increased risk for health problems such as fatigue, lower protection (immunity) against illness, muscle loss, bone loss, hair loss, and hormone problems. BMI is just one measure of your risk for weight-related health problems. You may be at higher risk for health problems if you are not active, you eat an unhealthy diet, or you drink too much alcohol or use tobacco products. Follow-up care is a key part of your treatment and safety. Be sure to make and go to all appointments, and call your doctor if you are having problems. It's also a good idea to know your test results and keep a list of the medicines you take. How can you care for yourself at home? · Practice healthy eating habits. This includes eating plenty of fruits, vegetables, whole grains, lean protein, and low-fat dairy. · If your doctor recommends it, get more exercise. Walking is a good choice. Bit by bit, increase the amount you walk every day. Try for at least 30 minutes on most days of the week. · Do not smoke. Smoking can increase your risk for health problems.  If you need help quitting, talk to your doctor about stop-smoking programs and medicines. These can increase your chances of quitting for good. · Limit alcohol to 2 drinks a day for men and 1 drink a day for women. Too much alcohol can cause health problems. If you have a BMI higher than 25  · Your doctor may do other tests to check your risk for weight-related health problems. This may include measuring the distance around your waist. A waist measurement of more than 40 inches in men or 35 inches in women can increase the risk of weight-related health problems. · Talk with your doctor about steps you can take to stay healthy or improve your health. You may need to make lifestyle changes to lose weight and stay healthy, such as changing your diet and getting regular exercise. If you have a BMI lower than 18.5  · Your doctor may do other tests to check your risk for health problems. · Talk with your doctor about steps you can take to stay healthy or improve your health. You may need to make lifestyle changes to gain or maintain weight and stay healthy, such as getting more healthy foods in your diet and doing exercises to build muscle. Where can you learn more? Go to https://Flux Powerfrannie.Anzhi.com. org and sign in to your MeUndies account. Enter S176 in the Blue Rooster box to learn more about \"Body Mass Index: Care Instructions. \"     If you do not have an account, please click on the \"Sign Up Now\" link. Current as of: March 17, 2021               Content Version: 12.9  © 5610-1620 Healthwise, Incorporated. Care instructions adapted under license by [facility] ([facility]). If you have questions about a medical condition or this instruction, always ask your healthcare professional. [place] [address] any warranty or liability for your use of this information. Patient Education        Learning About Carbohydrate (Carb) Counting and Eating Out When You Have Diabetes  Why plan your meals? Meal planning can be a key part of managing diabetes. Planning meals and snacks with the right balance of carbohydrate, protein, and fat can help you keep your blood sugar at the target level you set with your doctor. You don't have to eat special foods. You can eat what your family eats, including sweets once in a while. But you do have to pay attention to how often you eat and how much you eat of certain foods. You may want to work with a dietitian or a certified diabetes educator. He or she can give you tips and meal ideas and can answer your questions about meal planning. This health professional can also help you reach a healthy weight if that is one of your goals. What should you know about eating carbs? Managing the amount of carbohydrate (carbs) you eat is an important part of healthy meals when you have diabetes. Carbohydrate is found in many foods. · Learn which foods have carbs. And learn the amounts of carbs in different foods. ? Bread, cereal, pasta, and rice have about 15 grams of carbs in a serving. A serving is 1 slice of bread (1 ounce), ½ cup of cooked cereal, or 1/3 cup of cooked pasta or rice. ? Fruits have 15 grams of carbs in a serving. A serving is 1 small fresh fruit, such as an apple or orange; ½ of a banana; ½ cup of cooked or canned fruit; ½ cup of fruit juice; 1 cup of melon or raspberries; or 2 tablespoons of dried fruit. ? Milk and no-sugar-added yogurt have 15 grams of carbs in a serving. A serving is 1 cup of milk or 2/3 cup of no-sugar-added yogurt. ? Starchy vegetables have 15 grams of carbs in a serving. A serving is ½ cup of mashed potatoes or sweet potato; 1 cup winter squash; ½ of a small baked potato; ½ cup of cooked beans; or ½ cup cooked corn or green peas. · Learn how much carbs to eat each day and at each meal. A dietitian or CDE can teach you how to keep track of the amount of carbs you eat. This is called carbohydrate counting.   · If you are not sure how to count carbohydrate grams, use the Plate Method to plan meals. It is a good, quick way to make sure that you have a balanced meal. It also helps you spread carbs throughout the day. ? Divide your plate by types of foods. Put non-starchy vegetables on half the plate, meat or other protein food on one-quarter of the plate, and a grain or starchy vegetable in the final quarter of the plate. To this you can add a small piece of fruit and 1 cup of milk or yogurt, depending on how many carbs you are supposed to eat at a meal.  · Try to eat about the same amount of carbs at each meal. Do not \"save up\" your daily allowance of carbs to eat at one meal.  · Proteins have very little or no carbs per serving. Examples of proteins are beef, chicken, turkey, fish, eggs, tofu, cheese, cottage cheese, and peanut butter. A serving size of meat is 3 ounces, which is about the size of a deck of cards. Examples of meat substitute serving sizes (equal to 1 ounce of meat) are 1/4 cup of cottage cheese, 1 egg, 1 tablespoon of peanut butter, and ½ cup of tofu. How can you eat out and still eat healthy? · Learn to estimate the serving sizes of foods that have carbohydrate. If you measure food at home, it will be easier to estimate the amount in a serving of restaurant food. · If the meal you order has too much carbohydrate (such as potatoes, corn, or baked beans), ask to have a low-carbohydrate food instead. Ask for a salad or green vegetables. · If you use insulin, check your blood sugar before and after eating out to help you plan how much to eat in the future. · If you eat more carbohydrate at a meal than you had planned, take a walk or do other exercise. This will help lower your blood sugar. What are some tips for eating healthy? · Limit saturated fat, such as the fat from meat and dairy products. This is a healthy choice because people who have diabetes are at higher risk of heart disease. So choose lean cuts of meat and nonfat or low-fat dairy products.  Use olive or canola oil instead of butter or shortening when cooking. · Don't skip meals. Your blood sugar may drop too low if you skip meals and take insulin or certain medicines for diabetes. · Check with your doctor before you drink alcohol. Alcohol can cause your blood sugar to drop too low. Alcohol can also cause a bad reaction if you take certain diabetes medicines. Follow-up care is a key part of your treatment and safety. Be sure to make and go to all appointments, and call your doctor if you are having problems. It's also a good idea to know your test results and keep a list of the medicines you take. Where can you learn more? Go to https://Zhui Xinpepiceweb.[employer] Internet Services. org and sign in to your OneMedNet account. Enter Z011 in the Cryptonator box to learn more about \"Learning About Carbohydrate (Carb) Counting and Eating Out When You Have Diabetes. \"     If you do not have an account, please click on the \"Sign Up Now\" link. Current as of: August 31, 2020               Content Version: 12.9  © 2006-2021 [employer]. Care instructions adapted under license by [facility] ([facility]). If you have questions about a medical condition or this instruction, always ask your healthcare professional. [place] [address] any warranty or liability for your use of this information. Patient Education        Diabetes and Preventing Falls: Care Instructions  Your Care Instructions     If you are an older adult who has diabetes, you may have a higher risk of falling. Complications of diabetes--such as nerve damage, foot problems, and reduced vision--may increase your risk of a fall. Some of your medicines also may add to your risk. By making your home safer, you can lower your risk of falling. Doing things to prevent diabetes complications may also help to lower your risk. You can make your home safer with a few simple measures. Follow-up care is a key part of your treatment and safety.  Be sure to make and go to all appointments, and call your doctor if you are having problems. It's also a good idea to know your test results and keep a list of the medicines you take. How can you care for yourself at home? Taking care of yourself  · Keep your blood sugar at a target level (which you set with your doctor). · Exercise regularly to improve your strength, muscle tone, and balance. Walk if you can. Swimming may be a good choice if you cannot walk easily. · Have your vision checked as often as your doctor recommends. It is usually once a year or more often if you have eye problems. · Know the side effects of the medicines you take. Ask your doctor or pharmacist whether the medicines you take can affect your balance. Sleeping pills or sedatives can affect your balance. · Limit the amount of alcohol you drink. Alcohol can impair your balance and other senses. · Have your doctor check your feet during each visit. If you have a foot problem, see your doctor. Preventing falls at home  · Remove raised doorway thresholds, throw rugs, and clutter. Repair loose carpet or raised areas in the floor. · Move furniture and electrical cords to keep them out of walking paths. · Use nonskid floor wax, and wipe up spills right away, especially on ceramic tile floors. · If you use a walker or cane, put rubber tips on it. If you use crutches, clean the bottoms of them regularly with an abrasive pad, such as steel wool. · Keep your house well lit, especially [place], and outside walkways. Use night-lights in areas such as hallways and bathrooms. Add extra light switches or use remote switches (such as switches that go on or off when you clap your hands) to make it easier to turn lights on if you have to get up during the night. · Install sturdy handrails on stairways. Put grab bars near your shower, bathtub, and toilet. · Store household items on low shelves so that you do not have to climb or reach high.  Or use a reaching device that you can get at a medical supply store. If you have to climb for something, use a step stool with handrails, or ask someone to get it for you. · Keep a cordless phone and a flashlight with new batteries by your bed. If possible, put a phone in each of the main rooms of your house, or carry a cell phone in case you fall and cannot reach a phone. Or you can wear a device around your neck or wrist. You push a button that sends a signal for help. · Wear low-heeled shoes that fit well and give your feet good support. Use footwear with nonskid soles. Check the heels and soles of your shoes for wear. Repair or replace worn heels or soles. · Do not wear socks without shoes on wood floors. · Walk on the grass when the sidewalks are slippery. If you live in an area that gets snow and ice in the winter, sprinkle salt on slippery steps and sidewalks. Where can you learn more? Go to https://UK Work Study.[employer]. org and sign in to your Burpple account. Enter Y720 in the [facility] box to learn more about \"Diabetes and Preventing Falls: Care Instructions. \"     If you do not have an account, please click on the \"Sign Up Now\" link. Current as of: December 7, 2020               Content Version: 12.9  © 7115-8730 Healthwise, Incorporated. Care instructions adapted under license by [facility] ([facility]). If you have questions about a medical condition or this instruction, always ask your healthcare professional. [place] [address] any warranty or liability for your use of this information.

## 2021-09-16 NOTE — PROGRESS NOTES
Rachel Alvarado : 1945 Sex: male  Age: 77 y.o. Chief Complaint   Patient presents with    Discuss Labs       Assessment and Plan:    Sandy Lott was seen today for discuss labs. Diagnoses and all orders for this visit:    Type 2 diabetes mellitus with stage 3b chronic kidney disease, without long-term current use of insulin (HCC)  -     Hemoglobin A1C; Future  -     Microalbumin, Ur; Future  -     Urinalysis; Future    Other specified hypothyroidism  -     TSH without Reflex; Future    Essential hypertension, malignant  -     CBC Auto Differential; Future  -     Comprehensive Metabolic Panel, Fasting; Future    Hyperlipidemia, unspecified hyperlipidemia type  -     Lipid Panel; Future    Primary osteoarthritis involving multiple joints    Cardiac murmur, unspecified    Stage 3b chronic kidney disease ([facility] [address]    Facies suggestive of Down syndrome    Mental deficiency    Vitamin D deficiency  -     Vitamin D 25 Hydroxy; Future    Unspecified abnormalities of gait and mobility    BMI 40.0-44.9, adult ([facility])    Encounter for prostate cancer screening  -     PSA screening; Future    At high risk for falls        USPTF:    () Colorectal Cancer: Screening --Adults aged 48 to 76 years  Grade: A (Recommended) recommends screening for colorectal cancer starting at age 48 years and continuing until age 76 years. (Non reactive) HIV: Screening - Adolescents and Adults  Grade: A (Recommended) recommends that clinicians screen for HIV infection in ages 13 to 72 years. (B/P 122/82) High Blood Pressure: Screening and Home Monitoring -- Adults  Grade: A (Recommended) recommends screening for high blood pressure in ages 25 years or older. obtain measurements outside of the clinical setting for diagnostic confirmation before starting treatment.  Annual screening for adults aged 36 years or older or those who are at increased risk for blood pressure    (, Trig 103, LDL 63)  Lipid Disorders in Adults: Screening -- Men 28 and Older  Grade: A (Recommended) recommends screening men aged 28 and older for lipid disorders. (Nondrinker) Alcohol Misuse: Screening and Behavioral Counseling Interventions in Primary Care -- Adults  Grade: B (Recommended) recommends that clinicians screen adults aged 25 years or older for alcohol misuse and provide persons engaged in risky or hazardous drinking with brief behavioral counseling interventions to reduce alcohol misuse. (AIC 6.4) Abnormal Blood Glucose and Type 2 Diabetes Mellitus: Screening -- Adults aged 36 to 79 years who are overweight or obese Grade: B (Recommended)    (Never smoked) Abdominal Aortic Aneurysm: Screening -- Men Ages 72 to 76 Years Who Have Ever Smoked. Grade: B (Recommended) recommends one-time screening for abdominal aortic aneurysm (AAA) with ultrasonography in men ages 72 to 76 years who have ever smoked. (BMI 41.40)  Obesity: Screening for and Management of-- All Adults  Grade: B(Recommended) recommends screening all adults for obesity. Clinicians should offer or refer patients with a body mass index (BMI) of 30 kg/m2 or higher to intensive, multicomponent behavioral interventions. (Not at high risk) Hepatitis C Virus Infection: Screening--Adults at High Risk and Adults born between 1945 and 1965  Grade: B (Recommended) recommends screening for hepatitis C virus (HCV) infection in persons at high risk for infection. The USPSTF also recommends offering 1-time screening for HCV infection to adults born between 80 and 1965. (High risk)  Fall Prevention -- Exercise/Physical Therapy: Community-dwelling Adults 72 Years or Older, Increased Risk for Falls   Grade: B (Recommended) recommends exercise or physical therapy to prevent falls in community-dwelling adults aged 72 years or older who are at increased risk for falls.     (Negative)  Depression: Screening -- General adult population, including pregnant and postpartum women  Grade: B(Recommended) recommends screening for depression in the general adult population,  Screening should be implemented with adequate systems in place to ensure accurate diagnosis, effective treatment, and appropriate follow-up. (2020) Glaucoma: Screening - Adults and Diabetic Eye Exam     (2020) Vitamin D Deficiency: Screening --       (TSH 3.79) Thyroid Dysfunction: Screening --      (2020) Skin Cancer: Screening --Asymptomatic adults  Grade: I(Uncertain)     (Done) Carotid Artery Stenosis: Screening -- Adults  Grade: D (Not Recommended)     (2020) Coronary Heart Disease: Screening with Electrocardiography--Adults at Low Risk  Grade: D (Not Recommended) echocardiogram done in 2017 along with a chemical stress test    (2019) Prostate Cancer: Prostate-Specific Antigen (PSA)-Based Screening -- All Men   PSA 1.75 Dec 29 2020  PSA 1.8 December 13, 2019  PSA 2.0 November 28, 2018  PSA 1.9 September 2017  PSA 1.4 July 2017  PSA 1.2 July 2016      Educational materials  printed for patient's review and were included in patient instructions on his After Visit Summary and given to patient at the end of visit. Counseled regarding above diagnosis, including possible risks and complications,  especially if left uncontrolled. Counseled regarding the possible side effects, risks, benefits and alternatives to treatment; patient and/or guardian verbalizes understanding, agrees, feels comfortable with and wishes to proceed with above treatment plan. Advised patient to call with any new medication issues, and read all Rx info from pharmacy to assure aware of all possible risks and side effects of medication before taking. Reviewed age and gender appropriate health screening exams and vaccinations.   Advised patient regarding importance of keeping up with recommended health maintenance and to schedule as soon as possible if overdue, as this is important in assessing for undiagnosed pathology, especially cancer, as well as protecting against potentially harmful/life threatening disease. Patient verbalizes understanding and agrees with above counseling, assessment and plan. All questions answered. On 09/16/21 I have spent 30 reviewing previous notes, test results and face to face with the patient discussing the diagnosis and importance of compliance with the treatment plan as well as documenting on the day of the visit. Educational materials exercises printed for patient's review and were included in patient instructions on their After Visit Summary and given to patient at the end of visit.      Return in about 6 months (around 3/16/2022) for Routine Visit with Labs. This is a very pleasant 60-year-old white male who presents today for evaluation and management of chronic medical problems. Current medication list reviewed. The patient is tolerating all medications well without adverse events or known side effects. The patient does understand the risk and benefits of the prescribed medications. The patient is not up-to-date on all age-appropriate wellness issues. Patient denies any reccent hospitalizations or ER visit. No Acute Complaints reported:    CHRONIC CONDITION:    DM: Mild intensity but controlled on   OZEMPIC, 0.25 OR 0.5 MG/DOSE, 2 MG/1.5ML SOPN, Inject 0.5 mg into the skin once a week , Disp: , Rfl:    remains without symptoms, no weight loss, no increase in thirst, or urination and no low blood sugar incidents. No reports of poor circulation or issues with feet ulceration or injury. Is better when compliant with diet and medications. Vision Exam Up to Date:                             Y 2021  Currently on an ACE  :                                Y  Most Recent Hgb A1c <7:                           Y   AIC 6.4   Microalbumin Test Reviewed:                       Y   Foot Exam Up To Date:                               Y 2021  Flu Shot Up To Date:                                   Praveen Nair 2020  Has patient been seen by Neurology           N   Currently on a Statin                                    Y       HTN: Stable hypertension, controlled on     amLODIPine (NORVASC) 10 MG tablet, Take 10 mg by mouth daily, Disp: , Rfl:    enalapril (VASOTEC) 10 MG tablet, Take 10 mg by mouth daily, Disp: , Rfl:    remains at a mild intensity but overall good control, without symptoms, no ringing in the ears, no headaches and no nose bleeds. Better on medications. CAD and CHF: Patient currently not on a Betablocker but is on an ACE for LVSD. Tobacco Use: N    Flu Shot Up To Date: Y     Meds Listed in Medical Record: Y     Hyperlipidemia: Mild in intensity but controlled on    atorvastatin (LIPITOR) 20 MG tablet, Take 20 mg by mouth daily, Disp: , Rfl:  without symptoms, no complications with dietary treatment regimen reporting no side effects or intolereances. Compliant with treatment and diet. No muscle aches, new joint pains or abd pain. Hypothyroid: Remains controlled, without symptoms, on    levothyroxine (SYNTHROID) 100 MCG tablet, Take 100 mcg by mouth Daily, Disp: , Rfl:   with no evidence of weight gain, palpitations, fatigue, difficulty swallowing, neck prominence or hair loss. Is better on medications and worsens with forgetting to take medications. Has been on medications religiously since last visit. Depression/Anxiety/PBA/GOUT: Stable depression with generalized anxiety. Mild in intensity but well controlled on   traMADol (ULTRAM) 50 MG tablet, Take 1 tablet by mouth 3 times daily for 93 days. , Disp: 93 tablet, Rfl:   fluticasone (FLONASE) 50 MCG/ACT nasal spray, 1 spray by Each Nostril route daily, Disp: , Rfl:   betamethasone dipropionate (DIPROLENE) 0.05 % cream, Apply topically 2 times daily.  PRN for 14 days and then take 7 day break, Disp: 1 Tube, Rfl: 3  Azelastine HCl 137 MCG/SPRAY SOLN, 2 sprays by Nasal route daily In each nostril, Disp: , Rfl:  acetaminophen (TYLENOL) 325 MG tablet, Take 325 mg by mouth 3 times daily , Disp: , Rfl:   allopurinol (ZYLOPRIM) 100 MG tablet, Take 100 mg by mouth daily, Disp: , Rfl:   FLUoxetine (PROZAC) 40 MG capsule, Take 40 mg by mouth daily Take with 20 mg capsuleto equal 60 mg daily, Disp: , Rfl:   oxybutynin (DITROPAN-XL) 5 MG extended release tablet, Take 5 mg by mouth nightly, Disp: , Rfl:   docusate sodium (COLACE) 100 MG capsule, Take 100 mg by mouth 1 CAPSULE EVERY MORNING AND 2 CAPSULES NIGHTLY, Disp: , Rfl:   Aspirin Buf,[id number], (BUFFERED ASPIRIN) 325 MG TABS, Take 1 tablet by mouth daily, Disp: , Rfl:   FLUoxetine (PROZAC) 20 MG capsule, Take 20 mg by mouth daily Take with 40 mg capsule to equal 60mg daily, Disp: , Rfl:   diclofenac sodium (VOLTAREN) 1 % GEL, Apply 4 g topically 4 times daily, Disp: , Rfl:   menthol-zinc oxide (CALMOSEPTINE) 0.44-20.625 % OINT ointment, Apply 1 applicator topically 2 times daily Prn. Max 30 ml per day., Disp: , Rfl:    without symptoms, no weight gain, no increase in anxiety, no suicidal or homicidal ideation's no unexplained fatigue, or relationship difficulties relayed this visit. Review of Systems   Constitutional: Negative for activity change, chills, diaphoresis, fatigue, fever and unexpected weight change. HENT: Negative for trouble swallowing and voice change. Eyes: Negative for visual disturbance. Respiratory: Negative for cough, chest tightness, shortness of breath and wheezing. Cardiovascular: Negative for chest pain, palpitations and leg swelling. Gastrointestinal: Negative for abdominal pain, blood in stool, constipation, diarrhea, nausea and vomiting. Endocrine: Negative for polydipsia, polyphagia and polyuria. Genitourinary: Negative for dysuria, enuresis, frequency and hematuria. Musculoskeletal: Negative for arthralgias, back pain, gait problem, joint swelling, myalgias and neck stiffness. Skin: Negative for rash.         Bruising noted along the chest wall no other falls   Neurological: Negative for dizziness, seizures, syncope, facial asymmetry, weakness, light-headedness, numbness and headaches. Hematological: Does not bruise/bleed easily. Psychiatric/Behavioral: Negative for behavioral problems, confusion, hallucinations and suicidal ideas. The patient is not nervous/anxious. Current Outpatient Medications:     traMADol (ULTRAM) 50 MG tablet, Take 1 tablet by mouth 3 times daily for 93 days. , Disp: 93 tablet, Rfl: 2    fluticasone (FLONASE) 50 MCG/ACT nasal spray, 1 spray by Each Nostril route daily, Disp: , Rfl:     OZEMPIC, 0.25 OR 0.5 MG/DOSE, 2 MG/1.5ML SOPN, Inject 0.5 mg into the skin once a week , Disp: , Rfl:     betamethasone dipropionate (DIPROLENE) 0.05 % cream, Apply topically 2 times daily.  PRN for 14 days and then take 7 day break, Disp: 1 Tube, Rfl: 3    Azelastine HCl 137 MCG/SPRAY SOLN, 2 sprays by Nasal route daily In each nostril, Disp: , Rfl:     atorvastatin (LIPITOR) 20 MG tablet, Take 20 mg by mouth daily, Disp: , Rfl:     levothyroxine (SYNTHROID) 100 MCG tablet, Take 100 mcg by mouth Daily, Disp: , Rfl:     acetaminophen (TYLENOL) 325 MG tablet, Take 325 mg by mouth 3 times daily , Disp: , Rfl:     amLODIPine (NORVASC) 10 MG tablet, Take 10 mg by mouth daily, Disp: , Rfl:     enalapril (VASOTEC) 10 MG tablet, Take 10 mg by mouth daily, Disp: , Rfl:     allopurinol (ZYLOPRIM) 100 MG tablet, Take 100 mg by mouth daily, Disp: , Rfl:     FLUoxetine (PROZAC) 40 MG capsule, Take 40 mg by mouth daily Take with 20 mg capsuleto equal 60 mg daily, Disp: , Rfl:     oxybutynin (DITROPAN-XL) 5 MG extended release tablet, Take 5 mg by mouth nightly, Disp: , Rfl:     docusate sodium (COLACE) 100 MG capsule, Take 100 mg by mouth 1 CAPSULE EVERY MORNING AND 2 CAPSULES NIGHTLY, Disp: , Rfl:     Aspirin Buf,[id number], (BUFFERED ASPIRIN) 325 MG TABS, Take 1 tablet by mouth daily, Disp: , Rfl:     FLUoxetine (PROZAC) 20 MG capsule, Take 20 mg by mouth daily Take with 40 mg capsule to equal 60mg daily, Disp: , Rfl:     diclofenac sodium (VOLTAREN) 1 % GEL, Apply 4 g topically 4 times daily, Disp: , Rfl:     menthol-zinc oxide (CALMOSEPTINE) 0.44-20.625 % OINT ointment, Apply 1 applicator topically 2 times daily Prn. Max 30 ml per day., Disp: , Rfl:   No Known Allergies    Past Medical History:   Diagnosis Date    Chronic kidney disease (CKD), stage III (moderate) (HCC)     Diabetes mellitus (HCC)     DJD (degenerative joint disease)     Facies suggestive of Down syndrome     Hearing impaired     Hyperlipidemia     Hypertension     Hypothyroid     Mental deficiency     Urge urinary incontinence      Past Surgical History:   Procedure Laterality Date    COLONOSCOPY      COLONOSCOPY N/A 8/11/2021    COLONOSCOPY DIAGNOSTIC performed by Dar Nuñez DO at [facility] ENDOSCOPY     History reviewed. No pertinent family history. Social History     Socioeconomic History    Marital status: Single     Spouse name: Not on file    Number of children: Not on file    Years of education: Not on file    Highest education level: Not on file   Occupational History    Not on file   Tobacco Use    Smoking status: Never Smoker    Smokeless tobacco: Never Used   Vaping Use    Vaping Use: Never used   Substance and Sexual Activity    Alcohol use: Never    Drug use: Never    Sexual activity: Not on file   Other Topics Concern    Not on file   Social History Narrative    Not on file     Social Determinants of Health     Financial Resource Strain:     Difficulty of Paying Living Expenses:    Food Insecurity:     Worried About Running Out of Food in the Last Year:     [address] in the Last Year:    Transportation Needs:     Lack of Transportation (Medical):      Lack of Transportation (Non-Medical):    Physical Activity:     Days of Exercise per Week:     Minutes of Exercise per Session:    Stress:     Feeling of Stress :    Social Connections:     Frequency of Communication with Friends and Family:     Frequency of Social Gatherings with Friends and Family:     Attends Confucianism Services:     Active Member of Clubs or Organizations:     Attends Club or Organization Meetings:     Marital Status:    Intimate Partner Violence:     Fear of Current or Ex-Partner:     Emotionally Abused:     Physically Abused:     Sexually Abused:        Vitals:    09/16/21 1016   BP: 122/80   Site: Left Upper Arm   Position: Sitting   Cuff Size: Medium Adult   Pulse: 117   Temp: 97.1 °F (36.2 °C)   TempSrc: Temporal   SpO2: 96%   Weight: 256 lb 8 oz (116.3 kg)   Height: 5' 6\" (1.676 m)       Physical Exam  Vitals and nursing note reviewed. Constitutional:       Appearance: Normal appearance. HENT:      Head: Normocephalic. Right Ear: Tympanic membrane and ear canal normal. There is no impacted cerumen. Left Ear: Tympanic membrane and ear canal normal. There is no impacted cerumen. Nose: Nose normal.      Mouth/Throat:      Mouth: Mucous membranes are dry. Eyes:      Extraocular Movements: Extraocular movements intact. Pupils: Pupils are equal, round, and reactive to light. Neck:      Vascular: No carotid bruit. Cardiovascular:      Rate and Rhythm: Normal rate and regular rhythm. Pulses: Normal pulses. Heart sounds: Normal heart sounds. No murmur heard. No friction rub. No gallop. Pulmonary:      Effort: Pulmonary effort is normal. No respiratory distress. Breath sounds: Normal breath sounds. No stridor. No wheezing, rhonchi or rales. Chest:      Chest wall: No tenderness. Abdominal:      General: Bowel sounds are normal. There is no distension. Palpations: Abdomen is soft. Musculoskeletal:         General: No swelling, tenderness, deformity or signs of injury. Cervical back: No rigidity. No muscular tenderness. Right lower leg: No edema.       Left lower leg: No edema. Comments: Chest with a bruise about 14 cm X 4 cm midline no tenderness no signs of infection. Lymphadenopathy:      Cervical: No cervical adenopathy. Skin:     General: Skin is warm and dry. Capillary Refill: Capillary refill takes 2 to 3 seconds. Findings: No bruising, lesion or rash. Neurological:      General: No focal deficit present. Mental Status: He is alert and oriented to person, place, and time. Motor: No weakness. Gait: Gait normal.   Psychiatric:         Attention and Perception: Attention normal.         Mood and Affect: Mood normal.         Behavior: Behavior normal.         Thought Content: Thought content does not include homicidal or suicidal ideation. Thought content does not include homicidal or suicidal plan. Visual inspection:  Deformity/amputation: absent  Skin lesions/pre-ulcerative calluses: absent  Edema: right- negative, left- negative    Sensory exam:  Monofilament sensation: normal  (minimum of 5 random plantar locations tested, avoiding callused areas - > 1 area with absence of sensation is + for neuropathy)    Plus at least one of the following:  Pulses: normal,   Pinprick: Intact       Seen By:  DARREN Mohamud CNP  On the basis of positive falls risk screening, assessment and plan is as follows: home safety tips provided.

## 2021-10-15 RX ORDER — ZOSTER VACCINE RECOMBINANT, ADJUVANTED 50 MCG/0.5
0.5 KIT INTRAMUSCULAR SEE ADMIN INSTRUCTIONS
Qty: 0.5 ML | Refills: 0 | Status: SHIPPED | OUTPATIENT
Start: 2021-10-15 | End: 2022-04-13

## 2021-11-03 ENCOUNTER — TELEPHONE (OUTPATIENT)
Dept: FAMILY MEDICINE CLINIC | Age: 76
End: 2021-11-03

## 2021-11-04 ENCOUNTER — OFFICE VISIT (OUTPATIENT)
Dept: FAMILY MEDICINE CLINIC | Age: 76
End: 2021-11-04
Payer: MEDICARE

## 2021-11-04 ENCOUNTER — OFFICE VISIT (OUTPATIENT)
Dept: ORTHOPEDIC SURGERY | Age: 76
End: 2021-11-04
Payer: MEDICARE

## 2021-11-04 VITALS
WEIGHT: 234 LBS | RESPIRATION RATE: 20 BRPM | TEMPERATURE: 97.9 F | HEIGHT: 65 IN | OXYGEN SATURATION: 98 % | SYSTOLIC BLOOD PRESSURE: 128 MMHG | HEART RATE: 100 BPM | BODY MASS INDEX: 38.99 KG/M2 | DIASTOLIC BLOOD PRESSURE: 80 MMHG

## 2021-11-04 VITALS — BODY MASS INDEX: 38.85 KG/M2 | RESPIRATION RATE: 20 BRPM | WEIGHT: 233.2 LBS | HEIGHT: 65 IN

## 2021-11-04 DIAGNOSIS — N18.32 STAGE 3B CHRONIC KIDNEY DISEASE (HCC): ICD-10-CM

## 2021-11-04 DIAGNOSIS — R26.9 UNSPECIFIED ABNORMALITIES OF GAIT AND MOBILITY: ICD-10-CM

## 2021-11-04 DIAGNOSIS — I10 ESSENTIAL HYPERTENSION, MALIGNANT: ICD-10-CM

## 2021-11-04 DIAGNOSIS — M19.031 ARTHRITIS OF RIGHT WRIST: ICD-10-CM

## 2021-11-04 DIAGNOSIS — Q90.9: ICD-10-CM

## 2021-11-04 DIAGNOSIS — F79 MENTAL DEFICIENCY: ICD-10-CM

## 2021-11-04 DIAGNOSIS — Z00.00 ROUTINE GENERAL MEDICAL EXAMINATION AT A HEALTH CARE FACILITY: Primary | ICD-10-CM

## 2021-11-04 DIAGNOSIS — R01.1 CARDIAC MURMUR, UNSPECIFIED: ICD-10-CM

## 2021-11-04 DIAGNOSIS — G56.01 CARPAL TUNNEL SYNDROME OF RIGHT WRIST: Primary | ICD-10-CM

## 2021-11-04 DIAGNOSIS — E03.8 OTHER SPECIFIED HYPOTHYROIDISM: ICD-10-CM

## 2021-11-04 DIAGNOSIS — M15.9 PRIMARY OSTEOARTHRITIS INVOLVING MULTIPLE JOINTS: ICD-10-CM

## 2021-11-04 DIAGNOSIS — E11.22 TYPE 2 DIABETES MELLITUS WITH STAGE 3B CHRONIC KIDNEY DISEASE, WITHOUT LONG-TERM CURRENT USE OF INSULIN (HCC): ICD-10-CM

## 2021-11-04 DIAGNOSIS — E78.5 HYPERLIPIDEMIA, UNSPECIFIED HYPERLIPIDEMIA TYPE: ICD-10-CM

## 2021-11-04 DIAGNOSIS — N18.32 TYPE 2 DIABETES MELLITUS WITH STAGE 3B CHRONIC KIDNEY DISEASE, WITHOUT LONG-TERM CURRENT USE OF INSULIN (HCC): ICD-10-CM

## 2021-11-04 DIAGNOSIS — Z91.81 AT HIGH RISK FOR FALLS: ICD-10-CM

## 2021-11-04 DIAGNOSIS — E55.9 VITAMIN D DEFICIENCY: ICD-10-CM

## 2021-11-04 PROCEDURE — G8417 CALC BMI ABV UP PARAM F/U: HCPCS | Performed by: ORTHOPAEDIC SURGERY

## 2021-11-04 PROCEDURE — 99204 OFFICE O/P NEW MOD 45 MIN: CPT | Performed by: ORTHOPAEDIC SURGERY

## 2021-11-04 PROCEDURE — G8427 DOCREV CUR MEDS BY ELIG CLIN: HCPCS | Performed by: ORTHOPAEDIC SURGERY

## 2021-11-04 PROCEDURE — 4040F PNEUMOC VAC/ADMIN/RCVD: CPT | Performed by: ORTHOPAEDIC SURGERY

## 2021-11-04 PROCEDURE — 1036F TOBACCO NON-USER: CPT | Performed by: ORTHOPAEDIC SURGERY

## 2021-11-04 PROCEDURE — 4040F PNEUMOC VAC/ADMIN/RCVD: CPT | Performed by: NURSE PRACTITIONER

## 2021-11-04 PROCEDURE — 1123F ACP DISCUSS/DSCN MKR DOCD: CPT | Performed by: NURSE PRACTITIONER

## 2021-11-04 PROCEDURE — G8484 FLU IMMUNIZE NO ADMIN: HCPCS | Performed by: ORTHOPAEDIC SURGERY

## 2021-11-04 PROCEDURE — 3044F HG A1C LEVEL LT 7.0%: CPT | Performed by: NURSE PRACTITIONER

## 2021-11-04 PROCEDURE — G8484 FLU IMMUNIZE NO ADMIN: HCPCS | Performed by: NURSE PRACTITIONER

## 2021-11-04 PROCEDURE — 3017F COLORECTAL CA SCREEN DOC REV: CPT | Performed by: NURSE PRACTITIONER

## 2021-11-04 PROCEDURE — 3017F COLORECTAL CA SCREEN DOC REV: CPT | Performed by: ORTHOPAEDIC SURGERY

## 2021-11-04 PROCEDURE — G0439 PPPS, SUBSEQ VISIT: HCPCS | Performed by: NURSE PRACTITIONER

## 2021-11-04 PROCEDURE — 20605 DRAIN/INJ JOINT/BURSA W/O US: CPT | Performed by: ORTHOPAEDIC SURGERY

## 2021-11-04 PROCEDURE — 1123F ACP DISCUSS/DSCN MKR DOCD: CPT | Performed by: ORTHOPAEDIC SURGERY

## 2021-11-04 RX ORDER — BETAMETHASONE SODIUM PHOSPHATE AND BETAMETHASONE ACETATE 3; 3 MG/ML; MG/ML
6 INJECTION, SUSPENSION INTRA-ARTICULAR; INTRALESIONAL; INTRAMUSCULAR; SOFT TISSUE ONCE
Status: COMPLETED | OUTPATIENT
Start: 2021-11-04 | End: 2021-11-04

## 2021-11-04 RX ADMIN — BETAMETHASONE SODIUM PHOSPHATE AND BETAMETHASONE ACETATE 6 MG: 3; 3 INJECTION, SUSPENSION INTRA-ARTICULAR; INTRALESIONAL; INTRAMUSCULAR; SOFT TISSUE at 10:45

## 2021-11-04 SDOH — ECONOMIC STABILITY: FOOD INSECURITY: WITHIN THE PAST 12 MONTHS, YOU WORRIED THAT YOUR FOOD WOULD RUN OUT BEFORE YOU GOT MONEY TO BUY MORE.: NEVER TRUE

## 2021-11-04 SDOH — ECONOMIC STABILITY: FOOD INSECURITY: WITHIN THE PAST 12 MONTHS, THE FOOD YOU BOUGHT JUST DIDN'T LAST AND YOU DIDN'T HAVE MONEY TO GET MORE.: NEVER TRUE

## 2021-11-04 ASSESSMENT — LIFESTYLE VARIABLES
HOW OFTEN DO YOU HAVE A DRINK CONTAINING ALCOHOL: NEVER
HOW OFTEN DO YOU HAVE A DRINK CONTAINING ALCOHOL: 0
AUDIT-C TOTAL SCORE: INCOMPLETE
AUDIT TOTAL SCORE: INCOMPLETE

## 2021-11-04 ASSESSMENT — PATIENT HEALTH QUESTIONNAIRE - PHQ9
2. FEELING DOWN, DEPRESSED OR HOPELESS: 0
SUM OF ALL RESPONSES TO PHQ QUESTIONS 1-9: 0
SUM OF ALL RESPONSES TO PHQ QUESTIONS 1-9: 0
2. FEELING DOWN, DEPRESSED OR HOPELESS: 0
SUM OF ALL RESPONSES TO PHQ QUESTIONS 1-9: 0
SUM OF ALL RESPONSES TO PHQ QUESTIONS 1-9: 0
1. LITTLE INTEREST OR PLEASURE IN DOING THINGS: 0
SUM OF ALL RESPONSES TO PHQ9 QUESTIONS 1 & 2: 0
1. LITTLE INTEREST OR PLEASURE IN DOING THINGS: 0
SUM OF ALL RESPONSES TO PHQ QUESTIONS 1-9: 0
SUM OF ALL RESPONSES TO PHQ9 QUESTIONS 1 & 2: 0
SUM OF ALL RESPONSES TO PHQ QUESTIONS 1-9: 0
DEPRESSION UNABLE TO ASSESS: PT REFUSES

## 2021-11-04 ASSESSMENT — SOCIAL DETERMINANTS OF HEALTH (SDOH): HOW HARD IS IT FOR YOU TO PAY FOR THE VERY BASICS LIKE FOOD, HOUSING, MEDICAL CARE, AND HEATING?: NOT HARD AT ALL

## 2021-11-04 NOTE — PATIENT INSTRUCTIONS
Patient Education        Carpal Tunnel Syndrome: Care Instructions  Overview     Carpal tunnel syndrome is numbness, tingling, weakness, and pain in your hand, wrist, and sometimes forearm. It is caused by pressure on the median nerve. This nerve and several tough tissues called tendons run through a space in the wrist. This space is called the carpal tunnel. The repeated hand motions used in work and some hobbies and sports can put pressure on the median nerve. Pregnancy can cause carpal tunnel syndrome. Several conditions, such as diabetes, arthritis, and an underactive thyroid, can also cause it. You may be able to limit an activity or change the way you do it to reduce your symptoms. You also can take other steps to feel better. If your symptoms are mild, 1 to 2 weeks of home treatment are likely to ease your pain. Surgery is needed only if other treatments do not work. Follow-up care is a key part of your treatment and safety. Be sure to make and go to all appointments, and call your doctor if you are having problems. It's also a good idea to know your test results and keep a list of the medicines you take. How can you care for yourself at home? · If possible, stop or reduce the activity that causes your symptoms. If you cannot stop the activity, take frequent breaks to rest and stretch or change hand positions to do a task. Try switching hands, such as when using a computer mouse. · Try to avoid bending or twisting your wrists. · Ask your doctor if you can take an over-the-counter pain medicine, such as acetaminophen (Tylenol), ibuprofen (Advil, Motrin), or naproxen (Aleve). Be safe with medicines. Read and follow all instructions on the label. · If your doctor prescribes corticosteroid medicine to help reduce pain and swelling, take it exactly as prescribed. Call your doctor if you think you are having a problem with your medicine.   · Put ice or a cold pack on your wrist for 10 to 20 minutes at a time to ease pain. Put a thin cloth between the ice and your skin. · If your doctor or your physical or occupational therapist tells you to wear a wrist splint, wear it as directed to keep your wrist in a neutral position. This also eases pressure on your median nerve. · Ask your doctor whether you should have physical or occupational therapy to learn how to do tasks differently. · Try a yoga class to stretch your muscles and build strength in your hands and wrists. Yoga has been shown to ease carpal tunnel symptoms. To prevent carpal tunnel  · When working at a computer, keep your hands and wrists in line with your forearms. Hold your elbows close to your sides. Take a break every 10 to 15 minutes. · Try these exercises:  ? Warm up: Rotate your wrist up, down, and from side to side. Repeat this 4 times. Stretch your fingers far apart, relax them, then stretch them again. Repeat 4 times. Stretch your thumb by pulling it back gently, holding it, and then releasing it. Repeat 4 times. ? Prayer stretch: Start with your palms together in front of your chest just below your chin. Slowly lower your hands toward your waistline while keeping your hands close to your stomach and your palms together until you feel a mild to moderate stretch under your forearms. Hold for 10 to 20 seconds. Repeat 4 times. ? Wrist flexor stretch: Hold your arm in front of you with your palm up. Bend your wrist, pointing your hand toward the floor. With your other hand, gently bend your wrist further until you feel a mild to moderate stretch in your forearm. Hold for 10 to 20 seconds. Repeat 4 times. ? Wrist extensor stretch: Repeat the steps for the wrist flexor stretch, but begin with your extended hand palm down. · Squeeze a rubber exercise ball several times a day to keep your hands and fingers strong. · Avoid holding objects (such as a book) in one position for a long time. When possible, use your whole hand to grasp an object.  Using just the thumb and index finger can put stress on the wrist.  · Do not smoke. It can make this condition worse by reducing blood flow to the median nerve. If you need help quitting, talk to your doctor about stop-smoking programs and medicines. These can increase your chances of quitting for good. When should you call for help? Watch closely for changes in your health, and be sure to contact your doctor if:    · Your pain or other problems do not get better with home care.     · You want more information about physical or occupational therapy.     · You have side effects of your corticosteroid medicine, such as:  ? Weight gain. ? Mood changes. ? Trouble sleeping. ? Bruising easily.     · You have any other problems with your medicine. Where can you learn more? Go to [email or web address]. org and sign in to your [employer] account. Enter R432 in the [employer]linx box to learn more about \"Carpal Tunnel Syndrome: Care Instructions. \"     If you do not have an account, please click on the \"Sign Up Now\" link. Current as of: July 1, 2021               Content Version: 13.0  © 2649-4736 [employer]. Care instructions adapted under license by [facility] ([facility]). If you have questions about a medical condition or this instruction, always ask your healthcare professional. [place] [address] any warranty or liability for your use of this information. Patient Education        Carpal Tunnel Syndrome: Exercises  Introduction  Here are some examples of exercises for you to try. The exercises may be suggested for a condition or for rehabilitation. Start each exercise slowly. Ease off the exercises if you start to have pain. You will be told when to start these exercises and which ones will work best for you. Warm-up stretches  When you no longer have pain or numbness, you can do exercises to help prevent carpal tunnel syndrome from coming back.  Do not do any stretch or movement that is uncomfortable or painful. 1. Rotate your wrist up, down, and from side to side. Repeat 4 times. 2. Stretch your fingers far apart. Relax them, and then stretch them again. Repeat 4 times. 3. Stretch your thumb by pulling it back gently, holding it, and then releasing it. Repeat 4 times. How to do the exercises  Prayer stretch    1. Start with your palms together in front of your chest just below your chin. 2. Slowly lower your hands toward your waistline, keeping your hands close to your stomach and your palms together until you feel a mild to moderate stretch under your forearms. 3. Hold for at least 15 to 30 seconds. Repeat 2 to 4 times. Wrist flexor stretch    1. Extend your arm in front of you with your palm up. 2. Bend your wrist, pointing your hand toward the floor. 3. With your other hand, gently bend your wrist farther until you feel a mild to moderate stretch in your forearm. 4. Hold for at least 15 to 30 seconds. Repeat 2 to 4 times. Wrist extensor stretch    1. Repeat steps 1 through 4 of the stretch above, but begin with your extended hand palm down. Follow-up care is a key part of your treatment and safety. Be sure to make and go to all appointments, and call your doctor if you are having problems. It's also a good idea to know your test results and keep a list of the medicines you take. Where can you learn more? Go to https://[employer]. org and sign in to your [employer] account. Enter Q043 in the [facility] box to learn more about \"Carpal Tunnel Syndrome: Exercises. \"     If you do not have an account, please click on the \"Sign Up Now\" link. Current as of: July 1, 2021               Content Version: 13.0  © 6855-7768 [employer]. Care instructions adapted under license by [facility] ([facility]).  If you have questions about a medical condition or this instruction, always ask your healthcare professional. [address] any warranty or liability for your use of this information.

## 2021-11-04 NOTE — PROGRESS NOTES
Department of Orthopedic Surgery  History and Physical      CHIEF COMPLAINT:  Right wrist pain    HISTORY OF PRESENT ILLNESS:                The patient is a 77 y.o. male who presents with right wrist pain. Patient at times inconsistent with history, caretaker in room and helped with providing history. Pt states he has had multiple falls since July and has had ongoing wrist pain since. Pain is mostly located to the dorsal aspect of the thumb and wrist. He does not some occasional numbness and tingling into the hand. Pain is made worse with extension of the wrist and supporting his weight on his walker. Denies any prior injury to the wrist.     Past Medical History:        Diagnosis Date    Chronic kidney disease (CKD), stage III (moderate) (HCC)     Diabetes mellitus ([address]     DJD (degenerative joint disease)     Facies suggestive of Down syndrome     Hearing impaired     Hyperlipidemia     Hypertension     Hypothyroid     Mental deficiency     Urge urinary incontinence      Past Surgical History:        Procedure Laterality Date    COLONOSCOPY      COLONOSCOPY N/A 8/11/2021    COLONOSCOPY DIAGNOSTIC performed by Herman Lucas DO at [facility] ENDOSCOPY     Current Medications:   No current facility-administered medications for this visit. Allergies:  Patient has no known allergies. Social History:   TOBACCO:   reports that he has never smoked. He has never used smokeless tobacco.  ETOH:   reports no history of alcohol use. DRUGS:   reports no history of drug use. ACTIVITIES OF DAILY LIVING:    OCCUPATION:    Family History:   History reviewed. No pertinent family history.     REVIEW OF SYSTEMS:  CONSTITUTIONAL:  negative  HEENT:  negative  RESPIRATORY:  negative  CARDIOVASCULAR:  negative  MUSCULOSKELETAL:  Right wrist pain  NEUROLOGICAL:  negative  BEHAVIOR/PSYCH:  negative    PHYSICAL EXAM:    VITALS:  Resp 20   Ht 5' 5\" (1.651 m)   Wt 233 lb 3.2 oz (105.8 kg)   BMI 38.81 kg/m²   CONSTITUTIONAL: awake, alert, cooperative, no apparent distress, and appears stated age  EYES:  sclera clear, conjunctiva normal  ENT:  Normocephalic, without obvious abnormality, atraumatic  NECK:  Supple, symmetrical, trachea midline  LUNGS:  No increased effort of breathing  CARDIOVASCULAR:  2+ radial pulses, extremities warm and well perfused  ABDOMEN:  Nondistended   CHEST:  Atraumatic   GENITAL/URINARY:  deferred  NEUROLOGIC:  Awake, alert, oriented to name, place and time. Cranial nerves II-XII are grossly intact. Motor is 5 out of 5 bilaterally. Sensory is intact.  gait is normal.  MUSCULOSKELETAL:    RUE: Chronic ulnar deviated deformity to the wrist. Wrist extension to 15, flexion to 45, radial deviation 0, ulnar deviation 10. TTP about the dorsal lunate bossing. Pain with axial load to thumb CMC. Positive Tinel's and micah's. Sensation intact to entire hand. AIN, PIN and ulnar motor function intact to hand. 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 No acute fractures or dislocations noted  Impression: Severe right wrist arthritis     IMPRESSION:  · Right wrist arthritis  · Chronic kidney disease  · Mental deficiency  · Diabetes  ·     PLAN:  Today we will provide him with a cortisone injection to his wrist as well a wrist brace to be worn for comfort. In addition, we will refer him for nerve testing. Return to office once testing is complete, in approximately 4-6 weeks.      Procedure Note Wrist Cortisone Injection    The right wrist was identified as the injection site. The risk and benefits of a cortisone injection were explained and the patient consented to the injection. Under sterile conditions, the wrist was injected with a mixture of 1 mL of 1% Lidocaine and 1 mL of 6 mg/mL Betamethasone without complication. A sterile bandage was applied. I have seen and evaluated the patient and agree with the above assessment and plan on today's visit. I have performed the key components of the history and physical examination with significant findings of right wrist arthritis and carplal tunnel syndrome. Injection provided and EMG/NCS ordered. Wrist brace fitted and provided. I concur with the findings and plan as documented.     Juan Gonzales MD  11/4/2021

## 2021-11-04 NOTE — PROGRESS NOTES
Medicare Annual Wellness Visit  Name: Rubi Banegas Date: 2021   MRN: [id number] Sex: Male   Age: 77 y.o. Ethnicity: Non- / Non    : 1945 Race: White (non-)      Jono Null is here for Medicare AWV (awv )    Screenings for behavioral, psychosocial and functional/safety risks, and cognitive dysfunction are all negative except as indicated below. These results, as well as other patient data from the [address] form, are documented in Flowsheets linked to this Encounter. No Known Allergies      Prior to Visit Medications    Medication Sig Taking? Authorizing Provider   traMADol (ULTRAM) 50 MG tablet Take 1 tablet by mouth 3 times daily for 93 days. Yes DARREN Parry CNP   fluticasone (FLONASE) 50 MCG/ACT nasal spray 1 spray by Each Nostril route daily Yes Historical Provider, MD   OZEMPIC, 0.25 OR 0.5 MG/DOSE, 2 MG/1.5ML SOPN Inject 0.5 mg into the skin once a week  Yes Historical Provider, MD   betamethasone dipropionate (DIPROLENE) 0.05 % cream Apply topically 2 times daily.  PRN for 14 days and then take 7 day break Yes DARREN Parry CNP   Azelastine HCl 137 MCG/SPRAY SOLN 2 sprays by Nasal route daily In each nostril Yes Historical Provider, MD   atorvastatin (LIPITOR) 20 MG tablet Take 20 mg by mouth daily Yes Historical Provider, MD   levothyroxine (SYNTHROID) 100 MCG tablet Take 100 mcg by mouth Daily Yes Historical Provider, MD   acetaminophen (TYLENOL) 325 MG tablet Take 325 mg by mouth 3 times daily  Yes Historical Provider, MD   amLODIPine (NORVASC) 10 MG tablet Take 10 mg by mouth daily Yes Historical Provider, MD   enalapril (VASOTEC) 10 MG tablet Take 10 mg by mouth daily Yes Historical Provider, MD   allopurinol (ZYLOPRIM) 100 MG tablet Take 100 mg by mouth daily Yes Historical Provider, MD   FLUoxetine (PROZAC) 40 MG capsule Take 40 mg by mouth daily Take with 20 mg capsuleto equal 60 mg daily Yes Historical Provider, MD oxybutynin (DITROPAN-XL) 5 MG extended release tablet Take 5 mg by mouth nightly Yes Historical Provider, MD   docusate sodium (COLACE) 100 MG capsule Take 100 mg by mouth 1 CAPSULE EVERY MORNING AND 2 CAPSULES NIGHTLY Yes Historical Provider, MD   Aspirin Buf,RyJbp-VqQxu-SlZzq, (BUFFERED ASPIRIN) 325 MG TABS Take 1 tablet by mouth daily Yes Historical Provider, MD   FLUoxetine (PROZAC) 20 MG capsule Take 20 mg by mouth daily Take with 40 mg capsule to equal 60mg daily Yes Historical Provider, MD   diclofenac sodium (VOLTAREN) 1 % GEL Apply 4 g topically 4 times daily Yes Historical Provider, MD   menthol-zinc oxide (CALMOSEPTINE) 0.44-20.625 % OINT ointment Apply 1 applicator topically 2 times daily Prn. Max 30 ml per day. Yes Historical Provider, MD   zoster recombinant adjuvanted vaccine (SHINGRIX) 50 MCG/0.5ML SUSR injection Inject 0.5 mLs into the muscle See Admin Instructions 1 dose now and repeat in 2-6 months  DARREN Cardenas CNP         Past Medical History:   Diagnosis Date    Chronic kidney disease (CKD), stage III (moderate) ([address]     Diabetes mellitus ([address]     DJD (degenerative joint disease)     Facies suggestive of Down syndrome     Hearing impaired     Hyperlipidemia     Hypertension     Hypothyroid     Mental deficiency     Urge urinary incontinence        Past Surgical History:   Procedure Laterality Date    COLONOSCOPY      COLONOSCOPY N/A 8/11/2021    COLONOSCOPY DIAGNOSTIC performed by Danita Montes DO at [facility] ENDOSCOPY       History reviewed. No pertinent family history.     CareTeam (Including outside providers/suppliers regularly involved in providing care):   Patient Care Team:  DARREN Cardneas CNP as PCP - General (Nurse Practitioner)  DARREN Cardenas CNP as PCP - [facility] Empaneled Provider    Wt Readings from Last 3 Encounters:   11/04/21 234 lb (106.1 kg)   11/04/21 233 lb 3.2 oz (105.8 kg)   09/16/21 256 lb 8 oz (116.3 kg)     Vitals:    11/04/21 1059   BP: 128/80   Pulse: 100   Resp: 20   Temp: 97.9 °F (36.6 °C)   TempSrc: Temporal   SpO2: 98%   Weight: 234 lb (106.1 kg)   Height: 5' 5\" (1.651 m)     Body mass index is 38.94 kg/m². Based upon direct observation of the patient, evaluation of cognition reveals recent and remote memory intact. General Appearance: alert and oriented to person, place and time, well developed and well- nourished, in no acute distress  Skin: warm and dry, no rash or erythema  Head: normocephalic and atraumatic  Eyes: pupils equal, round, and reactive to light, extraocular eye movements intact, conjunctivae normal  ENT: tympanic membrane, external ear and ear canal normal bilaterally, nose without deformity, nasal mucosa and turbinates normal without polyps  Neck: supple and non-tender without mass, no thyromegaly or thyroid nodules, no cervical lymphadenopathy  Pulmonary/Chest: clear to auscultation bilaterally- no wheezes, rales or rhonchi, normal air movement, no respiratory distress  Cardiovascular: normal rate, regular rhythm, normal S1 and S2, no murmurs, rubs, clicks, or gallops, distal pulses intact, no carotid bruits  Abdomen: soft, non-tender, non-distended, normal bowel sounds, no masses or organomegaly  Extremities: no cyanosis, clubbing or edema  Musculoskeletal: normal range of motion, no joint swelling, deformity or tenderness  Neurologic: reflexes normal and symmetric, no cranial nerve deficit, gait, coordination and speech normal    Patient's complete Health Risk Assessment and screening values have been reviewed and are found in Flowsheets. The following problems were reviewed today and where indicated follow up appointments were made and/or referrals ordered.     Positive Risk Factor Screenings with Interventions:     Fall Risk:  2 or more falls in past year?: (!) yes  Fall with injury in past year?: no  Fall Risk Interventions:    · Home safety tips provided  · Home exercises provided to promote strength and balance     Depression:  Depression Unable to Assess: Pt refuses  PHQ-2 Score: 0  PHQ-9 Total Score: 0    Severity:1-4 = minimal depression, 5-9 = mild depression, 10-14 = moderate depression, 15-19 = moderately severe depression, 20-27 = severe depression        General Health and ACP:  General  In general, how would you say your health is?: Good  In the past 7 days, have you experienced any of the following?  New or Increased Pain, New or Increased Fatigue, Loneliness, Social Isolation, Stress or Anger?: (!) New or Increased Pain  Do you get the social and emotional support that you need?: Yes  Do you have a Living Will?: (!) No  Advance Directives     Power of [facility] Will ACP-Advance Directive ACP-Power of     Not on File Not on File Not on File Not on File      General Health Risk Interventions:  · No Living Will: Advance Care Planning addressed with patient today    Health Habits/Nutrition:  Health Habits/Nutrition  Do you exercise for at least 20 minutes 2-3 times per week?: (!) No  Have you lost any weight without trying in the past 3 months?: No  Do you eat only one meal per day?: No  Have you seen the dentist within the past year?: Yes  Body mass index: (!) 38.94  Health Habits/Nutrition Interventions:  · Inadequate physical activity:  educational materials provided to promote increased physical activity    Hearing/Vision:  No exam data present  Hearing/Vision  Do you or your family notice any trouble with your hearing that hasn't been managed with hearing aids?: (!) Yes  Do you have difficulty driving, watching TV, or doing any of your daily activities because of your eyesight?: No  Have you had an eye exam within the past year?: Appointment is scheduled  Hearing/Vision Interventions:  · Hearing concerns:  patient declines any further evaluation/treatment for hearing issues     ADL:  ADLs  In the past 7 days, did you need help from others to perform any of the following everyday activities? Eating, dressing, grooming, bathing, toileting, or walking/balance?: (!) Bathing, Walking/Balance  In the past 7 days, did you need help from others to take care of any of the following?  Laundry, housekeeping, banking/finances, shopping, telephone use, food preparation, transportation, or taking medications?: (!) Laundry, Housekeeping, Banking/Finances, Shopping, Food Preparation, Transportation, Taking Medications  ADL Interventions:  · Patient declines any further evaluation/treatment for this issue    Personalized Preventive Plan   Current Health Maintenance Status  Immunization History   Administered Date(s) Administered    COVID-19, Leanndemetrius Neighbor, Primary or Immunocompromised, PF, 100mcg/0.5mL 01/06/2021, 02/03/2021    Hepatitis B Adult (Engerix-B) 06/27/2016    Hepatitis B Adult (Recombivax HB) 06/27/2016    Influenza A (H6H0-21) Vaccine PF IM 12/08/2009    Influenza Virus Vaccine 11/22/2017, 09/28/2019, 09/28/2019    Influenza, High Dose (Fluzone 65 yrs and older) 03/27/2019    Pneumococcal Conjugate 13-valent ([id number]) 07/06/2016    Pneumococcal Polysaccharide ([id number]) 07/26/2017    Zoster Live (Zostavax) 08/27/2016        Health Maintenance   Topic Date Due    Diabetic retinal exam  Never done    DTaP/Tdap/Td vaccine (1 - Tdap) Never done    Shingles Vaccine (2 of 3) 10/22/2016    Annual Wellness Visit (AWV)  Never done    Flu vaccine (1) 09/01/2021    Diabetic foot exam  05/13/2022    A1C test (Diabetic or Prediabetic)  09/14/2022    Lipid screen  09/14/2022    TSH testing  09/14/2022    Potassium monitoring  09/14/2022    Creatinine monitoring  09/14/2022    Colon cancer screen colonoscopy  08/11/2024    Pneumococcal 65+ yrs at Risk Vaccine  Completed    COVID-19 Vaccine  Completed    Hepatitis C screen  Completed    Hepatitis A vaccine  Aged Out    Hib vaccine  Aged Out    Meningococcal (ACWY) vaccine  Aged Out     Recommendations for [employer] Due: see orders and patient instructions/AVS.  . Recommended screening schedule for the next 5-10 years is provided to the patient in written form: see Patient Instructions/AVS.    Milo Bae was seen today for medicare awv. Diagnoses and all orders for this visit:    Routine general medical examination at a health care facility    Type 2 diabetes mellitus with stage 3b chronic kidney disease, without long-term current use of insulin (HCC)    Stage 3b chronic kidney disease ([facility]    Vitamin D deficiency    Other specified hypothyroidism    Primary osteoarthritis involving multiple joints    Essential hypertension, malignant    Hyperlipidemia, unspecified hyperlipidemia type    Cardiac murmur, unspecified    Facies suggestive of Down syndrome    Mental deficiency    Unspecified abnormalities of gait and mobility    At high risk for falls    BMI 38.0-38.9,adult                 Cardiovascular Disease Risk Counseling: Assessed the patient's risk to develop cardiovascular disease and reviewed main risk factors. Reviewed steps to reduce disease risk including:   · Quitting tobacco use, reducing amount smoked, or not starting the habit  · Making healthy food choices  · Being physically active and gradualy increasing activity levels   · Reduce weight and determine a healthy BMI goal  · Monitor blood pressure and treat if higher than 140/90 mmHg  · Maintain blood total cholesterol levels under 5 mmol/l or 190 mg/dl  · Maintain LDL cholesterol levels under 3.0 mmol/l or 115 mg/dl   · Control blood glucose levels  · Consider taking aspirin (75 mg daily), once blood pressure is controlled   Provided a follow up plan.   Time spent (minutes): 12      Electronically signed by DARREN Townsend CNP on 11/4/2021 at 1:24 PM

## 2021-11-04 NOTE — PATIENT INSTRUCTIONS
Personalized Preventive Plan for Erskin Epley - 11/4/2021  Medicare offers a range of preventive health benefits. Some of the tests and screenings are paid in full while other may be subject to a deductible, co-insurance, and/or copay. Some of these benefits include a comprehensive review of your medical history including lifestyle, illnesses that may run in your family, and various assessments and screenings as appropriate. After reviewing your medical record and screening and assessments performed today your provider may have ordered immunizations, labs, imaging, and/or referrals for you. A list of these orders (if applicable) as well as your Preventive Care list are included within your After Visit Summary for your review. Other Preventive Recommendations:    · A preventive eye exam performed by an eye specialist is recommended every 1-2 years to screen for glaucoma; cataracts, macular degeneration, and other eye disorders. · A preventive dental visit is recommended every 6 months. · Try to get at least 150 minutes of exercise per week or 10,000 steps per day on a pedometer . · Order or download the FREE \"Exercise & Physical Activity: Your Everyday Guide\" from [employer]. Call 6-352.794.8394 or search [employer] online. · You need 1426-0531 mg of calcium and 8431-7568 IU of vitamin D per day. It is possible to meet your calcium requirement with diet alone, but a vitamin D supplement is usually necessary to meet this goal.  · When exposed to the sun, use a sunscreen that protects against both UVA and UVB radiation with an SPF of 30 or greater. Reapply every 2 to 3 hours or after sweating, drying off with a towel, or swimming. · Always wear a seat belt when traveling in a car. Always wear a helmet when riding a bicycle or motorcycle.

## 2021-11-16 DIAGNOSIS — M15.9 PRIMARY OSTEOARTHRITIS INVOLVING MULTIPLE JOINTS: ICD-10-CM

## 2021-11-16 RX ORDER — TRAMADOL HYDROCHLORIDE 50 MG/1
50 TABLET ORAL 3 TIMES DAILY
Qty: 93 TABLET | Refills: 2 | Status: SHIPPED
Start: 2021-11-16 | End: 2022-02-11 | Stop reason: SDUPTHER

## 2021-11-16 NOTE — TELEPHONE ENCOUNTER
Patients last appointment 11/4/2021. Patients next scheduled appointment   Future Appointments   Date Time Provider Rhonda Sotomayor   12/16/2021 10:50 AM Shalom Whitfield MD BD ORTHO [facility]   3/10/2022  8:30 AM SCHEDULE, YOMAIRA STONE [facility]   3/17/2022 10:00 AM DARREN Lebron CNP [facility]

## 2021-11-23 LAB — DIABETIC RETINOPATHY: NEGATIVE

## 2021-12-14 ENCOUNTER — OFFICE VISIT (OUTPATIENT)
Dept: NEUROLOGY | Age: 76
End: 2021-12-14
Payer: MEDICARE

## 2021-12-14 VITALS
OXYGEN SATURATION: 97 % | TEMPERATURE: 98.2 F | DIASTOLIC BLOOD PRESSURE: 82 MMHG | HEART RATE: 98 BPM | RESPIRATION RATE: 18 BRPM | SYSTOLIC BLOOD PRESSURE: 137 MMHG

## 2021-12-14 DIAGNOSIS — G56.01 CARPAL TUNNEL SYNDROME OF RIGHT WRIST: ICD-10-CM

## 2021-12-14 PROCEDURE — 95910 NRV CNDJ TEST 7-8 STUDIES: CPT | Performed by: PSYCHIATRY & NEUROLOGY

## 2021-12-14 PROCEDURE — 95886 MUSC TEST DONE W/N TEST COMP: CPT | Performed by: PSYCHIATRY & NEUROLOGY

## 2021-12-14 NOTE — PROGRESS NOTES
[facility]  Electrodiagnostic Laboratory  *Accredited by the [address] [place] [address] with exemplary status  [address] [facility]  Phone: [phone]  Fax: [phone]    Referring Provider: Kris Amezquita MD  Primary Care Physician: DARREN Owen CNP  Patient Name: Dirk Thorpe  Patient YOB: 1945  Gender: male  BMI: There is no height or weight on file to calculate BMI. Blood pressure 137/82, pulse 98, temperature 98.2 °F (36.8 °C), resp. rate 18, SpO2 97 %. 12/14/2021    Description of clinical problem: The patient is coming in with pain numbness and tingling in the right hand. Chief Complaint   Patient presents with    Other     EMG     Pain Yes   ; Numbness/tingling  Yes; Weakness  Yes       Brief physical exam:   Sensory deficit Yes; Weakness Yes; Atrophy  Yes; Reflex abnormality Yes  Motor NCS      Nerve / Sites Latency Amplitude Amp. 1-2 Distance Lat Diff Velocity Temp.    ms mV % cm ms m/s °C   R Median - APB      Wrist 5.99 3.8 100 8   32.2      Elbow 11.61 3.3 88.3 21 5.63 37 32.2   R Ulnar - ADM      Wrist 2.60 2.7 100 8   33      B. Elbow 6.98 1.9 69.5 19 4.38 43 32.9      A. Elbow 10.36 1.9 69 10 3.39 30 32.9   R Ulnar - FDI      Wrist 3.54 3.6 100    33.4      B. Elbow 8.75 2.9 81.4 19 5.21 36 33.3      A. Elbow 12.55 2.8 79.3 10 3.80 26 33.1       Sensory NCS      Nerve / Sites Onset Lat Peak Lat PP Amp Amp. 1-2 Distance Velocity Temp.    ms ms µV % cm m/s °C   R Median - Digit II (Antidromic)      Mid Palm 1.93 2.50 8.2 100 7 36 32.7      Wrist 4.01 4.84 8.2 146 14 35 32.7   R Ulnar - Digit V (Antidromic)      Wrist 3.28 4.06 4.4 100 14 43 32.7   R Radial - Anatomical  (Forearm)      Forearm 2.24 2.76 6.7 100 10 45 32.7   R Dorsal ulnar cutaneous - Hand dorsum (Forearm)      Forearm 2.34 2.92 7.9 100 8 34 33.4     F  Wave      Nerve F Lat M Lat F-M Lat    ms ms ms   R Median - APB 32.0 6.2 25.8   R Ulnar - ADM 25.4 3.1 22.2       EMG         EMG Summary Table     Spontaneous MUAP Recruitment   Muscle IA Fib PSW Fasc H.F. Amp Dur. PPP Pattern   R. First dorsal interosseous Normal None None None None Normal Decr Few Decr   R. Abductor pollicis brevis Normal None None None None Normal Normal None Normal   R. Abductor digiti minimi (manus) Normal None None None None Normal Normal None Normal   R. Flexor digitorum profundus, dig 4 & 5 Normal None None None None Normal Normal None Normal   R. Pronator teres Normal None None None None Normal Normal None Normal   R. Biceps brachii Normal None None None None Normal Normal None Normal   R. Triceps brachii Normal None None None None Normal Normal None Decr   R. Deltoid Normal None None None None Normal Normal None Normal       Study Limitations:  None    Summary of Findings:   Nerve conduction studies:   · The following nerve conduction studies were abnormal:   · The right median motor nerve revealed prolonged distal latency with reduced conduction velocity and reduced amplitude. · The right ulnar motor nerve revealed reduced amplitude with reduced conduction velocity. However there was no slowing noted across the elbow. · The right median sensory nerve revealed prolonged peak latency with reduced conduction velocity. · The right ulnar sensory nerve revealed mildly reduced velocity. · All other nerve conduction studies listed in the table above were normal in latency, amplitude and conduction velocity. Needle EMG:   · Needle EMG was performed using a concentric needle. · The following abnormalities were seen on needle EMG:   · Reduced recruitment was seen in the right FDI and triceps muscles.  All other muscles tested, as listed in the table above demonstrated normal amplitude, duration, phases and recruitment and no active denervation signs were seen. Diagnostic Interpretation:      This study was abnormal.     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 Chronic neurogenic changes were noted in the right C7-C8 myotomes indicative of an underlying chronic radiculopathy. 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 Previous Study: NA      Follow up EMG is recommended if clinically indicated. Technologist: SC     Physician: Meron Ivy MD    Nerve conduction studies and electromyography were performed according to our laboratory policies and procedures which can be provided upon request. All abnormal values are identified in the table.  Laboratory normal values can also be provided upon request.       Cc: MD Umberto Dietrich, APRN - CNP

## 2021-12-16 ENCOUNTER — OFFICE VISIT (OUTPATIENT)
Dept: ORTHOPEDIC SURGERY | Age: 76
End: 2021-12-16
Payer: MEDICARE

## 2021-12-16 VITALS — BODY MASS INDEX: 38.99 KG/M2 | RESPIRATION RATE: 20 BRPM | HEIGHT: 65 IN | WEIGHT: 234 LBS

## 2021-12-16 DIAGNOSIS — G56.01 CARPAL TUNNEL SYNDROME OF RIGHT WRIST: Primary | ICD-10-CM

## 2021-12-16 PROCEDURE — 1036F TOBACCO NON-USER: CPT | Performed by: ORTHOPAEDIC SURGERY

## 2021-12-16 PROCEDURE — 20526 THER INJECTION CARP TUNNEL: CPT | Performed by: ORTHOPAEDIC SURGERY

## 2021-12-16 PROCEDURE — G8484 FLU IMMUNIZE NO ADMIN: HCPCS | Performed by: ORTHOPAEDIC SURGERY

## 2021-12-16 PROCEDURE — G8417 CALC BMI ABV UP PARAM F/U: HCPCS | Performed by: ORTHOPAEDIC SURGERY

## 2021-12-16 PROCEDURE — 1123F ACP DISCUSS/DSCN MKR DOCD: CPT | Performed by: ORTHOPAEDIC SURGERY

## 2021-12-16 PROCEDURE — 4040F PNEUMOC VAC/ADMIN/RCVD: CPT | Performed by: ORTHOPAEDIC SURGERY

## 2021-12-16 PROCEDURE — 3017F COLORECTAL CA SCREEN DOC REV: CPT | Performed by: ORTHOPAEDIC SURGERY

## 2021-12-16 PROCEDURE — G8427 DOCREV CUR MEDS BY ELIG CLIN: HCPCS | Performed by: ORTHOPAEDIC SURGERY

## 2021-12-16 PROCEDURE — 99212 OFFICE O/P EST SF 10 MIN: CPT | Performed by: ORTHOPAEDIC SURGERY

## 2021-12-16 RX ORDER — BETAMETHASONE SODIUM PHOSPHATE AND BETAMETHASONE ACETATE 3; 3 MG/ML; MG/ML
6 INJECTION, SUSPENSION INTRA-ARTICULAR; INTRALESIONAL; INTRAMUSCULAR; SOFT TISSUE ONCE
Status: COMPLETED | OUTPATIENT
Start: 2021-12-16 | End: 2021-12-16

## 2021-12-16 RX ADMIN — BETAMETHASONE SODIUM PHOSPHATE AND BETAMETHASONE ACETATE 6 MG: 3; 3 INJECTION, SUSPENSION INTRA-ARTICULAR; INTRALESIONAL; INTRAMUSCULAR; SOFT TISSUE at 17:15

## 2021-12-16 NOTE — PROGRESS NOTES
Department of Orthopedic Surgery  History and Physical      CHIEF COMPLAINT:  EMG follow up    HISTORY OF PRESENT ILLNESS:                The patient is a 77 y.o. male who presents with right wrist pain. Patient at times inconsistent with history, caretaker in room and helped with providing history. Pt states he has had multiple falls since July and has had ongoing wrist pain since. Pain is mostly located to the dorsal aspect of the thumb and wrist. He does not some occasional numbness and tingling into the hand. Pain is made worse with extension of the wrist and supporting his weight on his walker. Denies any prior injury to the wrist.     6 weeks ago the patient had a cortisone injection to the right wrist.  Patient states this did help some of his symptoms to the right wrist.  He still reports numbness and tingling to his right upper extremity. Patient had an EMG/NCS dated 12/14/21    Right median nerve motor latency: 5.99  Right ulnar nerve velocity: 43 below the elbow 30 above the elbow  Right median nerve sensory latency: 4.84    EMG portion of the exam demonstrates decreased recruitment pattern in the right triceps brachii. This is consistent with moderate right carpal tunnel syndrome along with chronic right C7 and C8 radiculopathy and sensory motor polyneuropathy      Past Medical History:        Diagnosis Date    Chronic kidney disease (CKD), stage III (moderate) (HCC)     Diabetes mellitus ([address]     DJD (degenerative joint disease)     Facies suggestive of Down syndrome     Hearing impaired     Hyperlipidemia     Hypertension     Hypothyroid     Mental deficiency     Urge urinary incontinence      Past Surgical History:        Procedure Laterality Date    COLONOSCOPY      COLONOSCOPY N/A 8/11/2021    COLONOSCOPY DIAGNOSTIC performed by Pascual Mayer DO at [facility] ENDOSCOPY     Current Medications:   No current facility-administered medications for this visit.   Allergies:  Patient has no known allergies. Social History:   TOBACCO:   reports that he has never smoked. He has never used smokeless tobacco.  ETOH:   reports no history of alcohol use. DRUGS:   reports no history of drug use. ACTIVITIES OF DAILY LIVING:    OCCUPATION:    Family History:   No family history on file. REVIEW OF SYSTEMS:  CONSTITUTIONAL:  negative  HEENT:  negative  RESPIRATORY:  negative  CARDIOVASCULAR:  negative  MUSCULOSKELETAL:  Right wrist pain  NEUROLOGICAL:  negative  BEHAVIOR/PSYCH:  negative    PHYSICAL EXAM:    VITALS:  Resp 20   Ht 5' 5\" (1.651 m)   Wt 234 lb (106.1 kg)   BMI 38.94 kg/m²   CONSTITUTIONAL:  awake, alert, cooperative, no apparent distress, and appears stated age  EYES:  sclera clear, conjunctiva normal  ENT:  Normocephalic, without obvious abnormality, atraumatic  NECK:  Supple, symmetrical, trachea midline  LUNGS:  No increased effort of breathing  CARDIOVASCULAR:  2+ radial pulses, extremities warm and well perfused  ABDOMEN:  Nondistended   CHEST:  Atraumatic   GENITAL/URINARY:  deferred  NEUROLOGIC:  Awake, alert, oriented to name, place and time. Cranial nerves II-XII are grossly intact. Motor is 5 out of 5 bilaterally. Sensory is intact.  gait is normal.  MUSCULOSKELETAL:    RUE: Chronic ulnar deviated deformity to the wrist. Wrist extension to 15, flexion to 45, radial deviation 0, ulnar deviation 10. TTP about the dorsal lunate bossing. Pain with axial load to thumb CMC. Positive Tinel's and micah's. Sensation intact to entire hand. AIN, PIN and ulnar motor function intact to hand.      DATA:    CBC:   Lab Results   Component Value Date    WBC 8.7 09/14/2021    RBC 4.41 09/14/2021    HGB 14.2 09/14/2021    HCT 45.4 09/14/2021    .9 09/14/2021    MCH 32.2 09/14/2021    MCHC 31.3 09/14/2021    RDW 14.2 09/14/2021     09/14/2021    MPV 9.7 09/14/2021     PT/INR:  No results found for: PROTIME, INR      IMPRESSION:  · Right wrist arthritis  · Right carpal tunnel

## 2022-01-27 ENCOUNTER — VIRTUAL VISIT (OUTPATIENT)
Dept: FAMILY MEDICINE CLINIC | Age: 77
End: 2022-01-27
Payer: MEDICARE

## 2022-01-27 DIAGNOSIS — R26.9 UNSPECIFIED ABNORMALITIES OF GAIT AND MOBILITY: ICD-10-CM

## 2022-01-27 DIAGNOSIS — F79 MENTAL DEFICIENCY: ICD-10-CM

## 2022-01-27 DIAGNOSIS — E78.5 HYPERLIPIDEMIA, UNSPECIFIED HYPERLIPIDEMIA TYPE: ICD-10-CM

## 2022-01-27 DIAGNOSIS — E11.22 TYPE 2 DIABETES MELLITUS WITH STAGE 3B CHRONIC KIDNEY DISEASE, WITHOUT LONG-TERM CURRENT USE OF INSULIN (HCC): ICD-10-CM

## 2022-01-27 DIAGNOSIS — E55.9 VITAMIN D DEFICIENCY: ICD-10-CM

## 2022-01-27 DIAGNOSIS — Z86.010 HISTORY OF COLON POLYPS: ICD-10-CM

## 2022-01-27 DIAGNOSIS — I10 ESSENTIAL HYPERTENSION, MALIGNANT: ICD-10-CM

## 2022-01-27 DIAGNOSIS — Q90.9: ICD-10-CM

## 2022-01-27 DIAGNOSIS — R01.1 CARDIAC MURMUR, UNSPECIFIED: ICD-10-CM

## 2022-01-27 DIAGNOSIS — M15.9 PRIMARY OSTEOARTHRITIS INVOLVING MULTIPLE JOINTS: Primary | ICD-10-CM

## 2022-01-27 DIAGNOSIS — Z91.81 AT HIGH RISK FOR FALLS: ICD-10-CM

## 2022-01-27 DIAGNOSIS — N18.32 TYPE 2 DIABETES MELLITUS WITH STAGE 3B CHRONIC KIDNEY DISEASE, WITHOUT LONG-TERM CURRENT USE OF INSULIN (HCC): ICD-10-CM

## 2022-01-27 PROBLEM — E11.51 TYPE 2 DIABETES MELLITUS WITH PERIPHERAL ANGIOPATHY (HCC): Status: ACTIVE | Noted: 2022-01-27

## 2022-01-27 PROCEDURE — 2022F DILAT RTA XM EVC RTNOPTHY: CPT | Performed by: NURSE PRACTITIONER

## 2022-01-27 PROCEDURE — G8417 CALC BMI ABV UP PARAM F/U: HCPCS | Performed by: NURSE PRACTITIONER

## 2022-01-27 PROCEDURE — G8427 DOCREV CUR MEDS BY ELIG CLIN: HCPCS | Performed by: NURSE PRACTITIONER

## 2022-01-27 PROCEDURE — 1123F ACP DISCUSS/DSCN MKR DOCD: CPT | Performed by: NURSE PRACTITIONER

## 2022-01-27 PROCEDURE — 4040F PNEUMOC VAC/ADMIN/RCVD: CPT | Performed by: NURSE PRACTITIONER

## 2022-01-27 PROCEDURE — G8484 FLU IMMUNIZE NO ADMIN: HCPCS | Performed by: NURSE PRACTITIONER

## 2022-01-27 PROCEDURE — 99214 OFFICE O/P EST MOD 30 MIN: CPT | Performed by: NURSE PRACTITIONER

## 2022-01-27 PROCEDURE — 3046F HEMOGLOBIN A1C LEVEL >9.0%: CPT | Performed by: NURSE PRACTITIONER

## 2022-01-27 PROCEDURE — 3017F COLORECTAL CA SCREEN DOC REV: CPT | Performed by: NURSE PRACTITIONER

## 2022-01-27 PROCEDURE — 1036F TOBACCO NON-USER: CPT | Performed by: NURSE PRACTITIONER

## 2022-01-27 ASSESSMENT — ENCOUNTER SYMPTOMS
WHEEZING: 0
CONSTIPATION: 0
NAUSEA: 0
BACK PAIN: 0
VOICE CHANGE: 0
BLOOD IN STOOL: 0
VOMITING: 0
DIARRHEA: 0
SHORTNESS OF BREATH: 0
CHEST TIGHTNESS: 0
ABDOMINAL PAIN: 0
TROUBLE SWALLOWING: 0
COUGH: 0

## 2022-01-27 NOTE — PATIENT INSTRUCTIONS
Patient Education        Diabetes Foot Health: Care Instructions  Your Care Instructions     When you have diabetes, your feet need extra care and attention. Diabetes can damage the nerve endings and blood vessels in your feet, making you less likely to notice when your feet are injured. Diabetes also limits your body's ability to fight infection and get blood to areas that need it. If you get a minor foot injury, it could become an ulcer or a serious infection. With good foot care, you can prevent most of these problems. Caring for your feet can be quick and easy. Most of the care can be done when you are bathing or getting ready for bed. Follow-up care is a key part of your treatment and safety. Be sure to make and go to all appointments, and call your doctor if you are having problems. It's also a good idea to know your test results and keep a list of the medicines you take. How can you care for yourself at home? · Keep your blood sugar close to normal by watching what and how much you eat, monitoring blood sugar, taking medicines if prescribed, and getting regular exercise. · Do not smoke. Smoking affects blood flow and can make foot problems worse. If you need help quitting, talk to your doctor about stop-smoking programs and medicines. These can increase your chances of quitting for good. · Eat a diet that is low in fats. High fat intake can cause fat to build up in your blood vessels and decrease blood flow. · Inspect your feet daily for blisters, cuts, cracks, or sores. If you cannot see well, use a mirror or have someone help you. · Take care of your feet:  ? Wash your feet every day. Use warm (not hot) water. Check the water temperature with your wrists or other part of your body, not your feet. ? Dry your feet well. Pat them dry. Do not rub the skin on your feet too hard. Dry well between your toes.  If the skin on your feet stays moist, bacteria or a fungus can grow, which can lead to infection. ? Keep your skin soft. Use moisturizing skin cream to keep the skin on your feet soft and prevent calluses and cracks. But do not put the cream between your toes, and stop using any cream that causes a rash. ? Clean underneath your toenails carefully. Do not use a sharp object to clean underneath your toenails. Use the blunt end of a nail file or other rounded tool. ? Trim and file your toenails straight across to prevent ingrown toenails. Use a nail clipper, not scissors. Use an emery board to smooth the edges. · Change socks daily. Socks without seams are best, because seams often rub the feet. You can find socks for people with diabetes from specialty catalogs. · Look inside your shoes every day for things like gravel or torn linings, which could cause blisters or sores. · Buy shoes that fit well:  ? Look for shoes that have plenty of space around the toes. This helps prevent bunions and blisters. ? Try on shoes while wearing the kind of socks you will usually wear with the shoes. ? Avoid plastic shoes. They may rub your feet and cause blisters. Good shoes should be made of materials that are flexible and breathable, such as leather or cloth. ? Break in new shoes slowly by wearing them for no more than an hour a day for several days. Take extra time to check your feet for red areas, blisters, or other problems after you wear new shoes. · Do not go barefoot. Do not wear sandals, and do not wear shoes with very thin soles. Thin soles are easy to puncture. They also do not protect your feet from hot pavement or cold weather. · Have your doctor check your feet during each visit. If you have a foot problem, see your doctor. Do not try to treat an early foot problem at home. Home remedies or treatments that you can buy without a prescription (such as corn removers) can be harmful. · Always get early treatment for foot problems.  A minor irritation can lead to a major problem if not properly cared for early. When should you call for help? Call your doctor now or seek immediate medical care if:    · You have a foot sore, an ulcer or break in the skin that is not healing after 4 days, bleeding corns or calluses, or an ingrown toenail.     · You have blue or black areas, which can mean bruising or blood flow problems.     · You have peeling skin or tiny blisters between your toes or cracking or oozing of the skin.     · You have a fever for more than 24 hours and a foot sore.     · You have new numbness or tingling in your feet that does not go away after you move your feet or change positions.     · You have unexplained or unusual swelling of the foot or ankle. Watch closely for changes in your health, and be sure to contact your doctor if:    · You cannot do proper foot care. Where can you learn more? Go to https://[employer]pekayla.VenueBook. org and sign in to your [employer] account. Enter A739 in the Hashdoc box to learn more about \"Diabetes Foot Health: Care Instructions. \"     If you do not have an account, please click on the \"Sign Up Now\" link. Current as of: July 28, 2021               Content Version: 13.1  © 2006-2021 [employer]. Care instructions adapted under license by [facility] ([facility]). If you have questions about a medical condition or this instruction, always ask your healthcare professional. [place] [address] any warranty or liability for your use of this information. Patient Education        Counting Carbohydrates for Diabetes: Care Instructions  Your Care Instructions     You don't have to eat special foods when you have diabetes. You just have to be careful to eat healthy foods. Carbohydrates (carbs) raise blood sugar higher and quicker than any other nutrient. Carbs are found in desserts, breads and cereals, and fruit. They're also in starchy vegetables. These include potatoes, corn, and grains such as rice and pasta.  Carbs are also in milk and yogurt. The more carbs you eat at one time, the higher your blood sugar will rise. Spreading carbs all through the day helps keep your blood sugar levels within your target range. Counting carbs is one of the best ways to keep your blood sugar under control. If you use insulin, counting carbs helps you match the right amount of insulin to the number of grams of carbs in a meal. Then you can change your diet and insulin dose as needed. Testing your blood sugar several times a day can help you learn how carbs affect your blood sugar. A registered dietitian or certified diabetes educator can help you plan meals and snacks. Follow-up care is a key part of your treatment and safety. Be sure to make and go to all appointments, and call your doctor if you are having problems. It's also a good idea to know your test results and keep a list of the medicines you take. How can you care for yourself at home? Know your daily amount of carbohydrates  Your daily amount depends on several things, such as your weight, how active you are, which diabetes medicines you take, and what your goals are for your blood sugar levels. A registered dietitian or certified diabetes educator can help you plan how many carbs to include in each meal and snack. For most adults, a guideline for the daily amount of carbs is:  · 45 to 60 grams at each meal. That's about the same as 3 to 4 carbohydrate servings. · 15 to 20 grams at each snack. That's about the same as 1 carbohydrate serving. Count carbs  Counting carbs lets you know how much rapid-acting insulin to take before you eat. If you use an insulin pump, you get a constant rate of insulin during the day. So the pump must be programmed at meals. This gives you extra insulin to cover the rise in blood sugar after meals. If you take insulin:  · Learn your own insulin-to-carb ratio. You and your diabetes health professional will figure out the ratio.  You can do this by testing your blood sugar after meals. For example, you may need a certain amount of insulin for every 15 grams of carbs. · Add up the carb grams in a meal. Then you can figure out how many units of insulin to take based on your insulin-to-carb ratio. · Exercise lowers blood sugar. You can use less insulin than you would if you were not doing exercise. Keep in mind that timing matters. If you exercise within 1 hour after a meal, your body may need less insulin for that meal than it would if you exercised 3 hours after the meal. Test your blood sugar to find out how exercise affects your need for insulin. If you do or don't take insulin:  · Look at labels on packaged foods. This can tell you how many carbs are in a serving. You can also use guides from the American Diabetes Association. · Be aware of portions, or serving sizes. If a package has two servings and you eat the whole package, you need to double the number of grams of carbohydrate listed for one serving. · Protein, fat, and fiber do not raise blood sugar as much as carbs do. If you eat a lot of these nutrients in a meal, your blood sugar will rise more slowly than it would otherwise. Eat from all food groups  · Eat at least three meals a day. · Plan meals to include food from all the food groups. The food groups include grains, fruits, dairy, proteins, and vegetables. · Talk to your dietitian or diabetes educator about ways to add limited amounts of sweets into your meal plan. · If you drink alcohol, talk to your doctor. It may not be recommended when you are taking certain diabetes medicines. Where can you learn more? Go to https://[employer].Consult A Doctor. org and sign in to your WeVideo account. Enter R493 in the [facility] box to learn more about \"Counting Carbohydrates for Diabetes: Care Instructions. \"     If you do not have an account, please click on the \"Sign Up Now\" link.   Current as of: July 28, 2021               Content Version: 13.1  © 8343-6749 [employer]. Care instructions adapted under license by [facility] ([facility]). If you have questions about a medical condition or this instruction, always ask your healthcare professional. [address] any warranty or liability for your use of this information.

## 2022-01-27 NOTE — PROGRESS NOTES
Bairon Luna : 1945 Sex: male  Age: 77 y.o. Chief Complaint   Patient presents with    Other     face to face [place] for incontinece supplies       Assessment and Plan:    Karyn Schwarz was seen today for other. Diagnoses and all orders for this visit:    Primary osteoarthritis involving multiple joints    Type 2 diabetes mellitus with stage 3b chronic kidney disease, without long-term current use of insulin (HCC)    Vitamin D deficiency    Essential hypertension, malignant    Cardiac murmur, unspecified    Facies suggestive of Down syndrome    Mental deficiency    Unspecified abnormalities of gait and mobility    At high risk for falls    Hyperlipidemia, unspecified hyperlipidemia type    History of colon polyps        USPTF:    () Colorectal Cancer: Screening --Adults aged 48 to 76 years  Grade: A (Recommended) recommends screening for colorectal cancer starting at age 48 years and continuing until age 76 years. (Non reactive) HIV: Screening - Adolescents and Adults  Grade: A (Recommended) recommends that clinicians screen for HIV infection in ages 13 to 72 years. (B/P 135/93) High Blood Pressure: Screening and Home Monitoring -- Adults  Grade: A (Recommended) recommends screening for high blood pressure in ages 25 years or older. obtain measurements outside of the clinical setting for diagnostic confirmation before starting treatment. Annual screening for adults aged 36 years or older or those who are at increased risk for blood pressure    (, Trig 103, LDL 63)  Lipid Disorders in Adults: Screening -- Men 28 and Older  Grade: A (Recommended) recommends screening men aged 28 and older for lipid disorders.      (Nondrinker) Alcohol Misuse: Screening and Behavioral Counseling Interventions in Primary Care -- Adults  Grade: B (Recommended) recommends that clinicians screen adults aged 25 years or older for alcohol misuse and provide persons engaged in risky or hazardous drinking with brief behavioral counseling interventions to reduce alcohol misuse. (AIC 6.4) Abnormal Blood Glucose and Type 2 Diabetes Mellitus: Screening -- Adults aged 36 to 79 years who are overweight or obese Grade: B (Recommended)    (Never smoked) Abdominal Aortic Aneurysm: Screening -- Men Ages 72 to 76 Years Who Have Ever Smoked. Grade: B (Recommended) recommends one-time screening for abdominal aortic aneurysm (AAA) with ultrasonography in men ages 72 to 76 years who have ever smoked. (BMI 41.40)  Obesity: Screening for and Management of-- All Adults  Grade: B(Recommended) recommends screening all adults for obesity. Clinicians should offer or refer patients with a body mass index (BMI) of 30 kg/m2 or higher to intensive, multicomponent behavioral interventions. (Not at high risk) Hepatitis C Virus Infection: Screening--Adults at High Risk and Adults born between 1945 and 1965  Grade: B (Recommended) recommends screening for hepatitis C virus (HCV) infection in persons at high risk for infection. The USPSTF also recommends offering 1-time screening for HCV infection to adults born between 80 and 1965. (High risk)  Fall Prevention -- Exercise/Physical Therapy: Community-dwelling Adults 72 Years or Older, Increased Risk for Falls   Grade: B (Recommended) recommends exercise or physical therapy to prevent falls in community-dwelling adults aged 72 years or older who are at increased risk for falls. (Negative)  Depression: Screening -- General adult population, including pregnant and postpartum women  Grade: B(Recommended) recommends screening for depression in the general adult population,  Screening should be implemented with adequate systems in place to ensure accurate diagnosis, effective treatment, and appropriate follow-up.     (2020) Glaucoma: Screening - Adults and Diabetic Eye Exam     (2020) Vitamin D Deficiency: Screening --       (TSH 3.79) Thyroid Dysfunction: Screening --      (2020) Skin Cancer: Screening --Asymptomatic adults  Grade: I(Uncertain)     (Done) Carotid Artery Stenosis: Screening -- Adults  Grade: D (Not Recommended)     (2020) Coronary Heart Disease: Screening with Electrocardiography--Adults at Low Risk  Grade: D (Not Recommended) echocardiogram done in 2017 along with a chemical stress test    (2019) Prostate Cancer: Prostate-Specific Antigen (PSA)-Based Screening -- All Men     PSA 1.75 Dec 29 2020  PSA 1.8 December 13, 2019  PSA 2.0 November 28, 2018  PSA 1.9 September 2017  PSA 1.4 July 2017  PSA 1.2 July 2016      Educational materials  printed for patient's review and were included in patient instructions on his After Visit Summary and given to patient at the end of visit. Counseled regarding above diagnosis, including possible risks and complications,  especially if left uncontrolled. Counseled regarding the possible side effects, risks, benefits and alternatives to treatment; patient and/or guardian verbalizes understanding, agrees, feels comfortable with and wishes to proceed with above treatment plan. Advised patient to call with any new medication issues, and read all Rx info from pharmacy to assure aware of all possible risks and side effects of medication before taking. Reviewed age and gender appropriate health screening exams and vaccinations. Advised patient regarding importance of keeping up with recommended health maintenance and to schedule as soon as possible if overdue, as this is important in assessing for undiagnosed pathology, especially cancer, as well as protecting against potentially harmful/life threatening disease. Patient verbalizes understanding and agrees with above counseling, assessment and plan. All questions answered.     On 01/27/22 I have spent 30 reviewing previous notes, test results and face to face with the patient discussing the diagnosis and importance of compliance with the treatment plan as well as documenting on the day of the visit. Educational materials exercises printed for patient's review and were included in patient instructions on their After Visit Summary and given to patient at the end of visit.      Return if symptoms worsen or fail to improve. Federico Simmons is a very pleasant 71-year-old white male who presents for a face-to-face for durable goods. He has had no changes in his condition. Medications were reviewed past labs were reviewed and his chronic conditions were all updated. He seems to be almost up-to-date with all of his care gaps this is a very compliant patient and is under the care of [facility] services who help him with integration into society. CHRONIC CONDITION:    DM: Mild intensity but controlled on   OZEMPIC, 0.25 OR 0.5 MG/DOSE, 2 MG/1.5ML SOPN, Inject 0.5 mg into the skin once a week , Disp: , Rfl:    remains without symptoms, no weight loss, no increase in thirst, or urination and no low blood sugar incidents. No reports of poor circulation or issues with feet ulceration or injury. Is better when compliant with diet and medications. Vision Exam Up to Date:                             Y   2021  Currently on an ACE  :                                Y  Most Recent Hgb A1c <7:                           Y   AIC 6.4   Microalbumin Test Reviewed: Y   Foot Exam Up To Date:                               Y 2021  Flu Shot Up To Date:                                   Y 2021  Has patient been seen by Neurology           N   Currently on a Statin                                    Y       HTN: Stable hypertension, controlled on     amLODIPine (NORVASC) 10 MG tablet, Take 10 mg by mouth daily, Disp: , Rfl:    enalapril (VASOTEC) 10 MG tablet, Take 10 mg by mouth daily, Disp: , Rfl:    remains at a mild intensity but overall good control, without symptoms, no ringing in the ears, no headaches and no nose bleeds. Better on medications.     CAD and CHF: Patient currently not on a Betablocker but is on an ACE for LVSD. Tobacco Use: N    Flu Shot Up To Date: Y     Meds Listed in Medical Record: Y     Hyperlipidemia: Mild in intensity but controlled on    atorvastatin (LIPITOR) 20 MG tablet, Take 20 mg by mouth daily, Disp: , Rfl:  without symptoms, no complications with dietary treatment regimen reporting no side effects or intolereances. Compliant with treatment and diet. No muscle aches, new joint pains or abd pain. Hypothyroid: Remains controlled, without symptoms, on    levothyroxine (SYNTHROID) 100 MCG tablet, Take 100 mcg by mouth Daily, Disp: , Rfl:   with no evidence of weight gain, palpitations, fatigue, difficulty swallowing, neck prominence or hair loss. Is better on medications and worsens with forgetting to take medications. Has been on medications religiously since last visit. Depression/Anxiety/PBA/GOUT: Stable depression with generalized anxiety. Mild in intensity but well controlled on   traMADol (ULTRAM) 50 MG tablet, Take 1 tablet by mouth 3 times daily for 93 days. , Disp: 93 tablet, Rfl:   fluticasone (FLONASE) 50 MCG/ACT nasal spray, 1 spray by Each Nostril route daily, Disp: , Rfl:   betamethasone dipropionate (DIPROLENE) 0.05 % cream, Apply topically 2 times daily.  PRN for 14 days and then take 7 day break, Disp: 1 Tube, Rfl: 3  Azelastine HCl 137 MCG/SPRAY SOLN, 2 sprays by Nasal route daily In each nostril, Disp: , Rfl:  acetaminophen (TYLENOL) 325 MG tablet, Take 325 mg by mouth 3 times daily , Disp: , Rfl:   allopurinol (ZYLOPRIM) 100 MG tablet, Take 100 mg by mouth daily, Disp: , Rfl:   FLUoxetine (PROZAC) 40 MG capsule, Take 40 mg by mouth daily Take with 20 mg capsuleto equal 60 mg daily, Disp: , Rfl:   oxybutynin (DITROPAN-XL) 5 MG extended release tablet, Take 5 mg by mouth nightly, Disp: , Rfl:   docusate sodium (COLACE) 100 MG capsule, Take 100 mg by mouth 1 CAPSULE EVERY MORNING AND 2 CAPSULES NIGHTLY, Disp: , Rfl:   Aspirin Buf,[id number]-QbAuc-EwDiu, (BUFFERED ASPIRIN) 325 MG TABS, Take 1 tablet by mouth daily, Disp: , Rfl:   FLUoxetine (PROZAC) 20 MG capsule, Take 20 mg by mouth daily Take with 40 mg capsule to equal 60mg daily, Disp: , Rfl:   diclofenac sodium (VOLTAREN) 1 % GEL, Apply 4 g topically 4 times daily, Disp: , Rfl:   menthol-zinc oxide (CALMOSEPTINE) 0.44-20.625 % OINT ointment, Apply 1 applicator topically 2 times daily Prn. Max 30 ml per day., Disp: , Rfl:    without symptoms, no weight gain, no increase in anxiety, no suicidal or homicidal ideation's no unexplained fatigue, or relationship difficulties relayed this visit. Other  Pertinent negatives include no abdominal pain, arthralgias, chest pain, chills, coughing, diaphoresis, fatigue, fever, headaches, joint swelling, myalgias, nausea, numbness, rash, vomiting or weakness. Review of Systems   Constitutional: Negative for activity change, chills, diaphoresis, fatigue, fever and unexpected weight change. HENT: Negative for trouble swallowing and voice change. Eyes: Negative for visual disturbance. Respiratory: Negative for cough, chest tightness, shortness of breath and wheezing. Cardiovascular: Negative for chest pain, palpitations and leg swelling. Gastrointestinal: Negative for abdominal pain, blood in stool, constipation, diarrhea, nausea and vomiting. Endocrine: Negative for polydipsia, polyphagia and polyuria. Genitourinary: Negative for dysuria, enuresis, frequency and hematuria. Musculoskeletal: Negative for arthralgias, back pain, gait problem, joint swelling, myalgias and neck stiffness. Skin: Negative for rash. Bruising noted along the chest wall no other falls   Neurological: Negative for dizziness, seizures, syncope, facial asymmetry, weakness, light-headedness, numbness and headaches. Hematological: Does not bruise/bleed easily.    Psychiatric/Behavioral: Negative for behavioral problems, confusion, hallucinations and suicidal ideas. The patient is not nervous/anxious. Current Outpatient Medications:     traMADol (ULTRAM) 50 MG tablet, Take 1 tablet by mouth 3 times daily for 93 days. , Disp: 93 tablet, Rfl: 2    zoster recombinant adjuvanted vaccine (SHINGRIX) 50 MCG/0.5ML SUSR injection, Inject 0.5 mLs into the muscle See Admin Instructions 1 dose now and repeat in 2-6 months, Disp: 0.5 mL, Rfl: 0    fluticasone (FLONASE) 50 MCG/ACT nasal spray, 1 spray by Each Nostril route daily, Disp: , Rfl:     OZEMPIC, 0.25 OR 0.5 MG/DOSE, 2 MG/1.5ML SOPN, Inject 0.5 mg into the skin once a week , Disp: , Rfl:     betamethasone dipropionate (DIPROLENE) 0.05 % cream, Apply topically 2 times daily.  PRN for 14 days and then take 7 day break, Disp: 1 Tube, Rfl: 3    Azelastine HCl 137 MCG/SPRAY SOLN, 2 sprays by Nasal route daily In each nostril, Disp: , Rfl:     atorvastatin (LIPITOR) 20 MG tablet, Take 20 mg by mouth daily, Disp: , Rfl:     levothyroxine (SYNTHROID) 100 MCG tablet, Take 100 mcg by mouth Daily, Disp: , Rfl:     acetaminophen (TYLENOL) 325 MG tablet, Take 325 mg by mouth 3 times daily , Disp: , Rfl:     amLODIPine (NORVASC) 10 MG tablet, Take 10 mg by mouth daily, Disp: , Rfl:     enalapril (VASOTEC) 10 MG tablet, Take 10 mg by mouth daily, Disp: , Rfl:     allopurinol (ZYLOPRIM) 100 MG tablet, Take 100 mg by mouth daily, Disp: , Rfl:     FLUoxetine (PROZAC) 40 MG capsule, Take 40 mg by mouth daily Take with 20 mg capsuleto equal 60 mg daily, Disp: , Rfl:     oxybutynin (DITROPAN-XL) 5 MG extended release tablet, Take 5 mg by mouth nightly, Disp: , Rfl:     docusate sodium (COLACE) 100 MG capsule, Take 100 mg by mouth 1 CAPSULE EVERY MORNING AND 2 CAPSULES NIGHTLY, Disp: , Rfl:     Aspirin Buf,GcRib-CyWxi-PyZnb, (BUFFERED ASPIRIN) 325 MG TABS, Take 1 tablet by mouth daily, Disp: , Rfl:     FLUoxetine (PROZAC) 20 MG capsule, Take 20 mg by mouth daily Take with 40 mg capsule to equal 60mg daily, Disp: , Rfl:     diclofenac sodium (VOLTAREN) 1 % GEL, Apply 4 g topically 4 times daily, Disp: , Rfl:     menthol-zinc oxide (CALMOSEPTINE) 0.44-20.625 % OINT ointment, Apply 1 applicator topically 2 times daily Prn. Max 30 ml per day., Disp: , Rfl:   No Known Allergies    Past Medical History:   Diagnosis Date    Chronic kidney disease (CKD), stage III (moderate) (HCC)     Diabetes mellitus (HCC)     DJD (degenerative joint disease)     Facies suggestive of Down syndrome     Hearing impaired     Hyperlipidemia     Hypertension     Hypothyroid     Mental deficiency     Urge urinary incontinence      Past Surgical History:   Procedure Laterality Date    COLONOSCOPY      COLONOSCOPY N/A 8/11/2021    COLONOSCOPY DIAGNOSTIC performed by Nan Monroe DO at [facility] END[facility]COPY     No family history on file. Social History     Socioeconomic History    Marital status: Single     Spouse name: Not on file    Number of children: Not on file    Years of education: Not on file    Highest education level: Not on file   Occupational History    Not on file   Tobacco Use    Smoking status: Never Smoker    Smokeless tobacco: Never Used   Vaping Use    Vaping Use: Never used   Substance and Sexual Activity    Alcohol use: Never    Drug use: Never    Sexual activity: Not on file   Other Topics Concern    Not on file   Social History Narrative    Not on file     Social Determinants of Health     Financial Resource Strain: Low Risk     Difficulty of Paying Living Expenses: Not hard at all   Food Insecurity: No Food Insecurity    Worried About Running Out of Food in the Last Year: Never true    [address] in the Last Year: Never true   Transportation Needs:     Lack of Transportation (Medical): Not on file    Lack of Transportation (Non-Medical):  Not on file   Physical Activity:     Days of Exercise per Week: Not on file    Minutes of Exercise per Session: Not on file   Stress:     Feeling of Stress : Not on file   Social Connections:     Frequency of Communication with Friends and Family: Not on file    Frequency of Social Gatherings with Friends and Family: Not on file    Attends Alevism Services: Not on file    Active Member of Clubs or Organizations: Not on file    Attends Club or Organization Meetings: Not on file    Marital Status: Not on file   Intimate Partner Violence:     Fear of Current or Ex-Partner: Not on file    Emotionally Abused: Not on file    Physically Abused: Not on file    Sexually Abused: Not on file   Housing Stability:     Unable to Pay for Housing in the Last Year: Not on file    Number of [place] in the Last Year: Not on file    Unstable Housing in the Last Year: Not on file       There were no vitals filed for this visit. Physical Exam  Vitals and nursing note reviewed. Constitutional:       Appearance: Normal appearance. HENT:      Head: Normocephalic. Nose: Nose normal.      Mouth/Throat:      Mouth: Mucous membranes are dry. Eyes:      Extraocular Movements: Extraocular movements intact. Pupils: Pupils are equal, round, and reactive to light. Neck:      Vascular: No carotid bruit. Cardiovascular:      Rate and Rhythm: Normal rate and regular rhythm. Pulses: Normal pulses. Heart sounds: Normal heart sounds. No murmur heard. No friction rub. No gallop. Comments: Per nurse at site  Pulmonary:      Effort: Pulmonary effort is normal.      Breath sounds: Normal breath sounds. No stridor. No wheezing, rhonchi or rales. Comments: Per nurse auscultation at site  Chest:      Chest wall: No tenderness. Abdominal:      Palpations: Abdomen is soft. Musculoskeletal:         General: No swelling, tenderness, deformity or signs of injury. Cervical back: No rigidity. No muscular tenderness. Right lower leg: No edema. Left lower leg: No edema.    Lymphadenopathy:      Cervical: No cervical adenopathy. Skin:     General: Skin is warm and dry. Capillary Refill: Capillary refill takes 2 to 3 seconds. Findings: No bruising, lesion or rash. Neurological:      General: No focal deficit present. Mental Status: He is alert and oriented to person, place, and time. Motor: No weakness. Gait: Gait abnormal.   Psychiatric:         Attention and Perception: Attention normal.         Mood and Affect: Mood normal.         Behavior: Behavior normal.         Thought Content: Thought content does not include homicidal or suicidal ideation. Thought content does not include homicidal or suicidal plan. Shanice Fernandez is a 77 y.o. male being evaluated by a Virtual Visit (video visit) encounter to address concerns as mentioned above. A caregiver was present when appropriate. Due to this being a TeleHealth encounter ([facility]), evaluation of the following organ systems was limited: Vitals/Constitutional/EENT/Resp/CV/GI//MS/Neuro/Skin/Heme-Lymph-Imm. Pursuant to the emergency declaration under the [address] [place] [address] and the [employer] Act, this Virtual Visit was conducted with patient's (and/or legal guardian's) consent, to reduce the patient's risk of exposure to COVID-19 and provide necessary medical care. The patient (and/or legal guardian) has also been advised to contact this office for worsening conditions or problems, and seek emergency medical treatment and/or call 911 if deemed necessary. Patient identification was verified at the start of the visit: Yes    Total time spent on this encounter: 15 min    Services were provided through a video synchronous discussion virtually to substitute for in-person clinic visit. Patient and provider were located at their individual homes.          Seen By:  DARREN Richter CNP  On the basis of positive falls risk screening, assessment and plan is as follows: home safety tips provided.

## 2022-02-01 ENCOUNTER — TELEPHONE (OUTPATIENT)
Dept: FAMILY MEDICINE CLINIC | Age: 77
End: 2022-02-01

## 2022-02-11 DIAGNOSIS — M15.9 PRIMARY OSTEOARTHRITIS INVOLVING MULTIPLE JOINTS: ICD-10-CM

## 2022-02-11 RX ORDER — TRAMADOL HYDROCHLORIDE 50 MG/1
50 TABLET ORAL 3 TIMES DAILY
Qty: 90 TABLET | Refills: 0 | Status: SHIPPED
Start: 2022-02-11 | End: 2022-03-15 | Stop reason: SDUPTHER

## 2022-03-10 ENCOUNTER — NURSE ONLY (OUTPATIENT)
Dept: FAMILY MEDICINE CLINIC | Age: 77
End: 2022-03-10
Payer: MEDICARE

## 2022-03-10 DIAGNOSIS — I12.9 HYPERTENSIVE RENAL DISEASE: ICD-10-CM

## 2022-03-10 DIAGNOSIS — Z12.5 ENCOUNTER FOR PROSTATE CANCER SCREENING: ICD-10-CM

## 2022-03-10 DIAGNOSIS — E78.5 HYPERLIPIDEMIA, UNSPECIFIED HYPERLIPIDEMIA TYPE: ICD-10-CM

## 2022-03-10 DIAGNOSIS — E03.8 OTHER SPECIFIED HYPOTHYROIDISM: ICD-10-CM

## 2022-03-10 DIAGNOSIS — Z12.5 SPECIAL SCREENING FOR MALIGNANT NEOPLASM OF PROSTATE: ICD-10-CM

## 2022-03-10 DIAGNOSIS — E11.22 TYPE 2 DIABETES MELLITUS WITH STAGE 3B CHRONIC KIDNEY DISEASE, WITHOUT LONG-TERM CURRENT USE OF INSULIN (HCC): ICD-10-CM

## 2022-03-10 DIAGNOSIS — E55.9 VITAMIN D DEFICIENCY: ICD-10-CM

## 2022-03-10 DIAGNOSIS — I10 ESSENTIAL HYPERTENSION, MALIGNANT: ICD-10-CM

## 2022-03-10 DIAGNOSIS — N18.32 TYPE 2 DIABETES MELLITUS WITH STAGE 3B CHRONIC KIDNEY DISEASE, WITHOUT LONG-TERM CURRENT USE OF INSULIN (HCC): ICD-10-CM

## 2022-03-10 LAB
ALBUMIN SERPL-MCNC: 3.9 G/DL (ref 3.5–5.2)
ALP BLD-CCNC: 118 U/L (ref 40–129)
ALT SERPL-CCNC: 24 U/L (ref 0–40)
ANION GAP SERPL CALCULATED.3IONS-SCNC: 17 MMOL/L (ref 7–16)
AST SERPL-CCNC: 28 U/L (ref 0–39)
BASOPHILS ABSOLUTE: 0.05 E9/L (ref 0–0.2)
BASOPHILS RELATIVE PERCENT: 0.5 % (ref 0–2)
BILIRUB SERPL-MCNC: 0.6 MG/DL (ref 0–1.2)
BUN BLDV-MCNC: 24 MG/DL (ref 6–23)
CALCIUM SERPL-MCNC: 9.3 MG/DL (ref 8.6–10.2)
CHLORIDE BLD-SCNC: 104 MMOL/L (ref 98–107)
CHOLESTEROL, TOTAL: 143 MG/DL (ref 0–199)
CO2: 19 MMOL/L (ref 22–29)
CREAT SERPL-MCNC: 1.3 MG/DL (ref 0.7–1.2)
EOSINOPHILS ABSOLUTE: 0.19 E9/L (ref 0.05–0.5)
EOSINOPHILS RELATIVE PERCENT: 1.7 % (ref 0–6)
GFR AFRICAN AMERICAN: >60
GFR NON-AFRICAN AMERICAN: 54 ML/MIN/1.73
GLUCOSE FASTING: 130 MG/DL (ref 74–99)
HBA1C MFR BLD: 6.8 % (ref 4–5.6)
HCT VFR BLD CALC: 45.1 % (ref 37–54)
HDLC SERPL-MCNC: 44 MG/DL
HEMOGLOBIN: 14.3 G/DL (ref 12.5–16.5)
IMMATURE GRANULOCYTES #: 0.05 E9/L
IMMATURE GRANULOCYTES %: 0.5 % (ref 0–5)
LDL CHOLESTEROL CALCULATED: 78 MG/DL (ref 0–99)
LYMPHOCYTES ABSOLUTE: 3.03 E9/L (ref 1.5–4)
LYMPHOCYTES RELATIVE PERCENT: 27.8 % (ref 20–42)
MCH RBC QN AUTO: 32.9 PG (ref 26–35)
MCHC RBC AUTO-ENTMCNC: 31.7 % (ref 32–34.5)
MCV RBC AUTO: 103.9 FL (ref 80–99.9)
MONOCYTES ABSOLUTE: 1.09 E9/L (ref 0.1–0.95)
MONOCYTES RELATIVE PERCENT: 10 % (ref 2–12)
NEUTROPHILS ABSOLUTE: 6.48 E9/L (ref 1.8–7.3)
NEUTROPHILS RELATIVE PERCENT: 59.5 % (ref 43–80)
PDW BLD-RTO: 13.7 FL (ref 11.5–15)
PLATELET # BLD: 285 E9/L (ref 130–450)
PMV BLD AUTO: 9.8 FL (ref 7–12)
POTASSIUM SERPL-SCNC: 4.8 MMOL/L (ref 3.5–5)
PROSTATE SPECIFIC ANTIGEN: 1.39 NG/ML (ref 0–4)
RBC # BLD: 4.34 E12/L (ref 3.8–5.8)
SODIUM BLD-SCNC: 140 MMOL/L (ref 132–146)
TOTAL PROTEIN: 7.4 G/DL (ref 6.4–8.3)
TRIGL SERPL-MCNC: 103 MG/DL (ref 0–149)
TSH SERPL DL<=0.05 MIU/L-ACNC: 4.36 UIU/ML (ref 0.27–4.2)
VITAMIN D 25-HYDROXY: 15 NG/ML (ref 30–100)
VLDLC SERPL CALC-MCNC: 21 MG/DL
WBC # BLD: 10.9 E9/L (ref 4.5–11.5)

## 2022-03-10 PROCEDURE — 36415 COLL VENOUS BLD VENIPUNCTURE: CPT | Performed by: NURSE PRACTITIONER

## 2022-03-10 PROCEDURE — 99999 PR OFFICE/OUTPT VISIT,PROCEDURE ONLY: CPT | Performed by: NURSE PRACTITIONER

## 2022-03-15 DIAGNOSIS — M15.9 PRIMARY OSTEOARTHRITIS INVOLVING MULTIPLE JOINTS: ICD-10-CM

## 2022-03-15 RX ORDER — TRAMADOL HYDROCHLORIDE 50 MG/1
50 TABLET ORAL 3 TIMES DAILY
Qty: 90 TABLET | Refills: 0 | Status: SHIPPED
Start: 2022-03-15 | End: 2022-04-08 | Stop reason: SDUPTHER

## 2022-03-15 NOTE — TELEPHONE ENCOUNTER
Patients last appointment 1/27/2022.   Patients next scheduled appointment   Future Appointments   Date Time Provider Rhonda Sotomayor   3/17/2022 10:00 AM DARREN Hernandez - CNP [facility]   3/25/2022 10:00 AM GEOVANY Ross BD [facility]

## 2022-03-22 ENCOUNTER — OFFICE VISIT (OUTPATIENT)
Dept: FAMILY MEDICINE CLINIC | Age: 77
End: 2022-03-22
Payer: MEDICARE

## 2022-03-22 VITALS
HEART RATE: 117 BPM | SYSTOLIC BLOOD PRESSURE: 128 MMHG | OXYGEN SATURATION: 100 % | DIASTOLIC BLOOD PRESSURE: 80 MMHG | TEMPERATURE: 97.3 F

## 2022-03-22 DIAGNOSIS — I12.9 HYPERTENSIVE RENAL DISEASE: ICD-10-CM

## 2022-03-22 DIAGNOSIS — E55.9 VITAMIN D DEFICIENCY: ICD-10-CM

## 2022-03-22 DIAGNOSIS — N18.32 TYPE 2 DIABETES MELLITUS WITH STAGE 3B CHRONIC KIDNEY DISEASE, WITHOUT LONG-TERM CURRENT USE OF INSULIN (HCC): ICD-10-CM

## 2022-03-22 DIAGNOSIS — E78.5 HYPERLIPIDEMIA, UNSPECIFIED HYPERLIPIDEMIA TYPE: ICD-10-CM

## 2022-03-22 DIAGNOSIS — E11.22 TYPE 2 DIABETES MELLITUS WITH STAGE 3B CHRONIC KIDNEY DISEASE, WITHOUT LONG-TERM CURRENT USE OF INSULIN (HCC): Primary | ICD-10-CM

## 2022-03-22 DIAGNOSIS — E66.01 SEVERE OBESITY (BMI 35.0-39.9) WITH COMORBIDITY (HCC): ICD-10-CM

## 2022-03-22 DIAGNOSIS — Q90.9: ICD-10-CM

## 2022-03-22 DIAGNOSIS — I10 ESSENTIAL HYPERTENSION, MALIGNANT: ICD-10-CM

## 2022-03-22 DIAGNOSIS — N18.32 TYPE 2 DIABETES MELLITUS WITH STAGE 3B CHRONIC KIDNEY DISEASE, WITHOUT LONG-TERM CURRENT USE OF INSULIN (HCC): Primary | ICD-10-CM

## 2022-03-22 DIAGNOSIS — R01.1 CARDIAC MURMUR, UNSPECIFIED: ICD-10-CM

## 2022-03-22 DIAGNOSIS — E11.22 TYPE 2 DIABETES MELLITUS WITH STAGE 3B CHRONIC KIDNEY DISEASE, WITHOUT LONG-TERM CURRENT USE OF INSULIN (HCC): ICD-10-CM

## 2022-03-22 LAB
BACTERIA: ABNORMAL /HPF
BILIRUBIN URINE: NEGATIVE
BLOOD, URINE: ABNORMAL
CLARITY: ABNORMAL
COLOR: YELLOW
GLUCOSE URINE: NEGATIVE MG/DL
KETONES, URINE: NEGATIVE MG/DL
LEUKOCYTE ESTERASE, URINE: ABNORMAL
MICROALBUMIN UR-MCNC: 59.2 MG/L
NITRITE, URINE: NEGATIVE
PH UA: 6.5 (ref 5–9)
PROTEIN UA: NEGATIVE MG/DL
RBC UA: ABNORMAL /HPF (ref 0–2)
SPECIFIC GRAVITY UA: 1.01 (ref 1–1.03)
UROBILINOGEN, URINE: 1 E.U./DL
WBC UA: ABNORMAL /HPF (ref 0–5)

## 2022-03-22 PROCEDURE — 4040F PNEUMOC VAC/ADMIN/RCVD: CPT | Performed by: NURSE PRACTITIONER

## 2022-03-22 PROCEDURE — 3044F HG A1C LEVEL LT 7.0%: CPT | Performed by: NURSE PRACTITIONER

## 2022-03-22 PROCEDURE — G8484 FLU IMMUNIZE NO ADMIN: HCPCS | Performed by: NURSE PRACTITIONER

## 2022-03-22 PROCEDURE — 1123F ACP DISCUSS/DSCN MKR DOCD: CPT | Performed by: NURSE PRACTITIONER

## 2022-03-22 PROCEDURE — 1036F TOBACCO NON-USER: CPT | Performed by: NURSE PRACTITIONER

## 2022-03-22 PROCEDURE — 99214 OFFICE O/P EST MOD 30 MIN: CPT | Performed by: NURSE PRACTITIONER

## 2022-03-22 PROCEDURE — G8417 CALC BMI ABV UP PARAM F/U: HCPCS | Performed by: NURSE PRACTITIONER

## 2022-03-22 PROCEDURE — G8427 DOCREV CUR MEDS BY ELIG CLIN: HCPCS | Performed by: NURSE PRACTITIONER

## 2022-03-22 PROCEDURE — 3017F COLORECTAL CA SCREEN DOC REV: CPT | Performed by: NURSE PRACTITIONER

## 2022-03-22 PROCEDURE — 2022F DILAT RTA XM EVC RTNOPTHY: CPT | Performed by: NURSE PRACTITIONER

## 2022-03-22 ASSESSMENT — ENCOUNTER SYMPTOMS
NAUSEA: 0
ABDOMINAL PAIN: 0
WHEEZING: 0
VOICE CHANGE: 0
VOMITING: 0
SHORTNESS OF BREATH: 0
TROUBLE SWALLOWING: 0
BLOOD IN STOOL: 0
BACK PAIN: 0
CHEST TIGHTNESS: 0
CONSTIPATION: 0
DIARRHEA: 0
COUGH: 0

## 2022-03-22 NOTE — PROGRESS NOTES
Juan Del Cid : 1945 Sex: male  Age: 77 y.o. Chief Complaint   Patient presents with    Discuss Labs       Assessment and Plan:    Eileen Schilder was seen today for discuss labs. Diagnoses and all orders for this visit:    Type 2 diabetes mellitus with stage 3b chronic kidney disease, without long-term current use of insulin (HCC)    Hyperlipidemia, unspecified hyperlipidemia type    Essential hypertension, malignant    Vitamin D deficiency    Cardiac murmur, unspecified    Facies suggestive of Down syndrome    Hypertensive renal disease        USPTF:    () Colorectal Cancer: Screening --Adults aged 48 to 76 years  Grade: A (Recommended) recommends screening for colorectal cancer starting at age 48 years and continuing until age 76 years. (Non reactive) HIV: Screening - Adolescents and Adults  Grade: A (Recommended) recommends that clinicians screen for HIV infection in ages 13 to 72 years. (B/P 128/80) High Blood Pressure: Screening and Home Monitoring -- Adults  Grade: A (Recommended) recommends screening for high blood pressure in ages 25 years or older. obtain measurements outside of the clinical setting for diagnostic confirmation before starting treatment. Annual screening for adults aged 36 years or older or those who are at increased risk for blood pressure    (, Trig 103, LDL 63)  Lipid Disorders in Adults: Screening -- Men 28 and Older  Grade: A (Recommended) recommends screening men aged 28 and older for lipid disorders. (Nondrinker) Alcohol Misuse: Screening and Behavioral Counseling Interventions in Primary Care -- Adults  Grade: B (Recommended) recommends that clinicians screen adults aged 25 years or older for alcohol misuse and provide persons engaged in risky or hazardous drinking with brief behavioral counseling interventions to reduce alcohol misuse.      (AIC 6.4) Abnormal Blood Glucose and Type 2 Diabetes Mellitus: Screening -- Adults aged 36 to 79 years who are overweight or obese Grade: B (Recommended)    (Never smoked) Abdominal Aortic Aneurysm: Screening -- Men Ages 72 to 76 Years Who Have Ever Smoked. Grade: B (Recommended) recommends one-time screening for abdominal aortic aneurysm (AAA) with ultrasonography in men ages 72 to 76 years who have ever smoked. (BMI 41.40)  Obesity: Screening for and Management of-- All Adults  Grade: B(Recommended) recommends screening all adults for obesity. Clinicians should offer or refer patients with a body mass index (BMI) of 30 kg/m2 or higher to intensive, multicomponent behavioral interventions. (Not at high risk) Hepatitis C Virus Infection: Screening--Adults at High Risk and Adults born between 1945 and 1965  Grade: B (Recommended) recommends screening for hepatitis C virus (HCV) infection in persons at high risk for infection. The USPSTF also recommends offering 1-time screening for HCV infection to adults born between 80 and 1965. (High risk)  Fall Prevention -- Exercise/Physical Therapy: Community-dwelling Adults 72 Years or Older, Increased Risk for Falls   Grade: B (Recommended) recommends exercise or physical therapy to prevent falls in community-dwelling adults aged 72 years or older who are at increased risk for falls. (Negative)  Depression: Screening -- General adult population, including pregnant and postpartum women  Grade: B(Recommended) recommends screening for depression in the general adult population,  Screening should be implemented with adequate systems in place to ensure accurate diagnosis, effective treatment, and appropriate follow-up. (2020) Glaucoma: Screening - Adults and Diabetic Eye Exam     (2020) Vitamin D Deficiency: Screening --       (TSH 3.79) Thyroid Dysfunction: Screening --      (2020) Skin Cancer: Screening --Asymptomatic adults  Grade:  I(Uncertain)     (Done) Carotid Artery Stenosis: Screening -- Adults  Grade: D (Not Recommended)     (2020) Coronary Heart Disease: Screening with Electrocardiography--Adults at Low Risk  Grade: D (Not Recommended) echocardiogram done in 2017 along with a chemical stress test    (2019) Prostate Cancer: Prostate-Specific Antigen (PSA)-Based Screening -- All Men   PSA 1.75 Dec 29 2020  PSA 1.8 December 13, 2019  PSA 2.0 November 28, 2018  PSA 1.9 September 2017  PSA 1.4 July 2017  PSA 1.2 July 2016      Educational materials  printed for patient's review and were included in patient instructions on his After Visit Summary and given to patient at the end of visit. Counseled regarding above diagnosis, including possible risks and complications,  especially if left uncontrolled. Counseled regarding the possible side effects, risks, benefits and alternatives to treatment; patient and/or guardian verbalizes understanding, agrees, feels comfortable with and wishes to proceed with above treatment plan. Advised patient to call with any new medication issues, and read all Rx info from pharmacy to assure aware of all possible risks and side effects of medication before taking. Reviewed age and gender appropriate health screening exams and vaccinations. Advised patient regarding importance of keeping up with recommended health maintenance and to schedule as soon as possible if overdue, as this is important in assessing for undiagnosed pathology, especially cancer, as well as protecting against potentially harmful/life threatening disease. Patient verbalizes understanding and agrees with above counseling, assessment and plan. All questions answered. On 03/22/22 I have spent 30 reviewing previous notes, test results and face to face with the patient discussing the diagnosis and importance of compliance with the treatment plan as well as documenting on the day of the visit.   Educational materials exercises printed for patient's review and were included in patient instructions on their After Visit Summary and given to patient at the end of visit.      Return in about 6 months (around 9/22/2022) for Routine Visit with Labs. This is a very pleasant 76-year-old white male who presents today with a hockey jersey on. He is very interested in his diapers I guess there was a miscommunication and he has not gotten any interview days but that seems to have been rectified he is here for evaluation and management of chronic medical problems. Current medication list reviewed. The patient is tolerating all medications well without adverse events or known side effects. The patient does understand the risk and benefits of the prescribed medications. The patient is not up-to-date on all age-appropriate wellness issues. Patient denies any reccent hospitalizations or ER visit. No Acute Complaints reported:    CHRONIC CONDITION:    DM: Mild intensity but controlled on   OZEMPIC, 0.25 OR 0.5 MG/DOSE, 2 MG/1.5ML SOPN, Inject 0.5 mg into the skin once a week , Disp: , Rfl:    remains without symptoms, no weight loss, no increase in thirst, or urination and no low blood sugar incidents. No reports of poor circulation or issues with feet ulceration or injury. Is better when compliant with diet and medications. Vision Exam Up to Date:                             Y   2021  Currently on an ACE  :                                Y  Most Recent Hgb A1c <7:                           Y   AIC 6.4   Microalbumin Test Reviewed:                       Y   Foot Exam Up To Date:                               Y 2021  Flu Shot Up To Date:                                   Y 2020  Has patient been seen by Neurology           N   Currently on a Statin                                    Y       HTN: Stable hypertension, controlled on     amLODIPine (NORVASC) 10 MG tablet, Take 10 mg by mouth daily, Disp: , Rfl:    enalapril (VASOTEC) 10 MG tablet, Take 10 mg by mouth daily, Disp: , Rfl:    remains at a mild intensity but overall good control, without symptoms, no ringing in the ears, no headaches and no nose bleeds. Better on medications. CAD and CHF: Patient currently not on a Betablocker but is on an ACE for LVSD. Tobacco Use: N    Flu Shot Up To Date: Y     Meds Listed in Medical Record: Y     Hyperlipidemia: Mild in intensity but controlled on    atorvastatin (LIPITOR) 20 MG tablet, Take 20 mg by mouth daily, Disp: , Rfl:  without symptoms, no complications with dietary treatment regimen reporting no side effects or intolereances. Compliant with treatment and diet. No muscle aches, new joint pains or abd pain. Hypothyroid: Remains controlled, without symptoms, on    levothyroxine (SYNTHROID) 100 MCG tablet, Take 100 mcg by mouth Daily, Disp: , Rfl:   with no evidence of weight gain, palpitations, fatigue, difficulty swallowing, neck prominence or hair loss. Is better on medications and worsens with forgetting to take medications. Has been on medications religiously since last visit. Depression/Anxiety/PBA/GOUT: Stable depression with generalized anxiety. Mild in intensity but well controlled on   traMADol (ULTRAM) 50 MG tablet, Take 1 tablet by mouth 3 times daily for 93 days. , Disp: 93 tablet, Rfl:   fluticasone (FLONASE) 50 MCG/ACT nasal spray, 1 spray by Each Nostril route daily, Disp: , Rfl:   betamethasone dipropionate (DIPROLENE) 0.05 % cream, Apply topically 2 times daily.  PRN for 14 days and then take 7 day break, Disp: 1 Tube, Rfl: 3  Azelastine HCl 137 MCG/SPRAY SOLN, 2 sprays by Nasal route daily In each nostril, Disp: , Rfl:  acetaminophen (TYLENOL) 325 MG tablet, Take 325 mg by mouth 3 times daily , Disp: , Rfl:   allopurinol (ZYLOPRIM) 100 MG tablet, Take 100 mg by mouth daily, Disp: , Rfl:   FLUoxetine (PROZAC) 40 MG capsule, Take 40 mg by mouth daily Take with 20 mg capsuleto equal 60 mg daily, Disp: , Rfl:   oxybutynin (DITROPAN-XL) 5 MG extended release tablet, Take 5 mg by mouth nightly, Disp: , Rfl:   docusate sodium (COLACE) 100 MG capsule, Take 100 mg by mouth 1 CAPSULE EVERY MORNING AND 2 CAPSULES NIGHTLY, Disp: , Rfl:   Aspirin Buf,[id number], (BUFFERED ASPIRIN) 325 MG TABS, Take 1 tablet by mouth daily, Disp: , Rfl:   FLUoxetine (PROZAC) 20 MG capsule, Take 20 mg by mouth daily Take with 40 mg capsule to equal 60mg daily, Disp: , Rfl:   diclofenac sodium (VOLTAREN) 1 % GEL, Apply 4 g topically 4 times daily, Disp: , Rfl:   menthol-zinc oxide (CALMOSEPTINE) 0.44-20.625 % OINT ointment, Apply 1 applicator topically 2 times daily Prn. Max 30 ml per day., Disp: , Rfl:    without symptoms, no weight gain, no increase in anxiety, no suicidal or homicidal ideation's no unexplained fatigue, or relationship difficulties relayed this visit. Review of Systems   Constitutional: Negative for activity change, chills, diaphoresis, fatigue, fever and unexpected weight change. HENT: Negative for trouble swallowing and voice change. Eyes: Negative for visual disturbance. Respiratory: Negative for cough, chest tightness, shortness of breath and wheezing. Cardiovascular: Negative for chest pain, palpitations and leg swelling. Gastrointestinal: Negative for abdominal pain, blood in stool, constipation, diarrhea, nausea and vomiting. Endocrine: Negative for polydipsia, polyphagia and polyuria. Genitourinary: Negative for dysuria, enuresis, frequency and hematuria. Musculoskeletal: Negative for arthralgias, back pain, gait problem, joint swelling, myalgias and neck stiffness. Skin: Negative for rash. Bruising noted along the chest wall no other falls   Neurological: Negative for dizziness, seizures, syncope, facial asymmetry, weakness, light-headedness, numbness and headaches. Hematological: Does not bruise/bleed easily. Psychiatric/Behavioral: Negative for behavioral problems, confusion, hallucinations and suicidal ideas. The patient is not nervous/anxious.           Current Outpatient Medications:     traMADol (ULTRAM) 50 MG tablet, Take 1 tablet by mouth 3 times daily for 30 days. , Disp: 90 tablet, Rfl: 0    zoster recombinant adjuvanted vaccine (SHINGRIX) 50 MCG/0.5ML SUSR injection, Inject 0.5 mLs into the muscle See Admin Instructions 1 dose now and repeat in 2-6 months, Disp: 0.5 mL, Rfl: 0    fluticasone (FLONASE) 50 MCG/ACT nasal spray, 1 spray by Each Nostril route daily, Disp: , Rfl:     OZEMPIC, 0.25 OR 0.5 MG/DOSE, 2 MG/1.5ML SOPN, Inject 0.5 mg into the skin once a week , Disp: , Rfl:     betamethasone dipropionate (DIPROLENE) 0.05 % cream, Apply topically 2 times daily.  PRN for 14 days and then take 7 day break, Disp: 1 Tube, Rfl: 3    Azelastine HCl 137 MCG/SPRAY SOLN, 2 sprays by Nasal route daily In each nostril, Disp: , Rfl:     atorvastatin (LIPITOR) 20 MG tablet, Take 20 mg by mouth daily, Disp: , Rfl:     levothyroxine (SYNTHROID) 100 MCG tablet, Take 100 mcg by mouth Daily, Disp: , Rfl:     acetaminophen (TYLENOL) 325 MG tablet, Take 325 mg by mouth 3 times daily , Disp: , Rfl:     amLODIPine (NORVASC) 10 MG tablet, Take 10 mg by mouth daily, Disp: , Rfl:     enalapril (VASOTEC) 10 MG tablet, Take 10 mg by mouth daily, Disp: , Rfl:     allopurinol (ZYLOPRIM) 100 MG tablet, Take 100 mg by mouth daily, Disp: , Rfl:     FLUoxetine (PROZAC) 40 MG capsule, Take 40 mg by mouth daily Take with 20 mg capsuleto equal 60 mg daily, Disp: , Rfl:     oxybutynin (DITROPAN-XL) 5 MG extended release tablet, Take 5 mg by mouth nightly, Disp: , Rfl:     docusate sodium (COLACE) 100 MG capsule, Take 100 mg by mouth 1 CAPSULE EVERY MORNING AND 2 CAPSULES NIGHTLY, Disp: , Rfl:     Aspirin Buf,[id number]-YoMix, (BUFFERED ASPIRIN) 325 MG TABS, Take 1 tablet by mouth daily, Disp: , Rfl:     FLUoxetine (PROZAC) 20 MG capsule, Take 20 mg by mouth daily Take with 40 mg capsule to equal 60mg daily, Disp: , Rfl:     diclofenac sodium (VOLTAREN) 1 % GEL, Apply 4 g topically 4 times daily, Disp: , Rfl:     menthol-zinc oxide (CALMOSEPTINE) 0.44-20.625 % OINT ointment, Apply 1 applicator topically 2 times daily Prn. Max 30 ml per day., Disp: , Rfl:   No Known Allergies    Past Medical History:   Diagnosis Date    Chronic kidney disease (CKD), stage III (moderate) (HCC)     Diabetes mellitus (HCC)     DJD (degenerative joint disease)     Facies suggestive of Down syndrome     Hearing impaired     Hyperlipidemia     Hypertension     Hypothyroid     Mental deficiency     Urge urinary incontinence      Past Surgical History:   Procedure Laterality Date    COLONOSCOPY      COLONOSCOPY N/A 8/11/2021    COLONOSCOPY DIAGNOSTIC performed by Destinee Montes DO at [facility] ENDOSCOPY     History reviewed. No pertinent family history. Social History     Socioeconomic History    Marital status: Single     Spouse name: Not on file    Number of children: Not on file    Years of education: Not on file    Highest education level: Not on file   Occupational History    Not on file   Tobacco Use    Smoking status: Never Smoker    Smokeless tobacco: Never Used   Vaping Use    Vaping Use: Never used   Substance and Sexual Activity    Alcohol use: Never    Drug use: Never    Sexual activity: Not on file   Other Topics Concern    Not on file   Social History Narrative    Not on file     Social Determinants of Health     Financial Resource Strain: Low Risk     Difficulty of Paying Living Expenses: Not hard at all   Food Insecurity: No Food Insecurity    Worried About Running Out of Food in the Last Year: Never true    [address] in the Last Year: Never true   Transportation Needs:     Lack of Transportation (Medical): Not on file    Lack of Transportation (Non-Medical):  Not on file   Physical Activity:     Days of Exercise per Week: Not on file    Minutes of Exercise per Session: Not on file   Stress:     Feeling of Stress : Not on file   Social Connections:     Frequency of Communication with Friends and Family: Not on file    Frequency of Social Gatherings with Friends and Family: Not on file    Attends Muslim Services: Not on file    Active Member of Clubs or Organizations: Not on file    Attends Club or Organization Meetings: Not on file    Marital Status: Not on file   Intimate Partner Violence:     Fear of Current or Ex-Partner: Not on file    Emotionally Abused: Not on file    Physically Abused: Not on file    Sexually Abused: Not on file   Housing Stability:     Unable to Pay for Housing in the Last Year: Not on file    Number of [place] in the Last Year: Not on file    Unstable Housing in the Last Year: Not on file       Vitals:    03/22/22 1319   BP: 128/80   Site: Left Upper Arm   Position: Sitting   Cuff Size: Medium Adult   Pulse: 117   Temp: 97.3 °F (36.3 °C)   TempSrc: Temporal   SpO2: 100%       Physical Exam  Vitals and nursing note reviewed. Constitutional:       Appearance: Normal appearance. HENT:      Head: Normocephalic. Right Ear: Tympanic membrane and ear canal normal. There is no impacted cerumen. Left Ear: Tympanic membrane and ear canal normal. There is no impacted cerumen. Nose: Nose normal.      Mouth/Throat:      Mouth: Mucous membranes are dry. Eyes:      Extraocular Movements: Extraocular movements intact. Pupils: Pupils are equal, round, and reactive to light. Neck:      Vascular: No carotid bruit. Cardiovascular:      Rate and Rhythm: Normal rate and regular rhythm. Pulses: Normal pulses. Heart sounds: Normal heart sounds. No murmur heard. No friction rub. No gallop. Pulmonary:      Effort: Pulmonary effort is normal. No respiratory distress. Breath sounds: Normal breath sounds. No stridor. No wheezing, rhonchi or rales. Chest:      Chest wall: No tenderness. Abdominal:      General: Bowel sounds are normal. There is no distension. Palpations: Abdomen is soft. Musculoskeletal:         General: No swelling, tenderness, deformity or signs of injury. Cervical back: No rigidity. No muscular tenderness. Right lower leg: No edema. Left lower leg: No edema. Comments: Chest with a bruise about 14 cm X 4 cm midline no tenderness no signs of infection. Lymphadenopathy:      Cervical: No cervical adenopathy. Skin:     General: Skin is warm and dry. Capillary Refill: Capillary refill takes 2 to 3 seconds. Findings: No bruising, lesion or rash. Neurological:      General: No focal deficit present. Mental Status: He is alert and oriented to person, place, and time. Motor: No weakness. Gait: Gait normal.   Psychiatric:         Attention and Perception: Attention normal.         Mood and Affect: Mood normal.         Behavior: Behavior normal.         Thought Content: Thought content does not include homicidal or suicidal ideation. Thought content does not include homicidal or suicidal plan. Visual inspection:  Deformity/amputation: absent  Skin lesions/pre-ulcerative calluses: absent  Edema: right- negative, left- negative    Sensory exam:  Monofilament sensation: normal  (minimum of 5 random plantar locations tested, avoiding callused areas - > 1 area with absence of sensation is + for neuropathy)    Plus at least one of the following:  Pulses: normal,   Pinprick: Intact       Seen By:  DARREN Morton CNP  On the basis of positive falls risk screening, assessment and plan is as follows: home safety tips provided.

## 2022-03-22 NOTE — PATIENT INSTRUCTIONS
Patient Education        Home Blood Pressure Test: About This Test  What is it? A home blood pressure test allows you to keep track of your blood pressure at home. Blood pressure is a measure of the force of blood against the walls of your arteries. Blood pressure readings include two numbers, such as 130/80 (say \"130 over 80\"). The first number is the systolic pressure. The second number is the diastolic pressure. Why is this test done? You may do this test at home to:  · Find out if you have high blood pressure. · Track your blood pressure if you have high blood pressure. · Track how well medicine is working to reduce high blood pressure. · Check how lifestyle changes, such as weight loss and exercise, are affecting blood pressure. How do you prepare for the test?  For at least 30 minutes before you take your blood pressure, don't exercise, drink caffeine, or smoke. Empty your bladder before the test. Sit quietly with your back straight and both feet on the floor for at least 5 minutes. This helps you take your blood pressure while you feel comfortable and relaxed. How is the test done? · If your doctor recommends it, take your blood pressure twice a day. Take it in the morning and evening. · Sit with your arm slightly bent and resting on a table so that your upper arm is at the same level as your heart. · Use the same arm each time you take your blood pressure. · Place the blood pressure cuff on the bare skin of your upper arm. You may have to roll up your sleeve, remove your arm from the sleeve, or take your shirt off. · Wrap the blood pressure cuff around your upper arm so that the lower edge of the cuff is about 1 inch above the bend of your elbow. · Do not move, talk, or text while you take your blood pressure. Follow the instructions that came with your blood pressure monitor. They might be different from the following. · Press the on/off button on the automatic monitor.  Then you may need to wait until the screen says the monitor is ready. · Press the start button. The cuff will inflate and deflate by itself. · Your blood pressure numbers will appear on the screen. · Wait one minute and take your blood pressure again. · If your monitor does not automatically save your numbers, write them in your log book, along with the date and time. Follow-up care is a key part of your treatment and safety. Be sure to make and go to all appointments, and call your doctor if you are having problems. It's also a good idea to keep a list of the medicines you take. Where can you learn more? Go to https://[email or web address] [email or web address].[email or web address] org and sign in to your Pre[employer] account. Enter C427 in the [employer] box to learn more about \"Home Blood Pressure Test: About This Test.\"     If you do not have an account, please click on the \"Sign Up Now\" link. Current as of: April 29, 2021               Content Version: 13.1  © 1004-2379 Healthwise, [employer]. Care instructions adapted under license by [facility] ([facility]). If you have questions about a medical condition or this instruction, always ask your healthcare professional. [place] [address] any warranty or liability for your use of this information. Patient Education        Learning About Carbohydrate (Carb) Counting and Eating Out When You Have Diabetes  Why plan your meals? Meal planning can be a key part of managing diabetes. Planning meals and snacks with the right balance of carbohydrate, protein, and fat can help you keep your blood sugar at the target level you set with your doctor. You don't have to eat special foods. You can eat what your family eats, including sweets once in a while. But you do have to pay attention to how often you eat and how much you eat of certain foods. You may want to work with a dietitian or a certified diabetes educator.  He or she can give you tips and meal ideas and can answer your questions about meal planning. This health professional can also help you reach a healthy weight if that is one of your goals. What should you know about eating carbs? Managing the amount of carbohydrate (carbs) you eat is an important part of healthy meals when you have diabetes. Carbohydrate is found in many foods. · Learn which foods have carbs. And learn the amounts of carbs in different foods. ? Bread, cereal, pasta, and rice have about 15 grams of carbs in a serving. A serving is 1 slice of bread (1 ounce), ½ cup of cooked cereal, or 1/3 cup of cooked pasta or rice. ? Fruits have 15 grams of carbs in a serving. A serving is 1 small fresh fruit, such as an apple or orange; ½ of a banana; ½ cup of cooked or canned fruit; ½ cup of fruit juice; 1 cup of melon or raspberries; or 2 tablespoons of dried fruit. ? Milk and no-sugar-added yogurt have 15 grams of carbs in a serving. A serving is 1 cup of milk or 2/3 cup of no-sugar-added yogurt. ? Starchy vegetables have 15 grams of carbs in a serving. A serving is ½ cup of mashed potatoes or sweet potato; 1 cup winter squash; ½ of a small baked potato; ½ cup of cooked beans; or ½ cup cooked corn or green peas. · Learn how much carbs to eat each day and at each meal. A dietitian or CDE can teach you how to keep track of the amount of carbs you eat. This is called carbohydrate counting. · If you are not sure how to count carbohydrate grams, use the Plate Method to plan meals. It is a good, quick way to make sure that you have a balanced meal. It also helps you spread carbs throughout the day. ? Divide your plate by types of foods. Put non-starchy vegetables on half the plate, meat or other protein food on one-quarter of the plate, and a grain or starchy vegetable in the final quarter of the plate.  To this you can add a small piece of fruit and 1 cup of milk or yogurt, depending on how many carbs you are supposed to eat at a meal.  · Try to eat about the same amount of carbs at each meal. Do not \"save up\" your daily allowance of carbs to eat at one meal.  · Proteins have very little or no carbs per serving. Examples of proteins are beef, chicken, turkey, fish, eggs, tofu, cheese, cottage cheese, and peanut butter. A serving size of meat is 3 ounces, which is about the size of a deck of cards. Examples of meat substitute serving sizes (equal to 1 ounce of meat) are 1/4 cup of cottage cheese, 1 egg, 1 tablespoon of peanut butter, and ½ cup of tofu. How can you eat out and still eat healthy? · Learn to estimate the serving sizes of foods that have carbohydrate. If you measure food at home, it will be easier to estimate the amount in a serving of restaurant food. · If the meal you order has too much carbohydrate (such as potatoes, corn, or baked beans), ask to have a low-carbohydrate food instead. Ask for a salad or green vegetables. · If you use insulin, check your blood sugar before and after eating out to help you plan how much to eat in the future. · If you eat more carbohydrate at a meal than you had planned, take a walk or do other exercise. This will help lower your blood sugar. What are some tips for eating healthy? · Limit saturated fat, such as the fat from meat and dairy products. This is a healthy choice because people who have diabetes are at higher risk of heart disease. So choose lean cuts of meat and nonfat or low-fat dairy products. Use olive or canola oil instead of butter or shortening when cooking. · Don't skip meals. Your blood sugar may drop too low if you skip meals and take insulin or certain medicines for diabetes. · Check with your doctor before you drink alcohol. Alcohol can cause your blood sugar to drop too low. Alcohol can also cause a bad reaction if you take certain diabetes medicines. Follow-up care is a key part of your treatment and safety.  Be sure to make and go to all appointments, and call your doctor if you are having problems. It's also a good idea to know your test results and keep a list of the medicines you take. Where can you learn more? Go to https://[email or web address] org and sign in to your [employer] account. Enter M316 in the [facility] box to learn more about \"Learning About Carbohydrate (Carb) Counting and Eating Out When You Have Diabetes. \"     If you do not have an account, please click on the \"Sign Up Now\" link. Current as of: September 8, 2021               Content Version: 13.1  © 2006-2021 [employer]. Care instructions adapted under license by [facility] ([facility]). If you have questions about a medical condition or this instruction, always ask your healthcare professional. [address] any warranty or liability for your use of this information. Patient Education        Diabetes Foot Health: Care Instructions  Your Care Instructions     When you have diabetes, your feet need extra care and attention. Diabetes can damage the nerve endings and blood vessels in your feet, making you less likely to notice when your feet are injured. Diabetes also limits your body's ability to fight infection and get blood to areas that need it. If you get a minor foot injury, it could become an ulcer or a serious infection. With good foot care, you can prevent most of these problems. Caring for your feet can be quick and easy. Most of the care can be done when you are bathing or getting ready for bed. Follow-up care is a key part of your treatment and safety. Be sure to make and go to all appointments, and call your doctor if you are having problems. It's also a good idea to know your test results and keep a list of the medicines you take. How can you care for yourself at home? · Keep your blood sugar close to normal by watching what and how much you eat, monitoring blood sugar, taking medicines if prescribed, and getting regular exercise. · Do not smoke.  Smoking affects blood flow and can make foot problems worse. If you need help quitting, talk to your doctor about stop-smoking programs and medicines. These can increase your chances of quitting for good. · Eat a diet that is low in fats. High fat intake can cause fat to build up in your blood vessels and decrease blood flow. · Inspect your feet daily for blisters, cuts, cracks, or sores. If you cannot see well, use a mirror or have someone help you. · Take care of your feet:  ? Wash your feet every day. Use warm (not hot) water. Check the water temperature with your wrists or other part of your body, not your feet. ? Dry your feet well. Pat them dry. Do not rub the skin on your feet too hard. Dry well between your toes. If the skin on your feet stays moist, bacteria or a fungus can grow, which can lead to infection. ? Keep your skin soft. Use moisturizing skin cream to keep the skin on your feet soft and prevent calluses and cracks. But do not put the cream between your toes, and stop using any cream that causes a rash. ? Clean underneath your toenails carefully. Do not use a sharp object to clean underneath your toenails. Use the blunt end of a nail file or other rounded tool. ? Trim and file your toenails straight across to prevent ingrown toenails. Use a nail clipper, not scissors. Use an emery board to smooth the edges. · Change socks daily. Socks without seams are best, because seams often rub the feet. You can find socks for people with diabetes from specialty catalogs. · Look inside your shoes every day for things like gravel or torn linings, which could cause blisters or sores. · Buy shoes that fit well:  ? Look for shoes that have plenty of space around the toes. This helps prevent bunions and blisters. ? Try on shoes while wearing the kind of socks you will usually wear with the shoes. ? Avoid plastic shoes. They may rub your feet and cause blisters.  Good shoes should be made of materials that are flexible and breathable, such as leather or cloth. ? Break in new shoes slowly by wearing them for no more than an hour a day for several days. Take extra time to check your feet for red areas, blisters, or other problems after you wear new shoes. · Do not go barefoot. Do not wear sandals, and do not wear shoes with very thin soles. Thin soles are easy to puncture. They also do not protect your feet from hot pavement or cold weather. · Have your doctor check your feet during each visit. If you have a foot problem, see your doctor. Do not try to treat an early foot problem at home. Home remedies or treatments that you can buy without a prescription (such as corn removers) can be harmful. · Always get early treatment for foot problems. A minor irritation can lead to a major problem if not properly cared for early. When should you call for help? Call your doctor now or seek immediate medical care if:    · You have a foot sore, an ulcer or break in the skin that is not healing after 4 days, bleeding corns or calluses, or an ingrown toenail.     · You have blue or black areas, which can mean bruising or blood flow problems.     · You have peeling skin or tiny blisters between your toes or cracking or oozing of the skin.     · You have a fever for more than 24 hours and a foot sore.     · You have new numbness or tingling in your feet that does not go away after you move your feet or change positions.     · You have unexplained or unusual swelling of the foot or ankle. Watch closely for changes in your health, and be sure to contact your doctor if:    · You cannot do proper foot care. Where can you learn more? Go to https://Contract Cloudfrannie.healthAdECN. org and sign in to your [employer] account. Enter H749 in the [employer] box to learn more about \"Diabetes Foot Health: Care Instructions. \"     If you do not have an account, please click on the \"Sign Up Now\" link.   Current as of: July 28, 2021               Content Version: 13.1  © 5213-6818 Healthwise, Inc[employer]. Care instructions adapted under license by [facility] ([facility]). If you have questions about a medical condition or this instruction, always ask your healthcare professional. [address] any warranty or liability for your use of this information.

## 2022-03-24 ENCOUNTER — TELEPHONE (OUTPATIENT)
Dept: FAMILY MEDICINE CLINIC | Age: 77
End: 2022-03-24

## 2022-03-24 DIAGNOSIS — R31.9 HEMATURIA, UNSPECIFIED TYPE: Primary | ICD-10-CM

## 2022-03-24 NOTE — TELEPHONE ENCOUNTER
----- Message from DARREN Myles CNP sent at 3/24/2022  9:27 AM EDT -----  Please let her know that is a good idea but he may need to get scoped Please see if the Urine can be cultured and if so do and pend the order if not then get another urine and pend the order for UA with a C& S to see if the blood was isolated event

## 2022-03-25 ENCOUNTER — OFFICE VISIT (OUTPATIENT)
Dept: ORTHOPEDIC SURGERY | Age: 77
End: 2022-03-25
Payer: MEDICARE

## 2022-03-25 VITALS — HEIGHT: 65 IN | WEIGHT: 234 LBS | BODY MASS INDEX: 38.99 KG/M2

## 2022-03-25 DIAGNOSIS — G56.01 CARPAL TUNNEL SYNDROME OF RIGHT WRIST: Primary | ICD-10-CM

## 2022-03-25 DIAGNOSIS — M19.031 ARTHRITIS OF RIGHT WRIST: ICD-10-CM

## 2022-03-25 PROCEDURE — 20526 THER INJECTION CARP TUNNEL: CPT | Performed by: PHYSICIAN ASSISTANT

## 2022-03-25 PROCEDURE — 20605 DRAIN/INJ JOINT/BURSA W/O US: CPT | Performed by: PHYSICIAN ASSISTANT

## 2022-03-25 RX ORDER — BETAMETHASONE SODIUM PHOSPHATE AND BETAMETHASONE ACETATE 3; 3 MG/ML; MG/ML
12 INJECTION, SUSPENSION INTRA-ARTICULAR; INTRALESIONAL; INTRAMUSCULAR; SOFT TISSUE ONCE
Status: COMPLETED | OUTPATIENT
Start: 2022-03-25 | End: 2022-03-25

## 2022-03-25 RX ADMIN — BETAMETHASONE SODIUM PHOSPHATE AND BETAMETHASONE ACETATE 12 MG: 3; 3 INJECTION, SUSPENSION INTRA-ARTICULAR; INTRALESIONAL; INTRAMUSCULAR; SOFT TISSUE at 11:40

## 2022-03-25 NOTE — PROGRESS NOTES
Department of Orthopedic Surgery  History and Physical      CHIEF COMPLAINT:  Follow up injections    HISTORY OF PRESENT ILLNESS:                The patient is a 77 y.o. male who presents with right wrist pain. Patient at times inconsistent with history, caretaker in room and helped with providing history. Pt states he has had multiple falls since July and has had ongoing wrist pain since. Pain is mostly located to the dorsal aspect of the thumb and wrist. He does not some occasional numbness and tingling into the hand. Pain is made worse with extension of the wrist and supporting his weight on his walker. Denies any prior injury to the wrist.     Patient had an EMG/NCS dated 12/14/21    Right median nerve motor latency: 5.99  Right ulnar nerve velocity: 43 below the elbow 30 above the elbow  Right median nerve sensory latency: 4.84    EMG portion of the exam demonstrates decreased recruitment pattern in the right triceps brachii. This is consistent with moderate right carpal tunnel syndrome along with chronic right C7 and C8 radiculopathy and sensory motor polyneuropathy    Patient previously had a cortisone injection to the right carpal tunnel and right dorsal wrist for arthritis. He states this did help his symptoms. He states he does have return of numbness and tingling to the hand and pain to the back of the wrist.  He is requesting repeat injections at today's visit along with his caregiver. He is uninterested in surgical management.       Past Medical History:        Diagnosis Date    Chronic kidney disease (CKD), stage III (moderate) (HCC)     Diabetes mellitus (HCC)     DJD (degenerative joint disease)     Facies suggestive of Down syndrome     Hearing impaired     Hyperlipidemia     Hypertension     Hypothyroid     Mental deficiency     Urge urinary incontinence      Past Surgical History:        Procedure Laterality Date    COLONOSCOPY      COLONOSCOPY N/A 8/11/2021    COLONOSCOPY DIAGNOSTIC performed by Sho Hennessy DO at [facility] ENDOSCOPY     Current Medications:   No current facility-administered medications for this visit. Allergies:  Patient has no known allergies. Social History:   TOBACCO:   reports that he has never smoked. He has never used smokeless tobacco.  ETOH:   reports no history of alcohol use. DRUGS:   reports no history of drug use. ACTIVITIES OF DAILY LIVING:    OCCUPATION:    Family History:   History reviewed. No pertinent family history. REVIEW OF SYSTEMS:  CONSTITUTIONAL:  negative  HEENT:  negative  RESPIRATORY:  negative  CARDIOVASCULAR:  negative  MUSCULOSKELETAL:  Right wrist pain  NEUROLOGICAL:  negative  BEHAVIOR/PSYCH:  negative    PHYSICAL EXAM:    VITALS:  There were no vitals taken for this visit. CONSTITUTIONAL:  awake, alert, cooperative, no apparent distress, and appears stated age  EYES:  sclera clear, conjunctiva normal  ENT:  Normocephalic, without obvious abnormality, atraumatic  NEUROLOGIC:  Awake, alert, oriented to name, place and time. Cranial nerves II-XII are grossly intact. Motor is 5 out of 5 bilaterally. Sensory is intact.  gait is normal.  MUSCULOSKELETAL:    RUE: Chronic ulnar deviated deformity to the wrist. Wrist extension to 15, flexion to 45, radial deviation 0, ulnar deviation 10. TTP about the dorsal lunate bossing. Pain with axial load to thumb CMC. Positive Tinel's and micah's. Sensation intact to entire hand. AIN, PIN and ulnar motor function intact to hand.      DATA:    CBC:   Lab Results   Component Value Date    WBC 10.9 03/10/2022    RBC 4.34 03/10/2022    HGB 14.3 03/10/2022    HCT 45.1 03/10/2022    .9 03/10/2022    MCH 32.9 03/10/2022    MCHC 31.7 03/10/2022    RDW 13.7 03/10/2022     03/10/2022    MPV 9.8 03/10/2022     PT/INR:  No results found for: PROTIME, INR      IMPRESSION:  · Right SLAC wrist  · Right carpal tunnel syndrome  · Sensorimotor polyneuropathy  · Right C7-C8 chronic radiculopathy  · Chronic kidney disease  · Mental deficiency  · Diabetes      PLAN:  Discussed findings with patient and his caregiver. Patient and caregiver requesting repeat injections at today's visit. Repeat injection was provided to the right carpal tunnel and right dorsal wrist.  Briefly discussed surgical options in the future if necessary. Patient is happy with his response to injections. Follow-up every 3 months as needed. All questions answered    Procedure Note Carpal Tunnel Injection    The right carpal tunnel was identified as the injection site. The risk and benefits of a cortisone injection were explained and the patient consented to the injection. Under sterile conditions, the carpal canal was injected with a mixture of 1 mL of 1% Lidocaine and 1 mL of 6 mg/mL Betamethasone without complication. A sterile bandage was applied. Procedure Note Wrist Cortisone Injection    The right wrist was identified as the injection site. The risk and benefits of a cortisone injection were explained and the patient consented to the injection. Under sterile conditions, the wrist was injected with a mixture of 1 mL of 1% Lidocaine and 1 mL of 6 mg/mL Betamethasone without complication. A sterile bandage was applied.

## 2022-03-29 DIAGNOSIS — R31.9 HEMATURIA, UNSPECIFIED TYPE: ICD-10-CM

## 2022-03-29 LAB
BACTERIA: ABNORMAL /HPF
BILIRUBIN URINE: NEGATIVE
BLOOD, URINE: NEGATIVE
CLARITY: ABNORMAL
COLOR: YELLOW
GLUCOSE URINE: NEGATIVE MG/DL
KETONES, URINE: NEGATIVE MG/DL
LEUKOCYTE ESTERASE, URINE: ABNORMAL
NITRITE, URINE: NEGATIVE
PH UA: 6.5 (ref 5–9)
PROTEIN UA: NEGATIVE MG/DL
RBC UA: ABNORMAL /HPF (ref 0–2)
SPECIFIC GRAVITY UA: 1.01 (ref 1–1.03)
UROBILINOGEN, URINE: 1 E.U./DL
WBC UA: ABNORMAL /HPF (ref 0–5)

## 2022-03-31 LAB — URINE CULTURE, ROUTINE: NORMAL

## 2022-04-08 DIAGNOSIS — M15.9 PRIMARY OSTEOARTHRITIS INVOLVING MULTIPLE JOINTS: ICD-10-CM

## 2022-04-08 RX ORDER — TRAMADOL HYDROCHLORIDE 50 MG/1
50 TABLET ORAL 3 TIMES DAILY
Qty: 90 TABLET | Refills: 0 | Status: SHIPPED
Start: 2022-04-08 | End: 2022-05-19 | Stop reason: SDUPTHER

## 2022-05-06 ENCOUNTER — TELEPHONE (OUTPATIENT)
Dept: FAMILY MEDICINE CLINIC | Age: 77
End: 2022-05-06

## 2022-05-19 DIAGNOSIS — M15.9 PRIMARY OSTEOARTHRITIS INVOLVING MULTIPLE JOINTS: ICD-10-CM

## 2022-05-19 RX ORDER — TRAMADOL HYDROCHLORIDE 50 MG/1
50 TABLET ORAL 3 TIMES DAILY
Qty: 90 TABLET | Refills: 0 | Status: SHIPPED
Start: 2022-05-19 | End: 2022-06-15 | Stop reason: SDUPTHER

## 2022-06-01 ENCOUNTER — TELEMEDICINE (OUTPATIENT)
Dept: FAMILY MEDICINE CLINIC | Age: 77
End: 2022-06-01
Payer: MEDICARE

## 2022-06-01 DIAGNOSIS — R01.1 CARDIAC MURMUR, UNSPECIFIED: ICD-10-CM

## 2022-06-01 DIAGNOSIS — E55.9 VITAMIN D DEFICIENCY: ICD-10-CM

## 2022-06-01 DIAGNOSIS — Q90.9: ICD-10-CM

## 2022-06-01 DIAGNOSIS — N18.31 STAGE 3A CHRONIC KIDNEY DISEASE (HCC): ICD-10-CM

## 2022-06-01 DIAGNOSIS — M15.9 PRIMARY OSTEOARTHRITIS INVOLVING MULTIPLE JOINTS: ICD-10-CM

## 2022-06-01 DIAGNOSIS — E78.5 HYPERLIPIDEMIA, UNSPECIFIED HYPERLIPIDEMIA TYPE: ICD-10-CM

## 2022-06-01 DIAGNOSIS — F79 MENTAL DEFICIENCY: ICD-10-CM

## 2022-06-01 DIAGNOSIS — Z91.81 AT HIGH RISK FOR FALLS: Primary | ICD-10-CM

## 2022-06-01 DIAGNOSIS — I10 ESSENTIAL HYPERTENSION, MALIGNANT: ICD-10-CM

## 2022-06-01 DIAGNOSIS — R26.9 UNSPECIFIED ABNORMALITIES OF GAIT AND MOBILITY: ICD-10-CM

## 2022-06-01 PROBLEM — N18.30 CHRONIC RENAL DISEASE, STAGE III (HCC): Status: ACTIVE | Noted: 2022-06-01

## 2022-06-01 PROCEDURE — 3017F COLORECTAL CA SCREEN DOC REV: CPT | Performed by: NURSE PRACTITIONER

## 2022-06-01 PROCEDURE — 99213 OFFICE O/P EST LOW 20 MIN: CPT | Performed by: NURSE PRACTITIONER

## 2022-06-01 PROCEDURE — 1036F TOBACCO NON-USER: CPT | Performed by: NURSE PRACTITIONER

## 2022-06-01 PROCEDURE — 1123F ACP DISCUSS/DSCN MKR DOCD: CPT | Performed by: NURSE PRACTITIONER

## 2022-06-01 PROCEDURE — G8417 CALC BMI ABV UP PARAM F/U: HCPCS | Performed by: NURSE PRACTITIONER

## 2022-06-01 PROCEDURE — G8427 DOCREV CUR MEDS BY ELIG CLIN: HCPCS | Performed by: NURSE PRACTITIONER

## 2022-06-01 ASSESSMENT — ENCOUNTER SYMPTOMS
NAUSEA: 0
BACK PAIN: 0
ABDOMINAL PAIN: 0
CHEST TIGHTNESS: 0
COUGH: 0
VOMITING: 0
WHEEZING: 0
TROUBLE SWALLOWING: 0
BLOOD IN STOOL: 0
VOICE CHANGE: 0
DIARRHEA: 0
SHORTNESS OF BREATH: 0
CONSTIPATION: 0

## 2022-06-01 ASSESSMENT — PATIENT HEALTH QUESTIONNAIRE - PHQ9
SUM OF ALL RESPONSES TO PHQ QUESTIONS 1-9: 0
2. FEELING DOWN, DEPRESSED OR HOPELESS: 0
SUM OF ALL RESPONSES TO PHQ9 QUESTIONS 1 & 2: 0
1. LITTLE INTEREST OR PLEASURE IN DOING THINGS: 0
SUM OF ALL RESPONSES TO PHQ QUESTIONS 1-9: 0

## 2022-06-01 NOTE — PATIENT INSTRUCTIONS
Patient Education        Preventing Falls: Care Instructions  Your Care Instructions     Getting around your home safely can be a challenge if you have injuries or health problems that make it easy for you to fall. Loose rugs and furniture in walkways are among the dangers for many older people who have problems walking or who have poor eyesight. People who have conditions such as arthritis,osteoporosis, or dementia also have to be careful not to fall. You can make your home safer with a few simple measures. Follow-up care is a key part of your treatment and safety. Be sure to make and go to all appointments, and call your doctor if you are having problems. It's also a good idea to know your test results and keep alist of the medicines you take. How can you care for yourself at home? Taking care of yourself   Exercise regularly to improve your strength, muscle tone, and balance. Walk if you can. Swimming may be a good choice if you cannot walk easily.  Have your vision and hearing checked each year or any time you notice a change. If you have trouble seeing and hearing, you might not be able to avoid objects and could lose your balance.  Know the side effects of the medicines you take. Ask your doctor or pharmacist whether the medicines you take can affect your balance. Sleeping pills or sedatives can affect your balance.  Limit the amount of alcohol you drink. Alcohol can impair your balance and other senses.  Ask your doctor whether calluses or corns on your feet need to be removed. If you wear loose-fitting shoes because of calluses or corns, you can lose your balance and fall.  Talk to your doctor if you have numbness in your feet.  You may get dizzy if you do not drink enough water. To prevent dehydration, drink plenty of fluids. Choose water and other clear liquids.  If you have kidney, heart, or liver disease and have to limit fluids, talk with your doctor before you increase the amount of fluids you drink. Preventing falls at home   Remove raised doorway thresholds, throw rugs, and clutter. Repair loose carpet or raised areas in the floor. [facility] furniture and electrical cords to keep them out of walking paths.  Use nonskid floor wax, and wipe up spills right away, especially on ceramic tile floors.  If you use a walker or cane, put rubber tips on it. If you use crutches, clean the bottoms of them regularly with an abrasive pad, such as steel wool.  Keep your house well lit, especially Ronnald Och[place], and outside walkways. Use night-lights in areas such as hallways and bathrooms. Add extra light switches or use remote switches (such as switches that go on or off when you clap your hands) to make it easier to turn lights on if you have to get up during the night.  Install sturdy handrails on stairways.  Move items in your cabinets so that the things you use a lot are on the lower shelves (about waist level).  Keep a cordless phone and a flashlight with new batteries by your bed. If possible, put a phone in each of the main rooms of your house, or carry a cell phone in case you fall and cannot reach a phone. Or, you can wear a device around your neck or wrist. You push a button that sends a signal for help.  Wear low-heeled shoes that fit well and give your feet good support. Use footwear with nonskid soles. Check the heels and soles of your shoes for wear. Repair or replace worn heels or soles.  Do not wear socks without shoes on wood floors.  Walk on the grass when the sidewalks are slippery. If you live in an area that gets snow and ice in the winter, sprinkle salt on slippery steps and sidewalks. Or ask a family member or friend to do this for you. Preventing falls in the bath   Install grab bars and nonskid mats inside and outside your shower or tub and near the toilet and sinks.  Use shower chairs and bath benches.    Use a hand-held shower head that will allow you to sit while showering.  Get into a tub or shower by putting the weaker leg in first. Get out of a tub or shower with your strong side first.   Repair loose toilet seats and consider installing a raised toilet seat to make getting on and off the toilet easier.  Keep your bathroom door unlocked while you are in the shower. Where can you learn more? Go to https://chpepiceweb.[employer]. org and sign in to your [employer] account. Enter [id number] [id number] in the [facility] box to learn more about \"Preventing Falls: Care Instructions. \"     If you do not have an account, please click on the \"Sign Up Now\" link. Current as of: September 8, 2021               Content Version: 13.2  © 2006-2022 [employer]. Care instructions adapted under license by [facility] ([facility]). If you have questions about a medical condition or this instruction, always ask your healthcare professional. [place] [address] any warranty or liability for your use of this information. Patient Education        Preventing Outdoor Falls: Care Instructions  Your Care Instructions     Worries about falls don't need to keep you indoors. Outdoor activities like walking have big benefits for your health. You will need to watch your step andlearn a few safety measures. If you are worried about having a fall outdoors, ask your doctor about exercises, classes, or physical therapy that may help. You can learn ways to gain strength, flexibility, and balance. Ask if it might help to use a cane orwalker. You can make your time outdoors safer with a few simple measures. Follow-up care is a key part of your treatment and safety. Be sure to make and go to all appointments, and call your doctor if you are having problems. It's also a good idea to know your test results and keep alist of the medicines you take. How can you prevent falls outdoors?  Wear shoes with firm soles and low heels.  If you have to walk on an icy surface, use grippers that can be worn over your shoes in bad weather.  Be extra careful if weather is bad. Walk on the grass when the sidewalks are slick. If you live in a place that gets snow and ice in the winter, sprinkle salt on slippery stairs and sidewalks.  Be careful getting on or off buses and trains or getting in and out of cars. If handrails are available, use them.  Be careful when you cross the street. Look for crosswalks or places where curb cuts or ramps are present.  Try not to hurry, especially if you are carrying something.  Be cautious in parking lots or garages. There may be curbs or changes in pavement, or the height of the pavement may vary.  Make sure to wear the correct eyeglasses, if you need them. Reading glasses or bifocals can make it harder to see hazards that might be in your way.  If you are walking outdoors for exercise, try to:  ? Walk in well-lighted, well-maintained areas. These include high school or college tracks, shopping malls, and public spaces. ? Walk with a partner. ? Watch out for cracked sidewalks, curbs, changes in the height of the pavement, exposed tree roots, and debris such as fallen leaves or branches. Where can you learn more? Go to https://[employer]. org and sign in to your [employer] account. Enter S916 in the [facility] box to learn more about \"Preventing Outdoor Falls: Care Instructions. \"     If you do not have an account, please click on the \"Sign Up Now\" link. Current as of: September 8, 2021               Content Version: 13.2  © 9706-1270 [employer]. Care instructions adapted under license by [facility] ([facility]). If you have questions about a medical condition or this instruction, always ask your healthcare professional. [address] any warranty or liability for your use of this information.          Patient Education        How to Get Up Safely After a Fall: Care Instructions  Your Care within reach, try to slide yourself toward it. If you cannot get to the phone, try to slide toward a door or window or a place where you think you can be heard. 5. [place] or use an object to make noise so someone might hear you. 6. If you can reach something that you can use for a pillow, place it under your head. Try to stay warm by covering yourself with a blanket or clothing while you wait for help. When should you call for help? Call 911 anytime you think you may need emergency care. For example, call if:     You passed out (lost consciousness).      You cannot get up after a fall.      You have severe pain. Call your doctor now or seek immediate medical care if:     You have new or worse pain.      You are dizzy or lightheaded.      You hit your head. Watch closely for changes in your health, and be sure to contact your doctor if:     You do not get better as expected. Where can you learn more? Go to https://[employer] AlliancepeNeedcheckeb."[employer]. org and sign in to your [employer] account. Enter E649 in the [employer] box to learn more about \"How to Get Up Safely After a Fall: Care Instructions. \"     If you do not have an account, please click on the \"Sign Up Now\" link. Current as of: September 8, 2021               Content Version: 13.2  © 8744-6711 [employer]. Care instructions adapted under license by [facility] ([facility]). If you have questions about a medical condition or this instruction, always ask your healthcare professional. [place] [address] any warranty or liability for your use of this information.

## 2022-06-01 NOTE — PROGRESS NOTES
Lynnette Home : 1945 Sex: male  Age: 77 y.o. Chief Complaint   Patient presents with    Other     Face to Face for wheelchair , [address]:    Silvestre Live was seen today for other. Diagnoses and all orders for this visit:    At high risk for falls  -     DME Order for Wheel Chair as OP    Stage 3a chronic kidney disease ([facility] [address]  -     DME Order for Wheel Chair as OP    Primary osteoarthritis involving multiple joints  -     DME Order for Wheel Chair as OP    Hyperlipidemia, unspecified hyperlipidemia type    Essential hypertension, malignant  -     DME Order for Wheel Chair as OP    Vitamin D deficiency    Cardiac murmur, unspecified  -     DME Order for Wheel Chair as OP    Facies suggestive of Down syndrome  -     DME Order for Wheel Chair as OP    Mental deficiency  -     DME Order for Wheel Chair as OP    Unspecified abnormalities of gait and mobility  -     DME Order for Wheel Chair as OP        USPTF:    () Colorectal Cancer: Screening --Adults aged 48 to 76 years  Grade: A (Recommended) recommends screening for colorectal cancer starting at age 48 years and continuing until age 76 years. (Non reactive) HIV: Screening - Adolescents and Adults  Grade: A (Recommended) recommends that clinicians screen for HIV infection in ages 13 to 72 years. (B/P 128/80) High Blood Pressure: Screening and Home Monitoring -- Adults  Grade: A (Recommended) recommends screening for high blood pressure in ages 25 years or older. obtain measurements outside of the clinical setting for diagnostic confirmation before starting treatment. Annual screening for adults aged 36 years or older or those who are at increased risk for blood pressure    (, Trig 103, LDL 78)  Lipid Disorders in Adults: Screening -- Men 28 and Older  Grade: A (Recommended) recommends screening men aged 28 and older for lipid disorders.      (Nondrinker) Alcohol Misuse: Screening and Behavioral Counseling Interventions in Primary Care -- Adults  Grade: B (Recommended) recommends that clinicians screen adults aged 25 years or older for alcohol misuse and provide persons engaged in risky or hazardous drinking with brief behavioral counseling interventions to reduce alcohol misuse. (AIC 6.4) Abnormal Blood Glucose and Type 2 Diabetes Mellitus: Screening -- Adults aged 36 to 79 years who are overweight or obese Grade: B (Recommended)    (Never smoked) Abdominal Aortic Aneurysm: Screening -- Men Ages 72 to 76 Years Who Have Ever Smoked. Grade: B (Recommended) recommends one-time screening for abdominal aortic aneurysm (AAA) with ultrasonography in men ages 72 to 76 years who have ever smoked. (BMI 41.40)  Obesity: Screening for and Management of-- All Adults  Grade: B(Recommended) recommends screening all adults for obesity. Clinicians should offer or refer patients with a body mass index (BMI) of 30 kg/m2 or higher to intensive, multicomponent behavioral interventions. (Not at high risk) Hepatitis C Virus Infection: Screening--Adults at High Risk and Adults born between 1945 and 1965  Grade: B (Recommended) recommends screening for hepatitis C virus (HCV) infection in persons at high risk for infection. The USPSTF also recommends offering 1-time screening for HCV infection to adults born between [address] and 1965. (High risk)  Fall Prevention -- Exercise/Physical Therapy: Community-dwelling Adults 72 Years or Older, Increased Risk for Falls   Grade: B (Recommended) recommends exercise or physical therapy to prevent falls in community-dwelling adults aged 72 years or older who are at increased risk for falls. (Negative)  Depression: Screening -- General adult population, including pregnant and postpartum women  Grade:  B(Recommended) recommends screening for depression in the general adult population,  Screening should be implemented with adequate systems in place to ensure accurate diagnosis, effective treatment, and appropriate follow-up. (2020) Glaucoma: Screening - Adults and Diabetic Eye Exam     (2020) Vitamin D Deficiency: Screening --       (TSH 3.79) Thyroid Dysfunction: Screening --      (2020) Skin Cancer: Screening --Asymptomatic adults  Grade: I(Uncertain)     (Done) Carotid Artery Stenosis: Screening -- Adults  Grade: D (Not Recommended)     (2020) Coronary Heart Disease: Screening with Electrocardiography--Adults at Low Risk  Grade: D (Not Recommended) echocardiogram done in 2017 along with a chemical stress test    (2019) Prostate Cancer: Prostate-Specific Antigen (PSA)-Based Screening -- All Men   PSA 1.39 Mar 10 2022  PSA 1.75 Dec 29 2020  PSA 1.8 December 13, 2019  PSA 2.0 November 28, 2018  PSA 1.9 September 2017  PSA 1.4 July 2017  PSA 1.2 July 2016      Educational materials  printed for patient's review and were included in patient instructions on his After Visit Summary and given to patient at the end of visit. Counseled regarding above diagnosis, including possible risks and complications,  especially if left uncontrolled. Counseled regarding the possible side effects, risks, benefits and alternatives to treatment; patient and/or guardian verbalizes understanding, agrees, feels comfortable with and wishes to proceed with above treatment plan. Advised patient to call with any new medication issues, and read all Rx info from pharmacy to assure aware of all possible risks and side effects of medication before taking. Reviewed age and gender appropriate health screening exams and vaccinations. Advised patient regarding importance of keeping up with recommended health maintenance and to schedule as soon as possible if overdue, as this is important in assessing for undiagnosed pathology, especially cancer, as well as protecting against potentially harmful/life threatening disease.        Patient verbalizes understanding and agrees with above counseling, assessment and plan. All questions answered. On 06/16/22 I have spent 30 reviewing previous notes, test results and face to face with the patient discussing the diagnosis and importance of compliance with the treatment plan as well as documenting on the day of the visit. Educational materials exercises printed for patient's review and were included in patient instructions on their After Visit Summary and given to patient at the end of visit.      Return in about 16 weeks (around 9/21/2022). Patient is here for a face-to-face for wheelchair and seat belt  Rachel Alvarado requires the assistance of a lightweight wheelchair to successfully complete daily living tasks such as: toileting, bathing, dressing, and grooming; or any other daily living task in the home. A lightweight wheelchair is necessary due to the patient's impaired ambulation and mobility restrictions. The patient would not be able to resolve these daily living tasks using a cane or walker. The patient can self-propel a lightweight wheelchair safely in their home and can maneuver within their home with adequate access. There is staff also available to help he will also be using this to transport to and from [facility] and the seatbelt will help to avoid any falls out of the chair while in transport the patient has not expressed an unwillingness to use the wheelchair. Electronically signed by DARREN Su CNP on 6/1/2022 at 2:17 PM      LAST  VISIT  This is a very pleasant 71-year-old white male who presents today with a hockey jersey on. He is very interested in his diapers I guess there was a miscommunication and he has not gotten any interview days but that seems to have been rectified he is here for evaluation and management of chronic medical problems. Current medication list reviewed. The patient is tolerating all medications well without adverse events or known side effects.  The patient does understand the risk and benefits of the prescribed medications. The patient is not up-to-date on all age-appropriate wellness issues. Patient denies any reccent hospitalizations or ER visit. No Acute Complaints reported:    CHRONIC CONDITION:    DM: Mild intensity but controlled on   OZEMPIC, 0.25 OR 0.5 MG/DOSE, 2 MG/1.5ML SOPN, Inject 0.5 mg into the skin once a week , Disp: , Rfl:    remains without symptoms, no weight loss, no increase in thirst, or urination and no low blood sugar incidents. No reports of poor circulation or issues with feet ulceration or injury. Is better when compliant with diet and medications. Vision Exam Up to Date:                             Y   2022  Currently on an ACE  :                                Y  Most Recent Hgb A1c <7:                           Y   AIC 6.8  Microalbumin Test Reviewed: Y   Foot Exam Up To Date:                               Y 2022  Flu Shot Up To Date:                                   Y   2020  Has patient been seen by Neurology           N   Currently on a Statin                                    Y       HTN: Stable hypertension, controlled on     amLODIPine (NORVASC) 10 MG tablet, Take 10 mg by mouth daily, Disp: , Rfl:    enalapril (VASOTEC) 10 MG tablet, Take 10 mg by mouth daily, Disp: , Rfl:    remains at a mild intensity but overall good control, without symptoms, no ringing in the ears, no headaches and no nose bleeds. Better on medications. CAD and CHF: Patient currently not on a Betablocker but is on an ACE for LVSD. Tobacco Use: N    Flu Shot Up To Date: Y     Meds Listed in Medical Record: Y     Hyperlipidemia: Mild in intensity but controlled on    atorvastatin (LIPITOR) 20 MG tablet, Take 20 mg by mouth daily, Disp: , Rfl:  without symptoms, no complications with dietary treatment regimen reporting no side effects or intolereances. Compliant with treatment and diet. No muscle aches, new joint pains or abd pain.     Hypothyroid: Remains controlled, without symptoms, on    levothyroxine (SYNTHROID) 100 MCG tablet, Take 100 mcg by mouth Daily, Disp: , Rfl:   with no evidence of weight gain, palpitations, fatigue, difficulty swallowing, neck prominence or hair loss. Is better on medications and worsens with forgetting to take medications. Has been on medications religiously since last visit. Depression/Anxiety/PBA/GOUT: Stable depression with generalized anxiety. Mild in intensity but well controlled on   traMADol (ULTRAM) 50 MG tablet, Take 1 tablet by mouth 3 times daily for 93 days. , Disp: 93 tablet, Rfl:   fluticasone (FLONASE) 50 MCG/ACT nasal spray, 1 spray by Each Nostril route daily, Disp: , Rfl:   betamethasone dipropionate (DIPROLENE) 0.05 % cream, Apply topically 2 times daily. PRN for 14 days and then take 7 day break, Disp: 1 Tube, Rfl: 3  Azelastine HCl 137 MCG/SPRAY SOLN, 2 sprays by Nasal route daily In each nostril, Disp: , Rfl:  acetaminophen (TYLENOL) 325 MG tablet, Take 325 mg by mouth 3 times daily , Disp: , Rfl:   allopurinol (ZYLOPRIM) 100 MG tablet, Take 100 mg by mouth daily, Disp: , Rfl:   FLUoxetine (PROZAC) 40 MG capsule, Take 40 mg by mouth daily Take with 20 mg capsuleto equal 60 mg daily, Disp: , Rfl:   oxybutynin (DITROPAN-XL) 5 MG extended release tablet, Take 5 mg by mouth nightly, Disp: , Rfl:   docusate sodium (COLACE) 100 MG capsule, Take 100 mg by mouth 1 CAPSULE EVERY MORNING AND 2 CAPSULES NIGHTLY, Disp: , Rfl:   Aspirin Buf,YvPar-[id number]-JtXmu, (BUFFERED ASPIRIN) 325 MG TABS, Take 1 tablet by mouth daily, Disp: , Rfl:   FLUoxetine (PROZAC) 20 MG capsule, Take 20 mg by mouth daily Take with 40 mg capsule to equal 60mg daily, Disp: , Rfl:   diclofenac sodium (VOLTAREN) 1 % GEL, Apply 4 g topically 4 times daily, Disp: , Rfl:   menthol-zinc oxide (CALMOSEPTINE) 0.44-20.625 % OINT ointment, Apply 1 applicator topically 2 times daily Prn.  Max 30 ml per day., Disp: , Rfl:    without symptoms, no weight gain, no increase in anxiety, no suicidal or homicidal ideation's no unexplained fatigue, or relationship difficulties relayed this visit. Vimal Flynn requires the assistance of a lightweight wheelchair to successfully complete daily living tasks such as: toileting, bathing, dressing, and grooming; or any other daily living task in the home. A lightweight wheelchair is necessary due to the patient's impaired ambulation and mobility restrictions. The patient would not be able to resolve these daily living tasks using a cane or walker. The patient can self-propel a lightweight wheelchair safely in their home and can maneuver within their home with adequate access. The patient cannot self-propel in a standard wheelchair. The patient has not expressed an unwillingness to use the wheelchair. Electronically signed by DARREN Bell CNP on 6/1/2022 at 2:17 PM      Review of Systems   Constitutional: Negative for activity change, chills, diaphoresis, fatigue, fever and unexpected weight change. HENT: Negative for trouble swallowing and voice change. Eyes: Negative for visual disturbance. Respiratory: Negative for cough, chest tightness, shortness of breath and wheezing. Cardiovascular: Negative for chest pain, palpitations and leg swelling. Gastrointestinal: Negative for abdominal pain, blood in stool, constipation, diarrhea, nausea and vomiting. Endocrine: Negative for polydipsia, polyphagia and polyuria. Genitourinary: Negative for dysuria, enuresis, frequency and hematuria. Musculoskeletal: Negative for arthralgias, back pain, gait problem, joint swelling, myalgias and neck stiffness. Skin: Negative for rash. Bruising noted along the chest wall no other falls   Neurological: Negative for dizziness, seizures, syncope, facial asymmetry, weakness, light-headedness, numbness and headaches. Hematological: Does not bruise/bleed easily.    Psychiatric/Behavioral: Negative for behavioral problems, confusion, hallucinations and suicidal ideas. The patient is not nervous/anxious. Current Outpatient Medications:     fluticasone (FLONASE) 50 MCG/ACT nasal spray, 1 spray by Each Nostril route daily, Disp: , Rfl:     OZEMPIC, 0.25 OR 0.5 MG/DOSE, 2 MG/1.5ML SOPN, Inject 0.5 mg into the skin once a week , Disp: , Rfl:     betamethasone dipropionate (DIPROLENE) 0.05 % cream, Apply topically 2 times daily. PRN for 14 days and then take 7 day break, Disp: 1 Tube, Rfl: 3    Azelastine HCl 137 MCG/SPRAY SOLN, 2 sprays by Nasal route daily In each nostril, Disp: , Rfl:     atorvastatin (LIPITOR) 20 MG tablet, Take 20 mg by mouth daily, Disp: , Rfl:     levothyroxine (SYNTHROID) 100 MCG tablet, Take 100 mcg by mouth Daily, Disp: , Rfl:     acetaminophen (TYLENOL) 325 MG tablet, Take 325 mg by mouth 3 times daily , Disp: , Rfl:     amLODIPine (NORVASC) 10 MG tablet, Take 10 mg by mouth daily, Disp: , Rfl:     enalapril (VASOTEC) 10 MG tablet, Take 10 mg by mouth daily, Disp: , Rfl:     allopurinol (ZYLOPRIM) 100 MG tablet, Take 100 mg by mouth daily, Disp: , Rfl:     FLUoxetine (PROZAC) 40 MG capsule, Take 40 mg by mouth daily Take with 20 mg capsuleto equal 60 mg daily, Disp: , Rfl:     oxybutynin (DITROPAN-XL) 5 MG extended release tablet, Take 5 mg by mouth nightly, Disp: , Rfl:     docusate sodium (COLACE) 100 MG capsule, Take 100 mg by mouth 1 CAPSULE EVERY MORNING AND 2 CAPSULES NIGHTLY, Disp: , Rfl:     Aspirin Buf,[id number]-[id number]-VwDst, (BUFFERED ASPIRIN) 325 MG TABS, Take 1 tablet by mouth daily, Disp: , Rfl:     FLUoxetine (PROZAC) 20 MG capsule, Take 20 mg by mouth daily Take with 40 mg capsule to equal 60mg daily, Disp: , Rfl:     diclofenac sodium (VOLTAREN) 1 % GEL, Apply 4 g topically 4 times daily, Disp: , Rfl:     traMADol (ULTRAM) 50 MG tablet, Take 1 tablet by mouth 3 times daily for 31 days. , Disp: 93 tablet, Rfl: 5    menthol-zinc oxide (CALMOSEPTINE) 0.44-20.625 % OINT ointment, Apply 1 applicator topically 2 times daily Prn. Max 30 ml per day. (Patient not taking: Reported on 5/6/2022), Disp: , Rfl:   No Known Allergies    Past Medical History:   Diagnosis Date    Chronic kidney disease (CKD), stage III (moderate) (HCC)     Diabetes mellitus (HCC)     DJD (degenerative joint disease)     Facies suggestive of Down syndrome     Hearing impaired     Hyperlipidemia     Hypertension     Hypothyroid     Mental deficiency     Urge urinary incontinence      Past Surgical History:   Procedure Laterality Date    COLONOSCOPY      COLONOSCOPY N/A 8/11/2021    COLONOSCOPY DIAGNOSTIC performed by Mariama Stevenson DO at [facility] ENDOSCOPY     History reviewed. No pertinent family history. Social History     Socioeconomic History    Marital status: Single     Spouse name: Not on file    Number of children: Not on file    Years of education: Not on file    Highest education level: Not on file   Occupational History    Not on file   Tobacco Use    Smoking status: Never Smoker    Smokeless tobacco: Never Used   Vaping Use    Vaping Use: Never used   Substance and Sexual Activity    Alcohol use: Never    Drug use: Never    Sexual activity: Not on file   Other Topics Concern    Not on file   Social History Narrative    Not on file     Social Determinants of Health     Financial Resource Strain: Low Risk     Difficulty of Paying Living Expenses: Not hard at all   Food Insecurity: No Food Insecurity    Worried About Running Out of Food in the Last Year: Never true    [address] in the Last Year: Never true   Transportation Needs:     Lack of Transportation (Medical): Not on file    Lack of Transportation (Non-Medical):  Not on file   Physical Activity:     Days of Exercise per Week: Not on file    Minutes of Exercise per Session: Not on file   Stress:     Feeling of Stress : Not on file   Social Connections:     Frequency of Communication with Friends and Family: Not on file    Frequency of Social Gatherings with Friends and Family: Not on file    Attends Tenriism Services: Not on file    Active Member of Clubs or Organizations: Not on file    Attends Club or Organization Meetings: Not on file    Marital Status: Not on file   Intimate Partner Violence:     Fear of Current or Ex-Partner: Not on file    Emotionally Abused: Not on file    Physically Abused: Not on file    Sexually Abused: Not on file   Housing Stability:     Unable to Pay for Housing in the Last Year: Not on file    Number of [place] in the Last Year: Not on file    Unstable Housing in the Last Year: Not on file       There were no vitals filed for this visit. Physical Exam  Vitals and nursing note reviewed. Constitutional:       Appearance: Normal appearance. HENT:      Head: Normocephalic. Nose: Nose normal.      Mouth/Throat:      Mouth: Mucous membranes are dry. Eyes:      Extraocular Movements: Extraocular movements intact. Pupils: Pupils are equal, round, and reactive to light. Cardiovascular:      Rate and Rhythm: Normal rate and regular rhythm. Pulmonary:      Effort: Pulmonary effort is normal.   Abdominal:      General: Bowel sounds are normal. There is no distension. Palpations: Abdomen is soft. Musculoskeletal:         General: Normal range of motion. Cervical back: Normal range of motion. No rigidity. No muscular tenderness. Skin:     General: Skin is warm and dry. Capillary Refill: Capillary refill takes 2 to 3 seconds. Findings: No bruising, lesion or rash. Neurological:      General: No focal deficit present. Mental Status: He is alert and oriented to person, place, and time. Motor: Weakness present.       Gait: Gait abnormal.   Psychiatric:         Attention and Perception: Attention normal.         Mood and Affect: Mood normal.         Behavior: Behavior normal.         Thought Content: Thought content does not include homicidal or suicidal ideation. Thought content does not include homicidal or suicidal plan. Deric Hill is a 77 y.o. male being evaluated by a Virtual Visit (video visit) encounter to address concerns as mentioned above. A caregiver was present when appropriate. Due to this being a TeleHealth encounter (During EGAUT-01 public health emergency), evaluation of the following organ systems was limited: Vitals/Constitutional/EENT/Resp/CV/GI//MS/Neuro/Skin/Heme-Lymph-Imm. Pursuant to the emergency declaration under the [address] [place] [address], [address] [place] [address] authority and the [employer] Act, this Virtual Visit was conducted with patient's (and/or legal guardian's) consent, to reduce the patient's risk of exposure to COVID-19 and provide necessary medical care. The patient (and/or legal guardian) has also been advised to contact this office for worsening conditions or problems, and seek emergency medical treatment and/or call 911 if deemed necessary. Patient identification was verified at the start of the visit: Yes    Total time spent on this encounter: 25 min    Services were provided through a video synchronous discussion virtually to substitute for in-person clinic visit. Patient and provider were located at their individual homes. Seen By:  DARREN Roland CNP  On the basis of positive falls risk screening, assessment and plan is as follows: home safety tips provided.

## 2022-06-15 ENCOUNTER — PATIENT MESSAGE (OUTPATIENT)
Dept: FAMILY MEDICINE CLINIC | Age: 77
End: 2022-06-15

## 2022-06-15 DIAGNOSIS — R26.9 UNSPECIFIED ABNORMALITIES OF GAIT AND MOBILITY: ICD-10-CM

## 2022-06-15 DIAGNOSIS — M15.9 PRIMARY OSTEOARTHRITIS INVOLVING MULTIPLE JOINTS: ICD-10-CM

## 2022-06-15 DIAGNOSIS — N18.31 STAGE 3A CHRONIC KIDNEY DISEASE (HCC): ICD-10-CM

## 2022-06-15 DIAGNOSIS — F79 MENTAL DEFICIENCY: ICD-10-CM

## 2022-06-15 DIAGNOSIS — I10 ESSENTIAL HYPERTENSION, MALIGNANT: ICD-10-CM

## 2022-06-15 DIAGNOSIS — Z91.81 AT HIGH RISK FOR FALLS: Primary | ICD-10-CM

## 2022-06-15 DIAGNOSIS — R01.1 CARDIAC MURMUR, UNSPECIFIED: ICD-10-CM

## 2022-06-15 DIAGNOSIS — Q90.9: ICD-10-CM

## 2022-06-15 RX ORDER — TRAMADOL HYDROCHLORIDE 50 MG/1
50 TABLET ORAL 3 TIMES DAILY
Qty: 93 TABLET | Refills: 5 | Status: SHIPPED | OUTPATIENT
Start: 2022-06-15 | End: 2022-07-16

## 2022-06-15 NOTE — TELEPHONE ENCOUNTER
From: Belle Bauer  To: Alma Bey  Sent: 6/15/2022 11:52 AM EDT  Subject: Wheelchair SEATBELT    Sorry to bother but Can we send an order to [facility] for a seatbelt for Amor wheelchair? They are afraid of him falling while using lift on the bus.  Thank you  Jessika [phone]

## 2022-06-16 ENCOUNTER — TELEPHONE (OUTPATIENT)
Dept: FAMILY MEDICINE CLINIC | Age: 77
End: 2022-06-16

## 2022-06-16 NOTE — TELEPHONE ENCOUNTER
We sent order for seatbelt for wheelchair yesterday. M is faxing that they need a face to face encounter that states the reason why it is need. Would you addend the visit of 6/1/22 which was for wheelchair need  Or does he need a new appt. ?

## 2022-06-20 ENCOUNTER — OFFICE VISIT (OUTPATIENT)
Dept: ORTHOPEDIC SURGERY | Age: 77
End: 2022-06-20
Payer: MEDICARE

## 2022-06-20 VITALS — BODY MASS INDEX: 40.15 KG/M2 | HEIGHT: 65 IN | WEIGHT: 241 LBS

## 2022-06-20 DIAGNOSIS — G56.01 CARPAL TUNNEL SYNDROME OF RIGHT WRIST: Primary | ICD-10-CM

## 2022-06-20 DIAGNOSIS — M19.031 ARTHRITIS OF RIGHT WRIST: ICD-10-CM

## 2022-06-20 PROCEDURE — 20605 DRAIN/INJ JOINT/BURSA W/O US: CPT | Performed by: PHYSICIAN ASSISTANT

## 2022-06-20 PROCEDURE — 20526 THER INJECTION CARP TUNNEL: CPT | Performed by: PHYSICIAN ASSISTANT

## 2022-06-20 RX ORDER — BETAMETHASONE SODIUM PHOSPHATE AND BETAMETHASONE ACETATE 3; 3 MG/ML; MG/ML
12 INJECTION, SUSPENSION INTRA-ARTICULAR; INTRALESIONAL; INTRAMUSCULAR; SOFT TISSUE ONCE
Status: COMPLETED | OUTPATIENT
Start: 2022-06-20 | End: 2022-06-20

## 2022-06-20 RX ADMIN — BETAMETHASONE SODIUM PHOSPHATE AND BETAMETHASONE ACETATE 12 MG: 3; 3 INJECTION, SUSPENSION INTRA-ARTICULAR; INTRALESIONAL; INTRAMUSCULAR; SOFT TISSUE at 11:15

## 2022-06-20 NOTE — PROGRESS NOTES
Department of Orthopedic Surgery  History and Physical      CHIEF COMPLAINT:  Right wrist pain    HISTORY OF PRESENT ILLNESS:                The patient is a 77 y.o. male who presents with right wrist pain. Patient at times inconsistent with history, caretaker in room and helped with providing history. Pt states he has had multiple falls since July of last year and has had ongoing wrist pain since. Pain is mostly located to the dorsal aspect of the thumb and wrist. He does not some occasional numbness and tingling into the hand. Pain is made worse with extension of the wrist and supporting his weight on his walker. Denies any prior injury to the wrist.     Patient had an EMG/NCS dated 12/14/21    Right median nerve motor latency: 5.99  Right ulnar nerve velocity: 43 below the elbow 30 above the elbow  Right median nerve sensory latency: 4.84    EMG portion of the exam demonstrates decreased recruitment pattern in the right triceps brachii. This is consistent with moderate right carpal tunnel syndrome along with chronic right C7 and C8 radiculopathy and sensory motor polyneuropathy    3 months ago the patient had cortisone injections to the right carpal tunnel and right dorsal wrist for arthritis. He states this did help his symptoms. He states he does have return of numbness and tingling to the hand and pain to the back of the wrist.  The pain to the back of the wrist is his biggest complaint. He is requesting repeat injections at today's visit along with his caregiver. He is uninterested in surgical management. He denies any new injury or complaints.        Past Medical History:        Diagnosis Date    Chronic kidney disease (CKD), stage III (moderate) (HCC)     Diabetes mellitus (HCC)     DJD (degenerative joint disease)     Facies suggestive of Down syndrome     Hearing impaired     Hyperlipidemia     Hypertension     Hypothyroid     Mental deficiency     Urge urinary incontinence      Past Surgical History:        Procedure Laterality Date    COLONOSCOPY      COLONOSCOPY N/A 8/11/2021    COLONOSCOPY DIAGNOSTIC performed by Digna Mark DO at [facility] ENDOSCOPY     Current Medications:   Current Facility-Administered Medications: betamethasone acetate-betamethasone sodium phosphate (CELESTONE) injection 12 mg, 12 mg, Intra-artICUlar, Once  Allergies:  Patient has no known allergies. Social History:   TOBACCO:   reports that he has never smoked. He has never used smokeless tobacco.  ETOH:   reports no history of alcohol use. DRUGS:   reports no history of drug use. ACTIVITIES OF DAILY LIVING:    OCCUPATION:    Family History:   History reviewed. No pertinent family history. REVIEW OF SYSTEMS:  CONSTITUTIONAL:  negative  HEENT:  negative  RESPIRATORY:  negative  CARDIOVASCULAR:  negative  MUSCULOSKELETAL:  Right wrist pain  NEUROLOGICAL:  negative  BEHAVIOR/PSYCH:  negative    PHYSICAL EXAM:    VITALS:  Ht 5' 5\" (1.651 m)   Wt 241 lb (109.3 kg)   BMI 40.10 kg/m²   CONSTITUTIONAL:  awake, alert, cooperative, no apparent distress, and appears stated age  EYES:  sclera clear, conjunctiva normal  ENT:  Normocephalic, without obvious abnormality, atraumatic  NEUROLOGIC:  Awake, alert, oriented to name, place and time. Cranial nerves II-XII are grossly intact. Motor is 5 out of 5 bilaterally. Sensory is intact.  gait is normal.  MUSCULOSKELETAL:    RUE: Chronic ulnar deviated deformity to the wrist. Wrist extension to 15, flexion to 25, radial deviation 0, ulnar deviation 10. TTP about the dorsal lunate bossing. Positive Tinel's and micah's. Stiffness with flexion and extension of the fingers. Sensation intact to entire hand. AIN, PIN and ulnar motor function intact to hand.      DATA:    CBC:   Lab Results   Component Value Date    WBC 10.9 03/10/2022    RBC 4.34 03/10/2022    HGB 14.3 03/10/2022    HCT 45.1 03/10/2022    .9 03/10/2022    MCH 32.9 03/10/2022    MCHC 31.7 03/10/2022 RDW 13.7 03/10/2022     03/10/2022    MPV 9.8 03/10/2022     PT/INR:  No results found for: PROTIME, INR      IMPRESSION:  · Right SLAC wrist  · Right carpal tunnel syndrome  · Sensorimotor polyneuropathy  · Right C7-C8 chronic radiculopathy  · Chronic kidney disease  · Mental deficiency  · Diabetes      PLAN:  Discussed findings with patient and his caregiver. Patient and caregiver requesting repeat injections at today's visit. Repeat injection was provided to the right carpal tunnel and right dorsal wrist.  Briefly discussed surgical options in the future if necessary. Patient is happy with his response to injections. He may repeat injections every 3 months as needed. Continue wrist brace as needed. All questions answered    Procedure Note Carpal Tunnel Injection    The right carpal tunnel was identified as the injection site. The risk and benefits of a cortisone injection were explained and the patient consented to the injection. Under sterile conditions, the carpal canal was injected with a mixture of 1 mL of 1% Lidocaine and 1 mL of 6 mg/mL Betamethasone without complication. A sterile bandage was applied. Procedure Note Wrist Cortisone Injection    The right wrist was identified as the injection site. The risk and benefits of a cortisone injection were explained and the patient consented to the injection. Under sterile conditions, the wrist was injected with a mixture of 1 mL of 1% Lidocaine and 1 mL of 6 mg/mL Betamethasone without complication. A sterile bandage was applied.

## 2022-08-12 PROBLEM — E11.51 TYPE 2 DIABETES MELLITUS WITH PERIPHERAL ANGIOPATHY (HCC): Status: RESOLVED | Noted: 2022-01-27 | Resolved: 2022-08-12

## 2022-08-12 PROBLEM — N18.30 STAGE 3 CHRONIC KIDNEY DISEASE (HCC): Status: RESOLVED | Noted: 2020-10-23 | Resolved: 2022-08-12

## 2022-08-12 PROBLEM — R94.31 ABNORMAL ELECTROCARDIOGRAM (ECG) (EKG): Status: RESOLVED | Noted: 2017-04-12 | Resolved: 2022-08-12

## 2022-09-02 DIAGNOSIS — E78.5 HYPERLIPIDEMIA, UNSPECIFIED HYPERLIPIDEMIA TYPE: ICD-10-CM

## 2022-09-02 DIAGNOSIS — E11.22 TYPE 2 DIABETES MELLITUS WITH STAGE 3B CHRONIC KIDNEY DISEASE, WITHOUT LONG-TERM CURRENT USE OF INSULIN (HCC): ICD-10-CM

## 2022-09-02 DIAGNOSIS — I10 ESSENTIAL HYPERTENSION, MALIGNANT: ICD-10-CM

## 2022-09-02 DIAGNOSIS — N18.32 TYPE 2 DIABETES MELLITUS WITH STAGE 3B CHRONIC KIDNEY DISEASE, WITHOUT LONG-TERM CURRENT USE OF INSULIN (HCC): ICD-10-CM

## 2022-09-02 LAB
ALBUMIN SERPL-MCNC: 3.9 G/DL (ref 3.5–5.2)
ALBUMIN SERPL-MCNC: 4 G/DL (ref 3.5–5.2)
ALP BLD-CCNC: 137 U/L (ref 40–129)
ALP BLD-CCNC: 141 U/L (ref 40–129)
ALT SERPL-CCNC: 21 U/L (ref 0–40)
ALT SERPL-CCNC: 21 U/L (ref 0–40)
ANION GAP SERPL CALCULATED.3IONS-SCNC: 14 MMOL/L (ref 7–16)
ANION GAP SERPL CALCULATED.3IONS-SCNC: 15 MMOL/L (ref 7–16)
AST SERPL-CCNC: 20 U/L (ref 0–39)
AST SERPL-CCNC: 21 U/L (ref 0–39)
BASOPHILS ABSOLUTE: 0.03 E9/L (ref 0–0.2)
BASOPHILS ABSOLUTE: 0.04 E9/L (ref 0–0.2)
BASOPHILS RELATIVE PERCENT: 0.3 % (ref 0–2)
BASOPHILS RELATIVE PERCENT: 0.4 % (ref 0–2)
BILIRUB SERPL-MCNC: 0.4 MG/DL (ref 0–1.2)
BILIRUB SERPL-MCNC: 0.4 MG/DL (ref 0–1.2)
BUN BLDV-MCNC: 29 MG/DL (ref 6–23)
BUN BLDV-MCNC: 29 MG/DL (ref 6–23)
CALCIUM SERPL-MCNC: 9.5 MG/DL (ref 8.6–10.2)
CALCIUM SERPL-MCNC: 9.6 MG/DL (ref 8.6–10.2)
CHLORIDE BLD-SCNC: 102 MMOL/L (ref 98–107)
CHLORIDE BLD-SCNC: 103 MMOL/L (ref 98–107)
CHOLESTEROL, TOTAL: 139 MG/DL (ref 0–199)
CO2: 21 MMOL/L (ref 22–29)
CO2: 21 MMOL/L (ref 22–29)
CREAT SERPL-MCNC: 1.9 MG/DL (ref 0.7–1.2)
CREAT SERPL-MCNC: 1.9 MG/DL (ref 0.7–1.2)
EOSINOPHILS ABSOLUTE: 0.15 E9/L (ref 0.05–0.5)
EOSINOPHILS ABSOLUTE: 0.18 E9/L (ref 0.05–0.5)
EOSINOPHILS RELATIVE PERCENT: 1.6 % (ref 0–6)
EOSINOPHILS RELATIVE PERCENT: 1.9 % (ref 0–6)
GFR AFRICAN AMERICAN: 42
GFR AFRICAN AMERICAN: 42
GFR NON-AFRICAN AMERICAN: 35 ML/MIN/1.73
GFR NON-AFRICAN AMERICAN: 35 ML/MIN/1.73
GLUCOSE BLD-MCNC: 109 MG/DL (ref 74–99)
GLUCOSE FASTING: 111 MG/DL (ref 74–99)
HBA1C MFR BLD: 6.4 % (ref 4–5.6)
HCT VFR BLD CALC: 46.5 % (ref 37–54)
HCT VFR BLD CALC: 46.8 % (ref 37–54)
HDLC SERPL-MCNC: 47 MG/DL
HEMOGLOBIN: 14.7 G/DL (ref 12.5–16.5)
HEMOGLOBIN: 15.1 G/DL (ref 12.5–16.5)
IMMATURE GRANULOCYTES #: 0.05 E9/L
IMMATURE GRANULOCYTES #: 0.07 E9/L
IMMATURE GRANULOCYTES %: 0.5 % (ref 0–5)
IMMATURE GRANULOCYTES %: 0.7 % (ref 0–5)
LDL CHOLESTEROL CALCULATED: 77 MG/DL (ref 0–99)
LYMPHOCYTES ABSOLUTE: 2.03 E9/L (ref 1.5–4)
LYMPHOCYTES ABSOLUTE: 2.12 E9/L (ref 1.5–4)
LYMPHOCYTES RELATIVE PERCENT: 21.6 % (ref 20–42)
LYMPHOCYTES RELATIVE PERCENT: 22.1 % (ref 20–42)
MAGNESIUM: 2.2 MG/DL (ref 1.6–2.6)
MCH RBC QN AUTO: 32 PG (ref 26–35)
MCH RBC QN AUTO: 33.2 PG (ref 26–35)
MCHC RBC AUTO-ENTMCNC: 31.6 % (ref 32–34.5)
MCHC RBC AUTO-ENTMCNC: 32.3 % (ref 32–34.5)
MCV RBC AUTO: 101.3 FL (ref 80–99.9)
MCV RBC AUTO: 102.9 FL (ref 80–99.9)
MONOCYTES ABSOLUTE: 0.87 E9/L (ref 0.1–0.95)
MONOCYTES ABSOLUTE: 0.88 E9/L (ref 0.1–0.95)
MONOCYTES RELATIVE PERCENT: 9.2 % (ref 2–12)
MONOCYTES RELATIVE PERCENT: 9.3 % (ref 2–12)
NEUTROPHILS ABSOLUTE: 6.22 E9/L (ref 1.8–7.3)
NEUTROPHILS ABSOLUTE: 6.33 E9/L (ref 1.8–7.3)
NEUTROPHILS RELATIVE PERCENT: 66 % (ref 43–80)
NEUTROPHILS RELATIVE PERCENT: 66.4 % (ref 43–80)
PDW BLD-RTO: 14 FL (ref 11.5–15)
PDW BLD-RTO: 14.1 FL (ref 11.5–15)
PHOSPHORUS: 3.6 MG/DL (ref 2.5–4.5)
PLATELET # BLD: 296 E9/L (ref 130–450)
PLATELET # BLD: 313 E9/L (ref 130–450)
PMV BLD AUTO: 9.5 FL (ref 7–12)
PMV BLD AUTO: 9.7 FL (ref 7–12)
POTASSIUM SERPL-SCNC: 5.6 MMOL/L (ref 3.5–5)
POTASSIUM SERPL-SCNC: 5.6 MMOL/L (ref 3.5–5)
RBC # BLD: 4.55 E12/L (ref 3.8–5.8)
RBC # BLD: 4.59 E12/L (ref 3.8–5.8)
SODIUM BLD-SCNC: 138 MMOL/L (ref 132–146)
SODIUM BLD-SCNC: 138 MMOL/L (ref 132–146)
TOTAL PROTEIN: 7.6 G/DL (ref 6.4–8.3)
TOTAL PROTEIN: 7.7 G/DL (ref 6.4–8.3)
TRIGL SERPL-MCNC: 73 MG/DL (ref 0–149)
URIC ACID, SERUM: 4.9 MG/DL (ref 3.4–7)
VITAMIN D 25-HYDROXY: 22 NG/ML (ref 30–100)
VLDLC SERPL CALC-MCNC: 15 MG/DL
WBC # BLD: 9.4 E9/L (ref 4.5–11.5)
WBC # BLD: 9.6 E9/L (ref 4.5–11.5)

## 2022-10-04 ENCOUNTER — OFFICE VISIT (OUTPATIENT)
Dept: FAMILY MEDICINE CLINIC | Age: 77
End: 2022-10-04
Payer: MEDICARE

## 2022-10-04 VITALS
HEIGHT: 65 IN | BODY MASS INDEX: 40.15 KG/M2 | DIASTOLIC BLOOD PRESSURE: 78 MMHG | OXYGEN SATURATION: 98 % | TEMPERATURE: 97.5 F | WEIGHT: 241 LBS | HEART RATE: 78 BPM | SYSTOLIC BLOOD PRESSURE: 130 MMHG

## 2022-10-04 DIAGNOSIS — Z86.010 HX OF COLONIC POLYPS: ICD-10-CM

## 2022-10-04 DIAGNOSIS — Q90.9: ICD-10-CM

## 2022-10-04 DIAGNOSIS — M15.9 PRIMARY OSTEOARTHRITIS INVOLVING MULTIPLE JOINTS: ICD-10-CM

## 2022-10-04 DIAGNOSIS — I12.9 HYPERTENSIVE RENAL DISEASE: ICD-10-CM

## 2022-10-04 DIAGNOSIS — Z12.5 ENCOUNTER FOR PROSTATE CANCER SCREENING: ICD-10-CM

## 2022-10-04 DIAGNOSIS — E55.9 VITAMIN D DEFICIENCY: ICD-10-CM

## 2022-10-04 DIAGNOSIS — N18.32 TYPE 2 DIABETES MELLITUS WITH STAGE 3B CHRONIC KIDNEY DISEASE, WITHOUT LONG-TERM CURRENT USE OF INSULIN (HCC): ICD-10-CM

## 2022-10-04 DIAGNOSIS — Z91.81 AT HIGH RISK FOR FALLS: ICD-10-CM

## 2022-10-04 DIAGNOSIS — E03.8 OTHER SPECIFIED HYPOTHYROIDISM: ICD-10-CM

## 2022-10-04 DIAGNOSIS — E78.5 HYPERLIPIDEMIA, UNSPECIFIED HYPERLIPIDEMIA TYPE: ICD-10-CM

## 2022-10-04 DIAGNOSIS — N18.32 STAGE 3B CHRONIC KIDNEY DISEASE (HCC): Primary | ICD-10-CM

## 2022-10-04 DIAGNOSIS — E11.22 TYPE 2 DIABETES MELLITUS WITH STAGE 3B CHRONIC KIDNEY DISEASE, WITHOUT LONG-TERM CURRENT USE OF INSULIN (HCC): ICD-10-CM

## 2022-10-04 PROCEDURE — G8484 FLU IMMUNIZE NO ADMIN: HCPCS | Performed by: NURSE PRACTITIONER

## 2022-10-04 PROCEDURE — 1123F ACP DISCUSS/DSCN MKR DOCD: CPT | Performed by: NURSE PRACTITIONER

## 2022-10-04 PROCEDURE — G0008 ADMIN INFLUENZA VIRUS VAC: HCPCS | Performed by: NURSE PRACTITIONER

## 2022-10-04 PROCEDURE — G8417 CALC BMI ABV UP PARAM F/U: HCPCS | Performed by: NURSE PRACTITIONER

## 2022-10-04 PROCEDURE — 1036F TOBACCO NON-USER: CPT | Performed by: NURSE PRACTITIONER

## 2022-10-04 PROCEDURE — 3044F HG A1C LEVEL LT 7.0%: CPT | Performed by: NURSE PRACTITIONER

## 2022-10-04 PROCEDURE — 90694 VACC AIIV4 NO PRSRV 0.5ML IM: CPT | Performed by: NURSE PRACTITIONER

## 2022-10-04 PROCEDURE — 99214 OFFICE O/P EST MOD 30 MIN: CPT | Performed by: NURSE PRACTITIONER

## 2022-10-04 PROCEDURE — G8427 DOCREV CUR MEDS BY ELIG CLIN: HCPCS | Performed by: NURSE PRACTITIONER

## 2022-10-04 RX ORDER — DAPAGLIFLOZIN 5 MG/1
TABLET, FILM COATED ORAL
COMMUNITY
Start: 2022-09-24 | End: 2022-10-04 | Stop reason: SINTOL

## 2022-10-04 RX ORDER — TRAMADOL HYDROCHLORIDE 50 MG/1
TABLET ORAL
COMMUNITY
Start: 2022-09-24 | End: 2022-10-04

## 2022-10-04 ASSESSMENT — ENCOUNTER SYMPTOMS
WHEEZING: 0
ABDOMINAL PAIN: 0
NAUSEA: 0
DIARRHEA: 0
VOICE CHANGE: 0
BLOOD IN STOOL: 0
CHEST TIGHTNESS: 0
TROUBLE SWALLOWING: 0
VOMITING: 0
COUGH: 0
SHORTNESS OF BREATH: 0
CONSTIPATION: 0
BACK PAIN: 0

## 2022-10-04 ASSESSMENT — PATIENT HEALTH QUESTIONNAIRE - PHQ9
1. LITTLE INTEREST OR PLEASURE IN DOING THINGS: 0
SUM OF ALL RESPONSES TO PHQ QUESTIONS 1-9: 0
SUM OF ALL RESPONSES TO PHQ QUESTIONS 1-9: 0
2. FEELING DOWN, DEPRESSED OR HOPELESS: 0
SUM OF ALL RESPONSES TO PHQ9 QUESTIONS 1 & 2: 0
SUM OF ALL RESPONSES TO PHQ QUESTIONS 1-9: 0
SUM OF ALL RESPONSES TO PHQ QUESTIONS 1-9: 0

## 2022-10-04 NOTE — PROGRESS NOTES
Efren Mota : 1945 Sex: male  Age: 69 y.o. Chief Complaint   Patient presents with    Discuss Labs     Lab review 6 month check up, no problems at this time     Flu Vaccine     Given today        Assessment and Plan:    Shi Flores was seen today for discuss labs and flu vaccine. Diagnoses and all orders for this visit:    Stage 3b chronic kidney disease [facility])  Comments:  Stop Farxigaapparently added in May by Renal  Orders:  -     Comprehensive Metabolic Panel, Fasting; Future    Type 2 diabetes mellitus with stage 3b chronic kidney disease, without long-term current use of insulin (HCC)  -     Hemoglobin A1C; Future  -     Microalbumin, Ur; Future  -     Urinalysis; Future    Hx of colonic polyps    Hypertensive renal disease  -     CBC with Auto Differential; Future  -     Comprehensive Metabolic Panel, Fasting; Future  -     Urinalysis; Future    Vitamin D deficiency    Facies suggestive of Down syndrome    Other specified hypothyroidism  -     TSH; Future    Primary osteoarthritis involving multiple joints    At high risk for falls    BMI 40.0-44.9, adult [facility])    Encounter for prostate cancer screening  -     PSA Screening; Future    Hyperlipidemia, unspecified hyperlipidemia type  -     Lipid Panel; Future    Other orders  -     Influenza, FLUAD, (age 72 y+), IM, PF, 0.5 mL      USPTF:    () Colorectal Cancer: Screening --Adults aged 48 to 76 years  Grade: A (Recommended) recommends screening for colorectal cancer starting at age 48 years and continuing until age 76 years. (Non reactive) HIV: Screening - Adolescents and Adults  Grade: A (Recommended) recommends that clinicians screen for HIV infection in ages 13 to 72 years. (B/P 130/78) High Blood Pressure: Screening and Home Monitoring -- Adults  Grade: A (Recommended) recommends screening for high blood pressure in ages 25 years or older.    obtain measurements outside of the clinical setting for diagnostic confirmation before starting treatment. Annual screening for adults aged 36 years or older or those who are at increased risk for blood pressure    (, Trig 73, LDL 77)  Lipid Disorders in Adults: Screening -- Men [address] and Older  Grade: A (Recommended) recommends screening men aged [address] and older for lipid disorders. (Nondrinker) Alcohol Misuse: Screening and Behavioral Counseling Interventions in Primary Care -- Adults  Grade: B (Recommended) recommends that clinicians screen adults aged 25 years or older for alcohol misuse and provide persons engaged in risky or hazardous drinking with brief behavioral counseling interventions to reduce alcohol misuse. (AIC 6.4) Abnormal Blood Glucose and Type 2 Diabetes Mellitus: Screening -- Adults aged 36 to 79 years who are overweight or obese Grade: B (Recommended)    (Never smoked) Abdominal Aortic Aneurysm: Screening -- Men Ages 72 to 76 Years Who Have Ever Smoked. Grade: B (Recommended) recommends one-time screening for abdominal aortic aneurysm (AAA) with ultrasonography in men ages 72 to 76 years who have ever smoked. (BMI 40.10)  Obesity: Screening for and Management of-- All Adults  Grade: B(Recommended) recommends screening all adults for obesity. Clinicians should offer or refer patients with a body mass index (BMI) of 30 kg/m2 or higher to intensive, multicomponent behavioral interventions. (Not at high risk) Hepatitis C Virus Infection: Screening--Adults at High Risk and Adults born between 1945 and 1965  Grade: B (Recommended) recommends screening for hepatitis C virus (HCV) infection in persons at high risk for infection. The USPSTF also recommends offering 1-time screening for HCV infection to adults born between 80 and 1965.      (High risk)  Fall Prevention -- Exercise/Physical Therapy: Community-dwelling Adults 72 Years or Older, Increased Risk for Falls   Grade: B (Recommended) recommends exercise or physical therapy to prevent falls in community-dwelling adults aged 72 years or older who are at increased risk for falls. (Negative)  Depression: Screening -- General adult population, including pregnant and postpartum women  Grade: B(Recommended) recommends screening for depression in the general adult population,  Screening should be implemented with adequate systems in place to ensure accurate diagnosis, effective treatment, and appropriate follow-up. (2020) Glaucoma: Screening - Adults and Diabetic Eye Exam     (2020) Vitamin D Deficiency: Screening --       (TSH 4.36) Thyroid Dysfunction: Screening --      (2020) Skin Cancer: Screening --Asymptomatic adults  Grade: I(Uncertain)     (Done) Carotid Artery Stenosis: Screening -- Adults  Grade: D (Not Recommended)     (2020) Coronary Heart Disease: Screening with Electrocardiography--Adults at Low Risk  Grade: D (Not Recommended) echocardiogram done in 2017 along with a chemical stress test    (2019) Prostate Cancer: Prostate-Specific Antigen (PSA)-Based Screening -- All Men   PSA 1.39 Mar 10 2022  PSA 1.75 Dec 29 2020  PSA 1.8 December 13, 2019  PSA 2.0 November 28, 2018  PSA 1.9 September 2017  PSA 1.4 July 2017  PSA 1.2 July 2016      Educational materials  printed for patient's review and were included in patient instructions on his After Visit Summary and given to patient at the end of visit. Counseled regarding above diagnosis, including possible risks and complications,  especially if left uncontrolled. Counseled regarding the possible side effects, risks, benefits and alternatives to treatment; patient and/or guardian verbalizes understanding, agrees, feels comfortable with and wishes to proceed with above treatment plan. Advised patient to call with any new medication issues, and read all Rx info from pharmacy to assure aware of all possible risks and side effects of medication before taking. Reviewed age and gender appropriate health screening exams and vaccinations.   Advised patient regarding importance of keeping up with recommended health maintenance and to schedule as soon as possible if overdue, as this is important in assessing for undiagnosed pathology, especially cancer, as well as protecting against potentially harmful/life threatening disease. Patient verbalizes understanding and agrees with above counseling, assessment and plan. All questions answered. On 10/04/22 I have spent 30 reviewing previous notes, test results and face to face with the patient discussing the diagnosis and importance of compliance with the treatment plan as well as documenting on the day of the visit. Educational materials exercises printed for patient's review and were included in patient instructions on their After Visit Summary and given to patient at the end of visit. Return in about 6 months (around 4/4/2023) for Routine Visit with Labs. Ruiz Nice is a very pleasant developmentally delayed 67-year-old white male who actually appears younger than his stated age and is still pretty vibrant. He presents today for evaluation and management of chronic medical problems. Current medication list reviewed. The patient is tolerating all medications well without adverse events or known side effects. The patient does understand the risk and benefits of the prescribed medications. The patient is not up-to-date on all age-appropriate wellness issues. Patient denies any reccent hospitalizations or ER visit. Acute Complaints reported: He has followed by nephrology for chronic kidney disease and apparently was put on [place] and may but is kidney function continues to go down his A1c is good enough for I think we can just leave him on the Ozempic and take him off the [place] and see if his enzymes improve      This is a very pleasant 67-year-old white male who presents today with a hockey jersey on.   He is very interested in his diapers I guess there was a miscommunication and he has not gotten any interview days but that seems to have been rectified he is here for evaluation and management of chronic medical problems. Current medication list reviewed. The patient is tolerating all medications well without adverse events or known side effects. The patient does understand the risk and benefits of the prescribed medications. The patient is not up-to-date on all age-appropriate wellness issues. Patient denies any reccent hospitalizations or ER visit. No Acute Complaints reported:    CHRONIC CONDITION:    DM: Mild intensity but controlled on ·  OZEMPIC, 0.25 OR 0.5 MG/DOSE, 2 MG/1.5ML SOPN, Inject 0.5 mg into the skin once a week , Disp: , Rfl:    remains without symptoms, no weight loss, no increase in thirst, or urination and no low blood sugar incidents. No reports of poor circulation or issues with feet ulceration or injury. Is better when compliant with diet and medications. Vision Exam Up to Date:                             Y 2021  Currently on an ACE  :                                Y  Most Recent Hgb A1c <7:                           Y   AIC 6.4   Microalbumin Test Reviewed: Y   Foot Exam Up To Date:                               Y 2021  Flu Shot Up To Date:                                   Y 2020  Has patient been seen by Neurology           N   Currently on a Statin                                    Y       HTN: Stable hypertension, controlled on  · ·  amLODIPine (NORVASC) 10 MG tablet, Take 10 mg by mouth daily, Disp: , Rfl:  ·  enalapril (VASOTEC) 10 MG tablet, Take 10 mg by mouth daily, Disp: , Rfl:    remains at a mild intensity but overall good control, without symptoms, no ringing in the ears, no headaches and no nose bleeds. Better on medications. CAD and CHF: Patient currently not on a Betablocker but is on an ACE for LVSD. Tobacco Use: N    Flu Shot Up To Date: Y     Meds Listed in Medical Record:  Y     Hyperlipidemia: Mild in intensity but controlled on  ·  atorvastatin (LIPITOR) 20 MG tablet, Take 20 mg by mouth daily, Disp: , Rfl:  without symptoms, no complications with dietary treatment regimen reporting no side effects or intolereances. Compliant with treatment and diet. No muscle aches, new joint pains or abd pain. Hypothyroid: Remains controlled, without symptoms, on  ·  levothyroxine (SYNTHROID) 100 MCG tablet, Take 100 mcg by mouth Daily, Disp: , Rfl:   with no evidence of weight gain, palpitations, fatigue, difficulty swallowing, neck prominence or hair loss. Is better on medications and worsens with forgetting to take medications. Has been on medications religiously since last visit. Depression/Anxiety/PBA/GOUT: Stable depression with generalized anxiety. Mild in intensity but well controlled on ·  traMADol (ULTRAM) 50 MG tablet, Take 1 tablet by mouth 3 times daily for 93 days. , Disp: 93 tablet, Rfl: ·  fluticasone (FLONASE) 50 MCG/ACT nasal spray, 1 spray by Each Nostril route daily, Disp: , Rfl: ·  betamethasone dipropionate (DIPROLENE) 0.05 % cream, Apply topically 2 times daily.  PRN for 14 days and then take 7 day break, Disp: 1 Tube, Rfl: 3·  Azelastine HCl 137 MCG/SPRAY SOLN, 2 sprays by Nasal route daily In each nostril, Disp: , Rfl:·  acetaminophen (TYLENOL) 325 MG tablet, Take 325 mg by mouth 3 times daily , Disp: , Rfl: ·  allopurinol (ZYLOPRIM) 100 MG tablet, Take 100 mg by mouth daily, Disp: , Rfl: ·  FLUoxetine (PROZAC) 40 MG capsule, Take 40 mg by mouth daily Take with 20 mg capsuleto equal 60 mg daily, Disp: , Rfl: ·  oxybutynin (DITROPAN-XL) 5 MG extended release tablet, Take 5 mg by mouth nightly, Disp: , Rfl: ·  docusate sodium (COLACE) 100 MG capsule, Take 100 mg by mouth 1 CAPSULE EVERY MORNING AND 2 CAPSULES NIGHTLY, Disp: , Rfl: ·  Aspirin Buf,[id number]-[id number]-[id number], (BUFFERED ASPIRIN) 325 MG TABS, Take 1 tablet by mouth daily, Disp: , Rfl: ·  FLUoxetine (PROZAC) 20 MG capsule, Take 20 mg by mouth daily Take with 40 mg capsule to equal 60mg daily, Disp: , Rfl: ·  diclofenac sodium (VOLTAREN) 1 % GEL, Apply 4 g topically 4 times daily, Disp: , Rfl: ·  menthol-zinc oxide (CALMOSEPTINE) 0.44-20.625 % OINT ointment, Apply 1 applicator topically 2 times daily Prn. Max 30 ml per day., Disp: , Rfl:    without symptoms, no weight gain, no increase in anxiety, no suicidal or homicidal ideation's no unexplained fatigue, or relationship difficulties relayed this visit. Review of Systems   Constitutional:  Negative for activity change, chills, diaphoresis, fatigue, fever and unexpected weight change. HENT:  Negative for trouble swallowing and voice change. Eyes:  Negative for visual disturbance. Respiratory:  Negative for cough, chest tightness, shortness of breath and wheezing. Cardiovascular:  Negative for chest pain, palpitations and leg swelling. Gastrointestinal:  Negative for abdominal pain, blood in stool, constipation, diarrhea, nausea and vomiting. Endocrine: Negative for polydipsia, polyphagia and polyuria. Genitourinary:  Negative for dysuria, enuresis, frequency and hematuria. Musculoskeletal:  Negative for arthralgias, back pain, gait problem, joint swelling, myalgias and neck stiffness. Skin:  Negative for rash. Bruising noted along the chest wall no other falls   Neurological:  Negative for dizziness, seizures, syncope, facial asymmetry, weakness, light-headedness, numbness and headaches. Hematological:  Does not bruise/bleed easily. Psychiatric/Behavioral:  Negative for behavioral problems, confusion, hallucinations and suicidal ideas. The patient is not nervous/anxious.         Current Outpatient Medications:     fluticasone (FLONASE) 50 MCG/ACT nasal spray, 1 spray by Each Nostril route daily, Disp: , Rfl:     OZEMPIC, 0.25 OR 0.5 MG/DOSE, 2 MG/1.5ML SOPN, Inject 0.5 mg into the skin once a week , Disp: , Rfl:     betamethasone dipropionate (DIPROLENE) 0.05 % cream, Apply topically 2 times daily. PRN for 14 days and then take 7 day break, Disp: 1 Tube, Rfl: 3    Azelastine HCl 137 MCG/SPRAY SOLN, 2 sprays by Nasal route daily In each nostril, Disp: , Rfl:     atorvastatin (LIPITOR) 20 MG tablet, Take 20 mg by mouth daily, Disp: , Rfl:     levothyroxine (SYNTHROID) 100 MCG tablet, Take 100 mcg by mouth Daily, Disp: , Rfl:     acetaminophen (TYLENOL) 325 MG tablet, Take 325 mg by mouth 3 times daily , Disp: , Rfl:     amLODIPine (NORVASC) 10 MG tablet, Take 10 mg by mouth daily, Disp: , Rfl:     enalapril (VASOTEC) 10 MG tablet, Take 10 mg by mouth daily, Disp: , Rfl:     allopurinol (ZYLOPRIM) 100 MG tablet, Take 100 mg by mouth daily, Disp: , Rfl:     FLUoxetine (PROZAC) 40 MG capsule, Take 40 mg by mouth daily Take with 20 mg capsuleto equal 60 mg daily, Disp: , Rfl:     oxybutynin (DITROPAN-XL) 5 MG extended release tablet, Take 5 mg by mouth nightly, Disp: , Rfl:     docusate sodium (COLACE) 100 MG capsule, Take 100 mg by mouth 1 CAPSULE EVERY MORNING AND 2 CAPSULES NIGHTLY, Disp: , Rfl:     Aspirin Buf,[id number]-XcMum, (BUFFERED ASPIRIN) 325 MG TABS, Take 1 tablet by mouth daily, Disp: , Rfl:     diclofenac sodium (VOLTAREN) 1 % GEL, Apply 4 g topically 4 times daily, Disp: , Rfl:     menthol-zinc oxide (CALMOSEPTINE) 0.44-20.625 % OINT ointment, Apply 1 applicator topically 2 times daily Prn. Max 30 ml per day., Disp: , Rfl:   No Known Allergies    Past Medical History:   Diagnosis Date    Chronic kidney disease (CKD), stage III (moderate) (HCC)     Diabetes mellitus (HCC)     DJD (degenerative joint disease)     Facies suggestive of Down syndrome     Hearing impaired     Hyperlipidemia     Hypertension     Hypothyroid     Mental deficiency     Urge urinary incontinence      Past Surgical History:   Procedure Laterality Date    COLONOSCOPY      COLONOSCOPY N/A 8/11/2021    COLONOSCOPY DIAGNOSTIC performed by Romana Jesus DO at [facility] ENDOSCOPY     No family history on file.   Social History     Socioeconomic History    Marital status: Single     Spouse name: Not on file    Number of children: Not on file    Years of education: Not on file    Highest education level: Not on file   Occupational History    Not on file   Tobacco Use    Smoking status: Never    Smokeless tobacco: Never   Vaping Use    Vaping Use: Never used   Substance and Sexual Activity    Alcohol use: Never    Drug use: Never    Sexual activity: Not on file   Other Topics Concern    Not on file   Social History Narrative    Not on file     Social Determinants of Health     Financial Resource Strain: Low Risk     Difficulty of Paying Living Expenses: Not hard at all   Food Insecurity: No Food Insecurity    Worried About Running Out of Food in the Last Year: Never true    Ran Out of Food in the Last Year: Never true   Transportation Needs: Not on file   Physical Activity: Not on file   Stress: Not on file   Social Connections: Not on file   Intimate Partner Violence: Not on file   Housing Stability: Not on file       Vitals:    10/04/22 0918   BP: 130/78   Pulse: 78   Temp: 97.5 °F (36.4 °C)   TempSrc: Temporal   SpO2: 98%   Weight: 241 lb (109.3 kg)   Height: 5' 5\" (1.651 m)       Physical Exam  Vitals and nursing note reviewed. Constitutional:       Appearance: Normal appearance. HENT:      Head: Normocephalic. Right Ear: Tympanic membrane and ear canal normal. There is no impacted cerumen. Left Ear: Tympanic membrane and ear canal normal. There is no impacted cerumen. Nose: Nose normal.      Mouth/Throat:      Mouth: Mucous membranes are dry. Eyes:      Extraocular Movements: Extraocular movements intact. Pupils: Pupils are equal, round, and reactive to light. Neck:      Vascular: No carotid bruit. Cardiovascular:      Rate and Rhythm: Normal rate and regular rhythm. Pulses: Normal pulses. Heart sounds: Normal heart sounds. No murmur heard. No friction rub. No gallop.    Pulmonary: Effort: Pulmonary effort is normal. No respiratory distress. Breath sounds: Normal breath sounds. No stridor. No wheezing, rhonchi or rales. Chest:      Chest wall: No tenderness. Abdominal:      General: Bowel sounds are normal. There is no distension. Palpations: Abdomen is soft. Musculoskeletal:         General: No swelling, tenderness, deformity or signs of injury. Cervical back: No rigidity. No muscular tenderness. Right lower leg: No edema. Left lower leg: No edema. Comments: Chest with a bruise about 14 cm X 4 cm midline no tenderness no signs of infection. Lymphadenopathy:      Cervical: No cervical adenopathy. Skin:     General: Skin is warm and dry. Capillary Refill: Capillary refill takes 2 to 3 seconds. Findings: No bruising, lesion or rash. Neurological:      General: No focal deficit present. Mental Status: He is alert and oriented to person, place, and time. Motor: Weakness present. Gait: Gait abnormal.   Psychiatric:         Attention and Perception: Attention normal.         Mood and Affect: Mood normal.         Behavior: Behavior normal.         Thought Content: Thought content does not include homicidal or suicidal ideation. Thought content does not include homicidal or suicidal plan. Visual inspection:  Deformity/amputation: absent  Skin lesions/pre-ulcerative calluses: absent  Edema: right- negative, left- negative    Sensory exam:  Monofilament sensation: normal  (minimum of 5 random plantar locations tested, avoiding callused areas - > 1 area with absence of sensation is + for neuropathy)    Plus at least one of the following:  Pulses: normal,   Pinprick: Intact       Seen By:  DARREN Monreal CNP  On the basis of positive falls risk screening, assessment and plan is as follows: home safety tips provided.

## 2022-10-04 NOTE — LETTER
[facility] [address]  Phone: [phone]  Fax: [phone]    DARREN Cordova CNP    October 4, 2022    [address]  [phone]    [facility] Patient Encounter Form  CHIEF COMPLAINT  Discuss Labs (Lab review 6 month check up, no problems at this time ) and Flu Vaccine (Given today )   Adding TSH to his labs in 2 weeks    NEW AND DISCONTINUED MEDS THIS VISIT  New Prescriptions    No medications on file      Medications Discontinued During This Encounter   Medication Reason    FARXIGA 5 MG tablet Side effects      Diet: No changes  Labs: Routine  Restrictions: None  Next Appt with PCP:  Visit date in 6 month    DARREN Cordova CNP

## 2022-10-11 DIAGNOSIS — E03.8 OTHER SPECIFIED HYPOTHYROIDISM: ICD-10-CM

## 2022-10-11 LAB
ANION GAP SERPL CALCULATED.3IONS-SCNC: 11 MMOL/L (ref 7–16)
BASOPHILS ABSOLUTE: 0.04 E9/L (ref 0–0.2)
BASOPHILS RELATIVE PERCENT: 0.4 % (ref 0–2)
BUN BLDV-MCNC: 21 MG/DL (ref 6–23)
CALCIUM SERPL-MCNC: 9.3 MG/DL (ref 8.6–10.2)
CHLORIDE BLD-SCNC: 102 MMOL/L (ref 98–107)
CO2: 24 MMOL/L (ref 22–29)
CREAT SERPL-MCNC: 1.3 MG/DL (ref 0.7–1.2)
CREATININE URINE: 80 MG/DL (ref 40–278)
EOSINOPHILS ABSOLUTE: 0.13 E9/L (ref 0.05–0.5)
EOSINOPHILS RELATIVE PERCENT: 1.4 % (ref 0–6)
GFR AFRICAN AMERICAN: >60
GFR NON-AFRICAN AMERICAN: 54 ML/MIN/1.73
GLUCOSE BLD-MCNC: 90 MG/DL (ref 74–99)
HCT VFR BLD CALC: 49.1 % (ref 37–54)
HEMOGLOBIN: 15.4 G/DL (ref 12.5–16.5)
IMMATURE GRANULOCYTES #: 0.06 E9/L
IMMATURE GRANULOCYTES %: 0.6 % (ref 0–5)
LYMPHOCYTES ABSOLUTE: 1.95 E9/L (ref 1.5–4)
LYMPHOCYTES RELATIVE PERCENT: 20.6 % (ref 20–42)
MAGNESIUM: 1.8 MG/DL (ref 1.6–2.6)
MCH RBC QN AUTO: 32.9 PG (ref 26–35)
MCHC RBC AUTO-ENTMCNC: 31.4 % (ref 32–34.5)
MCV RBC AUTO: 104.9 FL (ref 80–99.9)
MONOCYTES ABSOLUTE: 0.92 E9/L (ref 0.1–0.95)
MONOCYTES RELATIVE PERCENT: 9.7 % (ref 2–12)
NEUTROPHILS ABSOLUTE: 6.35 E9/L (ref 1.8–7.3)
NEUTROPHILS RELATIVE PERCENT: 67.3 % (ref 43–80)
PDW BLD-RTO: 14.4 FL (ref 11.5–15)
PHOSPHORUS: 3.3 MG/DL (ref 2.5–4.5)
PLATELET # BLD: 293 E9/L (ref 130–450)
PMV BLD AUTO: 9.4 FL (ref 7–12)
POTASSIUM SERPL-SCNC: 4.9 MMOL/L (ref 3.5–5)
PROTEIN PROTEIN: 23 MG/DL (ref 0–12)
PROTEIN/CREAT RATIO: 0.3
PROTEIN/CREAT RATIO: 0.3 (ref 0–0.2)
RBC # BLD: 4.68 E12/L (ref 3.8–5.8)
SODIUM BLD-SCNC: 137 MMOL/L (ref 132–146)
TSH SERPL DL<=0.05 MIU/L-ACNC: 2.79 UIU/ML (ref 0.27–4.2)
URIC ACID, SERUM: 5.1 MG/DL (ref 3.4–7)
WBC # BLD: 9.5 E9/L (ref 4.5–11.5)

## 2022-11-02 ENCOUNTER — TELEPHONE (OUTPATIENT)
Dept: FAMILY MEDICINE CLINIC | Age: 77
End: 2022-11-02

## 2022-11-02 RX ORDER — TRAMADOL HYDROCHLORIDE 50 MG/1
50 TABLET ORAL 3 TIMES DAILY
COMMUNITY
Start: 2022-10-24

## 2022-11-11 ENCOUNTER — OFFICE VISIT (OUTPATIENT)
Dept: ORTHOPEDIC SURGERY | Age: 77
End: 2022-11-11
Payer: MEDICARE

## 2022-11-11 VITALS — BODY MASS INDEX: 40.15 KG/M2 | HEIGHT: 65 IN | WEIGHT: 241 LBS

## 2022-11-11 DIAGNOSIS — G56.01 CARPAL TUNNEL SYNDROME OF RIGHT WRIST: Primary | ICD-10-CM

## 2022-11-11 DIAGNOSIS — M19.031 ARTHRITIS OF RIGHT WRIST: ICD-10-CM

## 2022-11-11 PROCEDURE — 20526 THER INJECTION CARP TUNNEL: CPT | Performed by: PHYSICIAN ASSISTANT

## 2022-11-11 PROCEDURE — 20605 DRAIN/INJ JOINT/BURSA W/O US: CPT | Performed by: PHYSICIAN ASSISTANT

## 2022-11-11 RX ORDER — BETAMETHASONE SODIUM PHOSPHATE AND BETAMETHASONE ACETATE 3; 3 MG/ML; MG/ML
12 INJECTION, SUSPENSION INTRA-ARTICULAR; INTRALESIONAL; INTRAMUSCULAR; SOFT TISSUE ONCE
Status: COMPLETED | OUTPATIENT
Start: 2022-11-11 | End: 2022-11-11

## 2022-11-11 RX ADMIN — BETAMETHASONE SODIUM PHOSPHATE AND BETAMETHASONE ACETATE 12 MG: 3; 3 INJECTION, SUSPENSION INTRA-ARTICULAR; INTRALESIONAL; INTRAMUSCULAR; SOFT TISSUE at 10:43

## 2022-11-11 NOTE — PROGRESS NOTES
Department of Orthopedic Surgery  History and Physical      CHIEF COMPLAINT:  Right wrist pain    HISTORY OF PRESENT ILLNESS:                The patient is a 69 y.o. male who presents with right wrist pain. Patient at times inconsistent with history, caretaker in room and helped with providing history. Pt states he has had multiple falls since July of last year and has had ongoing wrist pain since. Pain is mostly located to the dorsal aspect of the thumb and wrist. He does not some occasional numbness and tingling into the hand. Pain is made worse with extension of the wrist and supporting his weight on his walker. Denies any prior injury to the wrist.     Patient had an EMG/NCS dated 12/14/21    Right median nerve motor latency: 5.99  Right ulnar nerve velocity: 43 below the elbow 30 above the elbow  Right median nerve sensory latency: 4.84    EMG portion of the exam demonstrates decreased recruitment pattern in the right triceps brachii. 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 He states this did help his symptoms. He states he does have return of numbness and tingling to the hand and pain to the back of the wrist.  He states it feels like needles are in his fingertips. The pain to the back of the wrist is his biggest complaint. He is requesting repeat injections at today's visit along with his caregiver. He is uninterested in surgical management. He denies any new injury or complaints.        Past Medical History:        Diagnosis Date    Chronic kidney disease (CKD), stage III (moderate) (HCC)     Diabetes mellitus (HCC)     DJD (degenerative joint disease)     Facies suggestive of Down syndrome     Hearing impaired     Hyperlipidemia     Hypertension     Hypothyroid     Mental deficiency Urge urinary incontinence      Past Surgical History:        Procedure Laterality Date    COLONOSCOPY      COLONOSCOPY N/A 8/11/2021    COLONOSCOPY DIAGNOSTIC performed by Armani Saeed DO at [facility] ENDOSCOPY     Current Medications:   Current Facility-Administered Medications: betamethasone acetate-betamethasone sodium phosphate (CELESTONE) injection 12 mg, 12 mg, Intra-artICUlar, Once  Allergies:  Patient has no known allergies. Social History:   TOBACCO:   reports that he has never smoked. He has never used smokeless tobacco.  ETOH:   reports no history of alcohol use. DRUGS:   reports no history of drug use. ACTIVITIES OF DAILY LIVING:    OCCUPATION:    Family History:   History reviewed. No pertinent family history. REVIEW OF SYSTEMS:  CONSTITUTIONAL:  negative  HEENT:  negative  RESPIRATORY:  negative  CARDIOVASCULAR:  negative  MUSCULOSKELETAL:  Right wrist pain  NEUROLOGICAL:  negative  BEHAVIOR/PSYCH:  negative    PHYSICAL EXAM:    VITALS:  Ht 5' 5\" (1.651 m)   Wt 241 lb (109.3 kg)   BMI 40.10 kg/m²   CONSTITUTIONAL:  awake, alert, cooperative, no apparent distress, and appears stated age  EYES:  sclera clear, conjunctiva normal  ENT:  Normocephalic, without obvious abnormality, atraumatic  NEUROLOGIC:  Awake, alert, oriented to name, place and time. Cranial nerves II-XII are grossly intact. Motor is 5 out of 5 bilaterally. Sensory is intact.  gait is normal.  MUSCULOSKELETAL:    RUE: Chronic ulnar deviated deformity to the wrist. Wrist extension to 15, flexion to 25, radial deviation 0, ulnar deviation 10. TTP about the dorsal lunate bossing. Positive Tinel's and micah's. Stiffness with flexion and extension of the fingers. Sensation intact to entire hand. AIN, PIN and ulnar motor function intact to hand.      DATA:    CBC:   Lab Results   Component Value Date/Time    WBC 9.5 10/11/2022 12:15 PM    RBC 4.68 10/11/2022 12:15 PM    HGB 15.4 10/11/2022 12:15 PM    HCT 49.1 10/11/2022 12:15 PM .9 10/11/2022 12:15 PM    MCH 32.9 10/11/2022 12:15 PM    MCHC 31.4 10/11/2022 12:15 PM    RDW 14.4 10/11/2022 12:15 PM     10/11/2022 12:15 PM    MPV 9.4 10/11/2022 12:15 PM     PT/INR:  No results found for: PROTIME, INR    Xray: x-rays of the right wrist were obtained today in the office and reviewed with the patient. 3 views: AP/lateral/oblique : demonstrate end-stage SLAC wrist. No acute fractures  Impression: end stage SLAC wrist       IMPRESSION:  Right SLAC wrist  Right carpal tunnel syndrome  Sensorimotor polyneuropathy  Right C7-C8 chronic radiculopathy  Chronic kidney disease  Mental deficiency  Diabetes      PLAN:  Discussed findings with patient and his caregiver. Patient and caregiver requesting repeat injections at today's visit. Repeat injection was provided to the right carpal tunnel and right dorsal wrist.  Patient does not desire any surgical management at this time. Patient to follow-up every 3 months as needed. All questions answered. Procedure Note Carpal Tunnel Injection    The right carpal tunnel was identified as the injection site. The risk and benefits of a cortisone injection were explained and the patient consented to the injection. Under sterile conditions, the carpal canal was injected with a mixture of 1 mL of 1% Lidocaine and 1 mL of 6 mg/mL Betamethasone without complication. A sterile bandage was applied. Procedure Note Wrist Cortisone Injection    The right wrist was identified as the injection site. The risk and benefits of a cortisone injection were explained and the patient consented to the injection. Under sterile conditions, the wrist was injected with a mixture of 1 mL of 1% Lidocaine and 1 mL of 6 mg/mL Betamethasone without complication. A sterile bandage was applied.

## 2022-12-02 DIAGNOSIS — M15.9 PRIMARY OSTEOARTHRITIS INVOLVING MULTIPLE JOINTS: Primary | ICD-10-CM

## 2022-12-03 RX ORDER — TRAMADOL HYDROCHLORIDE 50 MG/1
TABLET ORAL
Qty: 93 TABLET | Refills: 5 | Status: SHIPPED | OUTPATIENT
Start: 2022-12-03 | End: 2023-01-02

## 2023-01-09 DIAGNOSIS — S30.1XXA TRAUMATIC ECCHYMOSIS OF ABDOMINAL WALL, INITIAL ENCOUNTER: ICD-10-CM

## 2023-01-09 DIAGNOSIS — R23.3 ABNORMAL BRUISING: Primary | ICD-10-CM

## 2023-02-02 DIAGNOSIS — L29.8 CHRONIC PRURITIC RASH IN ADULT: Primary | ICD-10-CM

## 2023-03-10 ENCOUNTER — OFFICE VISIT (OUTPATIENT)
Dept: ORTHOPEDIC SURGERY | Age: 78
End: 2023-03-10

## 2023-03-10 VITALS — HEIGHT: 65 IN | WEIGHT: 240 LBS | BODY MASS INDEX: 39.99 KG/M2

## 2023-03-10 DIAGNOSIS — G56.01 CARPAL TUNNEL SYNDROME OF RIGHT WRIST: Primary | ICD-10-CM

## 2023-03-10 DIAGNOSIS — M19.031 ARTHRITIS OF RIGHT WRIST: ICD-10-CM

## 2023-03-10 DIAGNOSIS — E66.01 SEVERE OBESITY (BMI 35.0-39.9) WITH COMORBIDITY (HCC): ICD-10-CM

## 2023-03-10 RX ORDER — TRIAMCINOLONE ACETONIDE 1 MG/G
1 CREAM TOPICAL DAILY PRN
COMMUNITY
Start: 2023-02-16

## 2023-03-10 RX ORDER — BETAMETHASONE SODIUM PHOSPHATE AND BETAMETHASONE ACETATE 3; 3 MG/ML; MG/ML
12 INJECTION, SUSPENSION INTRA-ARTICULAR; INTRALESIONAL; INTRAMUSCULAR; SOFT TISSUE ONCE
Status: COMPLETED | OUTPATIENT
Start: 2023-03-10 | End: 2023-03-10

## 2023-03-10 RX ADMIN — BETAMETHASONE SODIUM PHOSPHATE AND BETAMETHASONE ACETATE 12 MG: 3; 3 INJECTION, SUSPENSION INTRA-ARTICULAR; INTRALESIONAL; INTRAMUSCULAR; SOFT TISSUE at 11:00

## 2023-03-10 NOTE — PROGRESS NOTES
Department of Orthopedic Surgery  History and Physical      CHIEF COMPLAINT:  Right wrist pain    HISTORY OF PRESENT ILLNESS:                The patient is a 69 y.o. male who presents with right wrist pain. Patient at times inconsistent with history, caretaker in room and helped with providing history. Pt states he has had multiple falls since July of last year and has had ongoing wrist pain since. Pain is mostly located to the dorsal aspect of the thumb and wrist. He does not some occasional numbness and tingling into the hand. Pain is made worse with extension of the wrist and supporting his weight on his walker. Denies any prior injury to the wrist.     Patient had an EMG/NCS dated 12/14/21    Right median nerve motor latency: 5.99  Right ulnar nerve velocity: 43 below the elbow 30 above the elbow  Right median nerve sensory latency: 4.84    EMG portion of the exam demonstrates decreased recruitment pattern in the right triceps brachii. This is consistent with moderate right carpal tunnel syndrome along with chronic right C7 and C8 radiculopathy and sensory motor polyneuropathy    4 months ago the patient had a cortisone injection to the right carpal tunnel and right dorsal wrist.  Patient and caregiver states that he did not start complaining of pain to his right hand along with numbness and tingling to his right hand until the last week. They follow-up today for repeat injections to the right upper extremity. They do not desire any surgical management at this time. He reports pain with any use of his right upper extremity. He denies any new injury or complaints.     Past Medical History:        Diagnosis Date    Chronic kidney disease (CKD), stage III (moderate) (HCC)     Diabetes mellitus (HCC)     DJD (degenerative joint disease)     Facies suggestive of Down syndrome     Hearing impaired     Hyperlipidemia     Hypertension     Hypothyroid     Mental deficiency     Urge urinary incontinence      Past Surgical History:        Procedure Laterality Date    COLONOSCOPY      COLONOSCOPY N/A 8/11/2021    COLONOSCOPY DIAGNOSTIC performed by Mike Zurita DO at [facility] ENDOSCOPY     Current Medications:   Current Facility-Administered Medications: betamethasone acetate-betamethasone sodium phosphate (CELESTONE) injection 12 mg, 12 mg, Intra-artICUlar, Once  Allergies:  Patient has no known allergies.    Social History:   TOBACCO:   reports that he has never smoked. He has never used smokeless tobacco.  ETOH:   reports no history of alcohol use.  DRUGS:   reports no history of drug use.  ACTIVITIES OF DAILY LIVING:    OCCUPATION:    Family History:   History reviewed. No pertinent family history.    REVIEW OF SYSTEMS:  CONSTITUTIONAL:  negative  HEENT:  negative  RESPIRATORY:  negative  CARDIOVASCULAR:  negative  MUSCULOSKELETAL:  Right wrist pain  NEUROLOGICAL:  negative  BEHAVIOR/PSYCH:  negative    PHYSICAL EXAM:    VITALS:  Ht 5' 5\" (1.651 m)   Wt 240 lb (108.9 kg)   BMI 39.94 kg/m²   CONSTITUTIONAL:  awake, alert, cooperative, no apparent distress, and appears stated age  EYES:  sclera clear, conjunctiva normal  ENT:  Normocephalic, without obvious abnormality, atraumatic  NEUROLOGIC:  Awake, alert, oriented to name, place and time.  Cranial nerves II-XII are grossly intact.  Motor is 5 out of 5 bilaterally.  Sensory is intact.  gait is normal.  MUSCULOSKELETAL:    RUE: Chronic ulnar deviated deformity to the wrist. Wrist extension to 10, flexion to 15, radial deviation 0, ulnar deviation 10. TTP about the dorsal lunate bossing.  Positive Tinel's and micah's. Stiffness with flexion and extension of the fingers. Sensation intact to entire hand. AIN, PIN and ulnar motor function intact to hand.     DATA:    CBC:   Lab Results   Component Value Date/Time    WBC 9.5 10/11/2022 12:15 PM    RBC 4.68 10/11/2022 12:15 PM    HGB 15.4 10/11/2022 12:15 PM    HCT 49.1 10/11/2022 12:15 PM    .9 10/11/2022 12:15 PM     MCH 32.9 10/11/2022 12:15 PM    MCHC 31.4 10/11/2022 12:15 PM    RDW 14.4 10/11/2022 12:15 PM     10/11/2022 12:15 PM    MPV 9.4 10/11/2022 12:15 PM     PT/INR:  No results found for: PROTIME, INR      IMPRESSION:  Right SLAC wrist  Right carpal tunnel syndrome  Sensorimotor polyneuropathy  Right C7-C8 chronic radiculopathy  Chronic kidney disease  Mental deficiency  Diabetes      PLAN:  Discussed findings with patient and his caregiver. Patient and caregiver uninterested in surgical management at this time. They would like to proceed with repeat injections to the right wrist and right carpal tunnel at today's visit. Patient consented and this was tolerated well. He may repeat injections every 3 months as needed. Briefly discussed surgical options in the future if necessary. All questions answered. Procedure Note Carpal Tunnel Injection    The right carpal tunnel was identified as the injection site. The risk and benefits of a cortisone injection were explained and the patient consented to the injection. Under sterile conditions, the carpal canal was injected with a mixture of 1 mL of 1% Lidocaine and 1 mL of 6 mg/mL Betamethasone without complication. A sterile bandage was applied. Procedure Note Wrist Cortisone Injection    The right wrist was identified as the injection site. The risk and benefits of a cortisone injection were explained and the patient consented to the injection. Under sterile conditions, the wrist was injected with a mixture of 1 mL of 1% Lidocaine and 1 mL of 6 mg/mL Betamethasone without complication. A sterile bandage was applied.

## 2023-03-30 ENCOUNTER — NURSE ONLY (OUTPATIENT)
Dept: FAMILY MEDICINE CLINIC | Age: 78
End: 2023-03-30

## 2023-03-30 DIAGNOSIS — I12.9 HYPERTENSIVE RENAL DISEASE: ICD-10-CM

## 2023-03-30 DIAGNOSIS — N18.32 TYPE 2 DIABETES MELLITUS WITH STAGE 3B CHRONIC KIDNEY DISEASE, WITHOUT LONG-TERM CURRENT USE OF INSULIN (HCC): Primary | ICD-10-CM

## 2023-03-30 DIAGNOSIS — E78.5 HYPERLIPIDEMIA, UNSPECIFIED HYPERLIPIDEMIA TYPE: ICD-10-CM

## 2023-03-30 DIAGNOSIS — N18.32 TYPE 2 DIABETES MELLITUS WITH STAGE 3B CHRONIC KIDNEY DISEASE, WITHOUT LONG-TERM CURRENT USE OF INSULIN (HCC): ICD-10-CM

## 2023-03-30 DIAGNOSIS — E11.22 TYPE 2 DIABETES MELLITUS WITH STAGE 3B CHRONIC KIDNEY DISEASE, WITHOUT LONG-TERM CURRENT USE OF INSULIN (HCC): Primary | ICD-10-CM

## 2023-03-30 DIAGNOSIS — E11.22 TYPE 2 DIABETES MELLITUS WITH STAGE 3B CHRONIC KIDNEY DISEASE, WITHOUT LONG-TERM CURRENT USE OF INSULIN (HCC): ICD-10-CM

## 2023-03-30 DIAGNOSIS — N18.32 STAGE 3B CHRONIC KIDNEY DISEASE (HCC): ICD-10-CM

## 2023-03-30 DIAGNOSIS — Z12.5 ENCOUNTER FOR PROSTATE CANCER SCREENING: ICD-10-CM

## 2023-03-30 LAB
ALBUMIN SERPL-MCNC: 3.8 G/DL (ref 3.5–5.2)
ALP SERPL-CCNC: 129 U/L (ref 40–129)
ALT SERPL-CCNC: 22 U/L (ref 0–40)
ANION GAP SERPL CALCULATED.3IONS-SCNC: 9 MMOL/L (ref 7–16)
AST SERPL-CCNC: 24 U/L (ref 0–39)
BACTERIA URNS QL MICRO: ABNORMAL /HPF
BASOPHILS # BLD: 0.04 E9/L (ref 0–0.2)
BASOPHILS NFR BLD: 0.4 % (ref 0–2)
BILIRUB SERPL-MCNC: 0.7 MG/DL (ref 0–1.2)
BILIRUB UR QL STRIP: NEGATIVE
BUN SERPL-MCNC: 21 MG/DL (ref 6–23)
CALCIUM SERPL-MCNC: 9.5 MG/DL (ref 8.6–10.2)
CHLORIDE SERPL-SCNC: 105 MMOL/L (ref 98–107)
CHOLESTEROL, TOTAL: 150 MG/DL (ref 0–199)
CLARITY UR: ABNORMAL
CO2 SERPL-SCNC: 25 MMOL/L (ref 22–29)
COLOR UR: YELLOW
CREAT SERPL-MCNC: 1.2 MG/DL (ref 0.7–1.2)
EOSINOPHIL # BLD: 0.12 E9/L (ref 0.05–0.5)
EOSINOPHIL NFR BLD: 1.2 % (ref 0–6)
ERYTHROCYTE [DISTWIDTH] IN BLOOD BY AUTOMATED COUNT: 14.1 FL (ref 11.5–15)
GLUCOSE FASTING: 121 MG/DL (ref 74–99)
GLUCOSE UR STRIP-MCNC: NEGATIVE MG/DL
HCT VFR BLD AUTO: 49.5 % (ref 37–54)
HDLC SERPL-MCNC: 54 MG/DL
HGB BLD-MCNC: 15.6 G/DL (ref 12.5–16.5)
HGB UR QL STRIP: ABNORMAL
IMM GRANULOCYTES # BLD: 0.05 E9/L
IMM GRANULOCYTES NFR BLD: 0.5 % (ref 0–5)
KETONES UR STRIP-MCNC: NEGATIVE MG/DL
LDLC SERPL CALC-MCNC: 83 MG/DL (ref 0–99)
LEUKOCYTE ESTERASE UR QL STRIP: ABNORMAL
LYMPHOCYTES # BLD: 1.8 E9/L (ref 1.5–4)
LYMPHOCYTES NFR BLD: 17.3 % (ref 20–42)
MCH RBC QN AUTO: 32.4 PG (ref 26–35)
MCHC RBC AUTO-ENTMCNC: 31.5 % (ref 32–34.5)
MCV RBC AUTO: 102.7 FL (ref 80–99.9)
MICROALBUMIN UR-MCNC: 346.6 MG/L
MONOCYTES # BLD: 0.94 E9/L (ref 0.1–0.95)
MONOCYTES NFR BLD: 9 % (ref 2–12)
NEUTROPHILS # BLD: 7.44 E9/L (ref 1.8–7.3)
NEUTS SEG NFR BLD: 71.6 % (ref 43–80)
NITRITE UR QL STRIP: NEGATIVE
PH UR STRIP: 6.5 [PH] (ref 5–9)
PLATELET # BLD AUTO: 284 E9/L (ref 130–450)
PMV BLD AUTO: 9.6 FL (ref 7–12)
POTASSIUM SERPL-SCNC: 4.8 MMOL/L (ref 3.5–5)
PROT SERPL-MCNC: 7 G/DL (ref 6.4–8.3)
PROT UR STRIP-MCNC: 30 MG/DL
RBC # BLD AUTO: 4.82 E12/L (ref 3.8–5.8)
RBC #/AREA URNS HPF: ABNORMAL /HPF (ref 0–2)
SODIUM SERPL-SCNC: 139 MMOL/L (ref 132–146)
SP GR UR STRIP: 1.01 (ref 1–1.03)
TRIGL SERPL-MCNC: 67 MG/DL (ref 0–149)
UROBILINOGEN UR STRIP-ACNC: 0.2 E.U./DL
VLDLC SERPL CALC-MCNC: 13 MG/DL
WBC # BLD: 10.4 E9/L (ref 4.5–11.5)
WBC #/AREA URNS HPF: >20 /HPF (ref 0–5)

## 2023-03-31 LAB
HBA1C MFR BLD: 6.6 % (ref 4–5.6)
PSA SERPL-MCNC: 1.19 NG/ML (ref 0–4)

## 2023-04-04 ENCOUNTER — OFFICE VISIT (OUTPATIENT)
Dept: FAMILY MEDICINE CLINIC | Age: 78
End: 2023-04-04

## 2023-04-04 VITALS
WEIGHT: 239 LBS | OXYGEN SATURATION: 97 % | DIASTOLIC BLOOD PRESSURE: 75 MMHG | HEIGHT: 65 IN | HEART RATE: 97 BPM | RESPIRATION RATE: 18 BRPM | BODY MASS INDEX: 39.82 KG/M2 | TEMPERATURE: 97.9 F | SYSTOLIC BLOOD PRESSURE: 138 MMHG

## 2023-04-04 VITALS
TEMPERATURE: 97.9 F | BODY MASS INDEX: 39.82 KG/M2 | OXYGEN SATURATION: 97 % | SYSTOLIC BLOOD PRESSURE: 138 MMHG | HEART RATE: 97 BPM | WEIGHT: 239 LBS | RESPIRATION RATE: 18 BRPM | HEIGHT: 65 IN | DIASTOLIC BLOOD PRESSURE: 75 MMHG

## 2023-04-04 DIAGNOSIS — E03.8 OTHER SPECIFIED HYPOTHYROIDISM: ICD-10-CM

## 2023-04-04 DIAGNOSIS — E66.01 CLASS 3 SEVERE OBESITY DUE TO EXCESS CALORIES WITH SERIOUS COMORBIDITY AND BODY MASS INDEX (BMI) OF 45.0 TO 49.9 IN ADULT (HCC): ICD-10-CM

## 2023-04-04 DIAGNOSIS — N18.32 TYPE 2 DIABETES MELLITUS WITH STAGE 3B CHRONIC KIDNEY DISEASE, WITHOUT LONG-TERM CURRENT USE OF INSULIN (HCC): ICD-10-CM

## 2023-04-04 DIAGNOSIS — E78.5 HYPERLIPIDEMIA, UNSPECIFIED HYPERLIPIDEMIA TYPE: ICD-10-CM

## 2023-04-04 DIAGNOSIS — N18.32 STAGE 3B CHRONIC KIDNEY DISEASE (HCC): ICD-10-CM

## 2023-04-04 DIAGNOSIS — I12.9 HYPERTENSIVE RENAL DISEASE: ICD-10-CM

## 2023-04-04 DIAGNOSIS — E11.22 TYPE 2 DIABETES MELLITUS WITH STAGE 3B CHRONIC KIDNEY DISEASE, WITHOUT LONG-TERM CURRENT USE OF INSULIN (HCC): ICD-10-CM

## 2023-04-04 DIAGNOSIS — Z00.00 MEDICARE ANNUAL WELLNESS VISIT, SUBSEQUENT: Primary | ICD-10-CM

## 2023-04-04 DIAGNOSIS — E55.9 VITAMIN D DEFICIENCY: ICD-10-CM

## 2023-04-04 DIAGNOSIS — N39.46 MIXED STRESS AND URGE URINARY INCONTINENCE: Primary | ICD-10-CM

## 2023-04-04 DIAGNOSIS — M15.9 PRIMARY OSTEOARTHRITIS INVOLVING MULTIPLE JOINTS: ICD-10-CM

## 2023-04-04 DIAGNOSIS — F79 MENTAL DEFICIENCY: ICD-10-CM

## 2023-04-04 DIAGNOSIS — Z12.5 ENCOUNTER FOR SCREENING FOR MALIGNANT NEOPLASM OF PROSTATE: ICD-10-CM

## 2023-04-04 DIAGNOSIS — N39.46 MIXED STRESS AND URGE URINARY INCONTINENCE: ICD-10-CM

## 2023-04-04 RX ORDER — OXYBUTYNIN CHLORIDE 10 MG/1
10 TABLET, EXTENDED RELEASE ORAL DAILY
Qty: 30 TABLET | Refills: 3 | Status: SHIPPED | OUTPATIENT
Start: 2023-04-04

## 2023-04-04 SDOH — ECONOMIC STABILITY: HOUSING INSECURITY
IN THE LAST 12 MONTHS, WAS THERE A TIME WHEN YOU DID NOT HAVE A STEADY PLACE TO SLEEP OR SLEPT IN A SHELTER (INCLUDING NOW)?: NO

## 2023-04-04 SDOH — ECONOMIC STABILITY: FOOD INSECURITY: WITHIN THE PAST 12 MONTHS, YOU WORRIED THAT YOUR FOOD WOULD RUN OUT BEFORE YOU GOT MONEY TO BUY MORE.: NEVER TRUE

## 2023-04-04 SDOH — ECONOMIC STABILITY: INCOME INSECURITY: HOW HARD IS IT FOR YOU TO PAY FOR THE VERY BASICS LIKE FOOD, HOUSING, MEDICAL CARE, AND HEATING?: NOT HARD AT ALL

## 2023-04-04 SDOH — ECONOMIC STABILITY: FOOD INSECURITY: WITHIN THE PAST 12 MONTHS, THE FOOD YOU BOUGHT JUST DIDN'T LAST AND YOU DIDN'T HAVE MONEY TO GET MORE.: NEVER TRUE

## 2023-04-04 ASSESSMENT — LIFESTYLE VARIABLES
HOW MANY STANDARD DRINKS CONTAINING ALCOHOL DO YOU HAVE ON A TYPICAL DAY: PATIENT DOES NOT DRINK
HOW OFTEN DO YOU HAVE A DRINK CONTAINING ALCOHOL: NEVER

## 2023-04-04 ASSESSMENT — ENCOUNTER SYMPTOMS
DIARRHEA: 0
SINUS PAIN: 0
BLOOD IN STOOL: 0
STRIDOR: 0
COLOR CHANGE: 0
APNEA: 0
CHOKING: 0
COUGH: 0
VOMITING: 0
FACIAL SWELLING: 0
PHOTOPHOBIA: 0
EYE DISCHARGE: 0
WHEEZING: 0
SORE THROAT: 0
EYE PAIN: 0
RECTAL PAIN: 0
EYE ITCHING: 0
SINUS PRESSURE: 0
CONSTIPATION: 0
ABDOMINAL PAIN: 0
ABDOMINAL DISTENTION: 0
VOICE CHANGE: 0
NAUSEA: 0
SHORTNESS OF BREATH: 0
CHEST TIGHTNESS: 0
TROUBLE SWALLOWING: 0
ANAL BLEEDING: 0
EYE REDNESS: 0
BACK PAIN: 0
RHINORRHEA: 0

## 2023-04-04 ASSESSMENT — PATIENT HEALTH QUESTIONNAIRE - PHQ9
SUM OF ALL RESPONSES TO PHQ QUESTIONS 1-9: 0
SUM OF ALL RESPONSES TO PHQ QUESTIONS 1-9: 0
1. LITTLE INTEREST OR PLEASURE IN DOING THINGS: 0
SUM OF ALL RESPONSES TO PHQ QUESTIONS 1-9: 0
SUM OF ALL RESPONSES TO PHQ9 QUESTIONS 1 & 2: 0
2. FEELING DOWN, DEPRESSED OR HOPELESS: 0
SUM OF ALL RESPONSES TO PHQ QUESTIONS 1-9: 0

## 2023-04-04 NOTE — PROGRESS NOTES
Medicare Annual Wellness Visit    Lambert Neal is here for Medicare AWV (Awv today)    Assessment & Plan   Medicare annual wellness visit, subsequent      Recommendations for Preventive Services Due: see orders and patient instructions/AVS.  Recommended screening schedule for the next 5-10 years is provided to the patient in written form: see Patient Instructions/AVS.     Return for Medicare Annual Wellness Visit in 1 year. Subjective       Patient's complete Health Risk Assessment and screening values have been reviewed and are found in Flowsheets. The following problems were reviewed today and where indicated follow up appointments were made and/or referrals ordered. Positive Risk Factor Screenings with Interventions:                 Weight and Activity:  Physical Activity: Inactive    Days of Exercise per Week: 0 days    Minutes of Exercise per Session: 0 min     On average, how many days per week do you engage in moderate to strenuous exercise (like a brisk walk)?: 0 days  Have you lost any weight without trying in the past 3 months?: No  Body mass index: (!) 39.77      Inactivity Interventions:  Patient advised to follow up in the office for further evaluation and treatment  Obesity Interventions:  Patient advised to follow-up in this office for further evaluation and treatment           Hearing Screen:  Do you or your family notice any trouble with your hearing that hasn't been managed with hearing aids?: (!) Yes    Interventions:  Referred to Audiology    Vision Screen:  Do you have difficulty driving, watching TV, or doing any of your daily activities because of your eyesight?: (!) Yes  Have you had an eye exam within the past year?: Yes  No results found.     Interventions:   Patient encouraged to make appointment with their eye specialist     ADL's:   Patient reports needing help with:  Select all that apply: (!) Dressing, Grooming, Bathing, Toileting, Walking/Balance  Interventions:  Patient

## 2023-04-04 NOTE — PATIENT INSTRUCTIONS
day. Dark green, deep orange, red, or yellow fruits and vegetables are especially good for you. Examples include spinach, carrots, peaches, and berries.     Foods high in fiber can reduce your cholesterol and provide important vitamins and minerals. High-fiber foods include whole-grain cereals and breads, oatmeal, beans, brown rice, citrus fruits, and apples.     Eat lean proteins. Heart-healthy proteins include seafood, lean meats and poultry, eggs, beans, peas, nuts, seeds, and soy products.     Limit drinks and foods with added sugar. These include candy, desserts, and soda pop. Lifestyle changes    If your doctor recommends it, get more exercise. Walking is a good choice. Bit by bit, increase the amount you walk every day. Try for at least 30 minutes on most days of the week. You also may want to swim, bike, or do other activities.     Do not smoke. If you need help quitting, talk to your doctor about stop-smoking programs and medicines. These can increase your chances of quitting for good. Quitting smoking may be the most important step you can take to protect your heart. It is never too late to quit.     Limit alcohol to 2 drinks a day for men and 1 drink a day for women. Too much alcohol can cause health problems.     Manage other health problems such as diabetes, high blood pressure, and high cholesterol. If you think you may have a problem with alcohol or drug use, talk to your doctor. Medicines    Take your medicines exactly as prescribed. Call your doctor if you think you are having a problem with your medicine.     If your doctor recommends aspirin, take the amount directed each day. Make sure you take aspirin and not another kind of pain reliever, such as acetaminophen (Tylenol). When should you call for help? Call 911 if you have symptoms of a heart attack.  These may include:    Chest pain or pressure, or a strange feeling in the chest.     Sweating.     Shortness of breath.     Pain, pressure,

## 2023-04-04 NOTE — PROGRESS NOTES
Number of Places Lived in the Last Year: Not on file    Unstable Housing in the Last Year: No     Patient Active Problem List   Diagnosis    Facies suggestive of Down syndrome    Hypothyroid    Obesity    DJD (degenerative joint disease)    Mental deficiency    Unspecified abnormalities of gait and mobility    Cardiac murmur, unspecified    Hypertensive renal disease    Vitamin D deficiency    Type 2 diabetes mellitus with stage 3b chronic kidney disease, without long-term current use of insulin (HCC)    Hx of colonic polyps    Chronic renal disease, stage III ([facility] [184174]        Vitals:    04/04/23 1112   BP: 138/75   Pulse: 97   Resp: 18   Temp: 97.9 °F (36.6 °C)   TempSrc: Temporal   SpO2: 97%   Weight: 239 lb (108.4 kg)   Height: 5' 5\" (1.651 m)       Physical Exam  Vitals and nursing note reviewed. Constitutional:       General: He is not in acute distress. Appearance: Normal appearance. He is obese. He is not ill-appearing, toxic-appearing or diaphoretic. HENT:      Head: Normocephalic and atraumatic. Right Ear: Tympanic membrane, ear canal and external ear normal. There is no impacted cerumen. Left Ear: Tympanic membrane, ear canal and external ear normal. There is no impacted cerumen. Nose: Nose normal. No congestion or rhinorrhea. Mouth/Throat:      Mouth: Mucous membranes are moist.      Pharynx: Oropharynx is clear. No oropharyngeal exudate or posterior oropharyngeal erythema. Eyes:      General: No scleral icterus. Right eye: No discharge. Left eye: No discharge. Extraocular Movements: Extraocular movements intact. Conjunctiva/sclera: Conjunctivae normal.      Pupils: Pupils are equal, round, and reactive to light. Neck:      Vascular: No carotid bruit. Cardiovascular:      Rate and Rhythm: Normal rate and regular rhythm. Pulses: Normal pulses. Heart sounds: Normal heart sounds. No murmur heard. No friction rub. No gallop.

## 2023-04-06 LAB
ALBUMIN SERPL-MCNC: 3.8 G/DL (ref 3.5–5.2)
ALP SERPL-CCNC: 126 U/L (ref 40–129)
ALT SERPL-CCNC: 20 U/L (ref 0–40)
ANION GAP SERPL CALCULATED.3IONS-SCNC: 14 MMOL/L (ref 7–16)
AST SERPL-CCNC: 22 U/L (ref 0–39)
BASOPHILS # BLD: 0.05 E9/L (ref 0–0.2)
BASOPHILS NFR BLD: 0.5 % (ref 0–2)
BILIRUB SERPL-MCNC: 0.7 MG/DL (ref 0–1.2)
BUN SERPL-MCNC: 26 MG/DL (ref 6–23)
CALCIUM SERPL-MCNC: 9.5 MG/DL (ref 8.6–10.2)
CHLORIDE SERPL-SCNC: 104 MMOL/L (ref 98–107)
CO2 SERPL-SCNC: 22 MMOL/L (ref 22–29)
CREAT SERPL-MCNC: 1.5 MG/DL (ref 0.7–1.2)
EOSINOPHIL # BLD: 0.1 E9/L (ref 0.05–0.5)
EOSINOPHIL NFR BLD: 1.1 % (ref 0–6)
ERYTHROCYTE [DISTWIDTH] IN BLOOD BY AUTOMATED COUNT: 13.9 FL (ref 11.5–15)
GLUCOSE SERPL-MCNC: 123 MG/DL (ref 74–99)
HCT VFR BLD AUTO: 48.8 % (ref 37–54)
HGB BLD-MCNC: 15.4 G/DL (ref 12.5–16.5)
IMM GRANULOCYTES # BLD: 0.07 E9/L
IMM GRANULOCYTES NFR BLD: 0.7 % (ref 0–5)
LYMPHOCYTES # BLD: 2.04 E9/L (ref 1.5–4)
LYMPHOCYTES NFR BLD: 21.8 % (ref 20–42)
MAGNESIUM SERPL-MCNC: 1.9 MG/DL (ref 1.6–2.6)
MCH RBC QN AUTO: 32.4 PG (ref 26–35)
MCHC RBC AUTO-ENTMCNC: 31.6 % (ref 32–34.5)
MCV RBC AUTO: 102.5 FL (ref 80–99.9)
MONOCYTES # BLD: 0.95 E9/L (ref 0.1–0.95)
MONOCYTES NFR BLD: 10.1 % (ref 2–12)
NEUTROPHILS # BLD: 6.16 E9/L (ref 1.8–7.3)
NEUTS SEG NFR BLD: 65.8 % (ref 43–80)
PLATELET # BLD AUTO: 293 E9/L (ref 130–450)
PMV BLD AUTO: 9.5 FL (ref 7–12)
POTASSIUM SERPL-SCNC: 4.5 MMOL/L (ref 3.5–5)
PROT SERPL-MCNC: 6.9 G/DL (ref 6.4–8.3)
RBC # BLD AUTO: 4.76 E12/L (ref 3.8–5.8)
SODIUM SERPL-SCNC: 140 MMOL/L (ref 132–146)
URATE SERPL-MCNC: 6 MG/DL (ref 3.4–7)
VITAMIN D 25-HYDROXY: 16 NG/ML (ref 30–100)
WBC # BLD: 9.4 E9/L (ref 4.5–11.5)

## 2023-04-27 ENCOUNTER — TELEPHONE (OUTPATIENT)
Dept: FAMILY MEDICINE CLINIC | Age: 78
End: 2023-04-27

## 2023-04-27 DIAGNOSIS — R39.81 URINARY INCONTINENCE DUE TO COGNITIVE IMPAIRMENT: ICD-10-CM

## 2023-04-27 DIAGNOSIS — N39.46 MIXED STRESS AND URGE URINARY INCONTINENCE: Primary | ICD-10-CM

## 2023-04-27 DIAGNOSIS — F79 MENTAL DEFICIENCY: ICD-10-CM

## 2023-04-27 DIAGNOSIS — R15.9 FULL INCONTINENCE-FECES: ICD-10-CM

## 2023-04-27 NOTE — TELEPHONE ENCOUNTER
Needs refill on urnary incontinence supplies to go to [facility]. (Was using [facility] but having great difficulty getting the rx right with them.)    DR. Beltran Divers   PRINT OUT THE ORDER AFTER YOU SIGN IT.

## 2023-05-03 ENCOUNTER — OFFICE VISIT (OUTPATIENT)
Dept: FAMILY MEDICINE CLINIC | Age: 78
End: 2023-05-03

## 2023-05-03 VITALS
DIASTOLIC BLOOD PRESSURE: 80 MMHG | WEIGHT: 278.9 LBS | TEMPERATURE: 97.9 F | HEIGHT: 65 IN | OXYGEN SATURATION: 98 % | BODY MASS INDEX: 46.47 KG/M2 | SYSTOLIC BLOOD PRESSURE: 130 MMHG | HEART RATE: 124 BPM

## 2023-05-03 DIAGNOSIS — N18.32 STAGE 3B CHRONIC KIDNEY DISEASE (HCC): ICD-10-CM

## 2023-05-03 DIAGNOSIS — E66.01 CLASS 3 SEVERE OBESITY DUE TO EXCESS CALORIES WITH SERIOUS COMORBIDITY AND BODY MASS INDEX (BMI) OF 45.0 TO 49.9 IN ADULT (HCC): ICD-10-CM

## 2023-05-03 DIAGNOSIS — E78.5 HYPERLIPIDEMIA, UNSPECIFIED HYPERLIPIDEMIA TYPE: ICD-10-CM

## 2023-05-03 DIAGNOSIS — N18.32 TYPE 2 DIABETES MELLITUS WITH STAGE 3B CHRONIC KIDNEY DISEASE, WITHOUT LONG-TERM CURRENT USE OF INSULIN (HCC): Primary | ICD-10-CM

## 2023-05-03 DIAGNOSIS — E11.22 TYPE 2 DIABETES MELLITUS WITH STAGE 3B CHRONIC KIDNEY DISEASE, WITHOUT LONG-TERM CURRENT USE OF INSULIN (HCC): Primary | ICD-10-CM

## 2023-05-03 DIAGNOSIS — F79 MENTAL DEFICIENCY: ICD-10-CM

## 2023-05-03 LAB
CHP ED QC CHECK: NORMAL
GLUCOSE BLD-MCNC: 137 MG/DL

## 2023-05-03 RX ORDER — LANCETS 30 GAUGE
1 EACH MISCELLANEOUS 4 TIMES DAILY
Qty: 600 EACH | Refills: 1 | Status: SHIPPED | OUTPATIENT
Start: 2023-05-03

## 2023-05-03 RX ORDER — FLUOXETINE HYDROCHLORIDE 20 MG/1
CAPSULE ORAL
COMMUNITY
Start: 2023-04-24 | End: 2023-05-03

## 2023-05-03 RX ORDER — TRAMADOL HYDROCHLORIDE 50 MG/1
TABLET ORAL
COMMUNITY
Start: 2023-04-24

## 2023-05-03 RX ORDER — GLUCOSAMINE HCL/CHONDROITIN SU 500-400 MG
CAPSULE ORAL
Qty: 100 STRIP | Refills: 5 | Status: SHIPPED | OUTPATIENT
Start: 2023-05-03

## 2023-05-03 RX ORDER — BLOOD-GLUCOSE METER
1 KIT MISCELLANEOUS DAILY
Qty: 1 KIT | Refills: 0 | Status: SHIPPED | OUTPATIENT
Start: 2023-05-03

## 2023-05-03 RX ORDER — ERGOCALCIFEROL 1.25 MG/1
50000 CAPSULE ORAL WEEKLY
Qty: 4 CAPSULE | Refills: 11 | COMMUNITY
Start: 2023-04-26 | End: 2024-04-25

## 2023-05-03 ASSESSMENT — ENCOUNTER SYMPTOMS
CONSTIPATION: 0
VOMITING: 0
DIARRHEA: 0
SINUS PRESSURE: 0
COLOR CHANGE: 0
NAUSEA: 0
ABDOMINAL PAIN: 0
ANAL BLEEDING: 0
COUGH: 0
RECTAL PAIN: 0
CHEST TIGHTNESS: 0
BLOOD IN STOOL: 0
FACIAL SWELLING: 0
SHORTNESS OF BREATH: 0
EYE DISCHARGE: 0
ABDOMINAL DISTENTION: 0
SORE THROAT: 0
EYE PAIN: 0
TROUBLE SWALLOWING: 0
STRIDOR: 0
EYE REDNESS: 0
BACK PAIN: 0
WHEEZING: 0
APNEA: 0
CHOKING: 0
VOICE CHANGE: 0
RHINORRHEA: 0
EYE ITCHING: 0
PHOTOPHOBIA: 0
SINUS PAIN: 0

## 2023-05-03 NOTE — PROGRESS NOTES
5/3/23  [facility] : 1945 Sex: male  Age: 69 y.o. Chief Complaint   Patient presents with    Diabetes     Sugars have been running high       Jozef Zabrina is seen today for blood sugars. He was at the nephrologist at an appointment the other day and had an elevated blood sugar on his lab work better. The nephrologist wanted him to have an appointment with PCP regarding these blood sugars. He suggested possibly increasing his Ozempic dose. It is noted that last A1c was 6.6 on . The patient does tend to be somewhat noncompliant with his diet. He does live at the [facility] but there is not staff there 24 hours a day to watch his meal intake. He does admit to eating lots of chocolate pudding and honey buns. Staff with him today is going to encourage buying sugar-free versions of the chocolate pudding and his drinks that he drinks. Will help him with buying sugar-free snacks. Review of Systems   Constitutional:  Negative for activity change, appetite change, chills, diaphoresis, fatigue, fever and unexpected weight change. HENT:  Negative for congestion, dental problem, drooling, ear discharge, ear pain, facial swelling, hearing loss, mouth sores, nosebleeds, postnasal drip, rhinorrhea, sinus pressure, sinus pain, sneezing, sore throat, tinnitus, trouble swallowing and voice change. Eyes:  Negative for photophobia, pain, discharge, redness, itching and visual disturbance. Respiratory:  Negative for apnea, cough, choking, chest tightness, shortness of breath, wheezing and stridor. Cardiovascular:  Negative for chest pain, palpitations and leg swelling. Gastrointestinal:  Negative for abdominal distention, abdominal pain, anal bleeding, blood in stool, constipation, diarrhea, nausea, rectal pain and vomiting. Endocrine: Negative for cold intolerance, heat intolerance, polydipsia, polyphagia and polyuria. Genitourinary:  Positive for frequency and urgency.  Negative

## 2023-05-05 ENCOUNTER — OFFICE VISIT (OUTPATIENT)
Dept: FAMILY MEDICINE CLINIC | Age: 78
End: 2023-05-05

## 2023-05-05 VITALS
TEMPERATURE: 97 F | HEIGHT: 65 IN | DIASTOLIC BLOOD PRESSURE: 78 MMHG | HEART RATE: 97 BPM | WEIGHT: 278 LBS | SYSTOLIC BLOOD PRESSURE: 122 MMHG | OXYGEN SATURATION: 98 % | BODY MASS INDEX: 46.32 KG/M2

## 2023-05-05 DIAGNOSIS — N18.32 TYPE 2 DIABETES MELLITUS WITH STAGE 3B CHRONIC KIDNEY DISEASE, WITHOUT LONG-TERM CURRENT USE OF INSULIN (HCC): ICD-10-CM

## 2023-05-05 DIAGNOSIS — F79 MENTAL DEFICIENCY: ICD-10-CM

## 2023-05-05 DIAGNOSIS — T14.8XXA OPEN WOUND OF SKIN: Primary | ICD-10-CM

## 2023-05-05 DIAGNOSIS — E11.22 TYPE 2 DIABETES MELLITUS WITH STAGE 3B CHRONIC KIDNEY DISEASE, WITHOUT LONG-TERM CURRENT USE OF INSULIN (HCC): ICD-10-CM

## 2023-05-05 RX ORDER — SEMAGLUTIDE 1.34 MG/ML
INJECTION, SOLUTION SUBCUTANEOUS
COMMUNITY
Start: 2023-05-03

## 2023-05-05 ASSESSMENT — ENCOUNTER SYMPTOMS
CHEST TIGHTNESS: 0
COUGH: 0
SHORTNESS OF BREATH: 0
BLOOD IN STOOL: 0
STRIDOR: 0
EYE ITCHING: 0
BACK PAIN: 0
SINUS PRESSURE: 0
VOMITING: 0
EYE DISCHARGE: 0
ABDOMINAL PAIN: 0
APNEA: 0
DIARRHEA: 0
COLOR CHANGE: 0
FACIAL SWELLING: 0
EYE REDNESS: 0
VOICE CHANGE: 0
SINUS PAIN: 0
PHOTOPHOBIA: 0
RECTAL PAIN: 0
SORE THROAT: 0
EYE PAIN: 0
CONSTIPATION: 0
RHINORRHEA: 0
ABDOMINAL DISTENTION: 0
WHEEZING: 0
CHOKING: 0
TROUBLE SWALLOWING: 0
NAUSEA: 0
ANAL BLEEDING: 0

## 2023-05-22 ENCOUNTER — TELEPHONE (OUTPATIENT)
Dept: FAMILY MEDICINE CLINIC | Age: 78
End: 2023-05-22

## 2023-05-22 RX ORDER — FLUOXETINE HYDROCHLORIDE 20 MG/1
20 CAPSULE ORAL DAILY
COMMUNITY

## 2023-05-30 DIAGNOSIS — M15.9 PRIMARY OSTEOARTHRITIS INVOLVING MULTIPLE JOINTS: Primary | ICD-10-CM

## 2023-05-30 RX ORDER — TRAMADOL HYDROCHLORIDE 50 MG/1
TABLET ORAL
Qty: 90 TABLET | Refills: 0 | Status: SHIPPED | OUTPATIENT
Start: 2023-05-30 | End: 2023-06-29

## 2023-06-08 ENCOUNTER — OFFICE VISIT (OUTPATIENT)
Dept: FAMILY MEDICINE CLINIC | Age: 78
End: 2023-06-08

## 2023-06-08 ENCOUNTER — TELEPHONE (OUTPATIENT)
Dept: FAMILY MEDICINE CLINIC | Age: 78
End: 2023-06-08

## 2023-06-08 VITALS
HEART RATE: 90 BPM | TEMPERATURE: 97 F | HEIGHT: 65 IN | BODY MASS INDEX: 46.65 KG/M2 | SYSTOLIC BLOOD PRESSURE: 126 MMHG | WEIGHT: 280 LBS | DIASTOLIC BLOOD PRESSURE: 80 MMHG | OXYGEN SATURATION: 98 %

## 2023-06-08 DIAGNOSIS — N18.32 TYPE 2 DIABETES MELLITUS WITH STAGE 3B CHRONIC KIDNEY DISEASE, WITHOUT LONG-TERM CURRENT USE OF INSULIN (HCC): Primary | ICD-10-CM

## 2023-06-08 DIAGNOSIS — E78.5 HYPERLIPIDEMIA, UNSPECIFIED HYPERLIPIDEMIA TYPE: ICD-10-CM

## 2023-06-08 DIAGNOSIS — E66.01 CLASS 3 SEVERE OBESITY DUE TO EXCESS CALORIES WITH SERIOUS COMORBIDITY AND BODY MASS INDEX (BMI) OF 45.0 TO 49.9 IN ADULT (HCC): ICD-10-CM

## 2023-06-08 DIAGNOSIS — R01.1 CARDIAC MURMUR, UNSPECIFIED: ICD-10-CM

## 2023-06-08 DIAGNOSIS — E03.8 OTHER SPECIFIED HYPOTHYROIDISM: ICD-10-CM

## 2023-06-08 DIAGNOSIS — R15.9 FULL INCONTINENCE-FECES: ICD-10-CM

## 2023-06-08 DIAGNOSIS — E55.9 VITAMIN D DEFICIENCY: ICD-10-CM

## 2023-06-08 DIAGNOSIS — E11.22 TYPE 2 DIABETES MELLITUS WITH STAGE 3B CHRONIC KIDNEY DISEASE, WITHOUT LONG-TERM CURRENT USE OF INSULIN (HCC): Primary | ICD-10-CM

## 2023-06-08 DIAGNOSIS — M15.9 PRIMARY OSTEOARTHRITIS INVOLVING MULTIPLE JOINTS: ICD-10-CM

## 2023-06-08 DIAGNOSIS — I10 ESSENTIAL HYPERTENSION, MALIGNANT: ICD-10-CM

## 2023-06-08 DIAGNOSIS — Q90.9: ICD-10-CM

## 2023-06-08 DIAGNOSIS — F79 MENTAL DEFICIENCY: ICD-10-CM

## 2023-06-08 DIAGNOSIS — N18.32 STAGE 3B CHRONIC KIDNEY DISEASE (HCC): ICD-10-CM

## 2023-06-08 DIAGNOSIS — N39.46 MIXED STRESS AND URGE URINARY INCONTINENCE: ICD-10-CM

## 2023-06-08 RX ORDER — VIBEGRON 75 MG/1
TABLET, FILM COATED ORAL
COMMUNITY
Start: 2023-06-02

## 2023-06-08 ASSESSMENT — ENCOUNTER SYMPTOMS
APNEA: 0
VOICE CHANGE: 0
RECTAL PAIN: 0
EYE PAIN: 0
PHOTOPHOBIA: 0
EYE ITCHING: 0
BLOOD IN STOOL: 0
VOMITING: 0
SHORTNESS OF BREATH: 0
CHEST TIGHTNESS: 0
COLOR CHANGE: 0
BACK PAIN: 0
EYE REDNESS: 0
ABDOMINAL DISTENTION: 0
WHEEZING: 0
ABDOMINAL PAIN: 0
RHINORRHEA: 0
TROUBLE SWALLOWING: 0
DIARRHEA: 0
CONSTIPATION: 0
SINUS PRESSURE: 0
FACIAL SWELLING: 0
NAUSEA: 0
STRIDOR: 0
SINUS PAIN: 0
ANAL BLEEDING: 0
CHOKING: 0
EYE DISCHARGE: 0
SORE THROAT: 0
COUGH: 0

## 2023-06-08 NOTE — PROGRESS NOTES
23  Efren Mota : 1945 Sex: male  Age: 69 y.o. Chief Complaint   Patient presents with    Discuss Medications     Which nasal sray should he use, flonase or azelastine ? Diabetes     1 month check up, starting to work on [address] [place] [address]  St is seen today to review blood sugars. He is checking at home and is running between 140-190. He has recently switched from regular to sugar free foods but is trying to learn about carbohydrates. The staff at the group home is trying to help him with this since he has a mental disability. He has been eating a lot of carbohydrates according to the nurse who is here with him today. He was recently started on Ozempic. He denies any complaints with the medication. Review of Systems   Constitutional:  Negative for activity change, appetite change, chills, diaphoresis, fatigue, fever and unexpected weight change. HENT:  Negative for congestion, dental problem, drooling, ear discharge, ear pain, facial swelling, hearing loss, mouth sores, nosebleeds, postnasal drip, rhinorrhea, sinus pressure, sinus pain, sneezing, sore throat, tinnitus, trouble swallowing and voice change. Eyes:  Negative for photophobia, pain, discharge, redness, itching and visual disturbance. Respiratory:  Negative for apnea, cough, choking, chest tightness, shortness of breath, wheezing and stridor. Cardiovascular:  Negative for chest pain, palpitations and leg swelling. Gastrointestinal:  Negative for abdominal distention, abdominal pain, anal bleeding, blood in stool, constipation, diarrhea, nausea, rectal pain and vomiting. Endocrine: Negative for cold intolerance, heat intolerance, polydipsia, polyphagia and polyuria. Genitourinary:  Negative for decreased urine volume, difficulty urinating, dysuria, enuresis, flank pain, frequency, genital sores, hematuria and urgency. Musculoskeletal:  Positive for gait problem (unsteady. Uses a walker).  Negative for

## 2023-07-06 ENCOUNTER — OFFICE VISIT (OUTPATIENT)
Dept: FAMILY MEDICINE CLINIC | Age: 78
End: 2023-07-06
Payer: MEDICARE

## 2023-07-06 VITALS
SYSTOLIC BLOOD PRESSURE: 122 MMHG | TEMPERATURE: 97.2 F | HEART RATE: 88 BPM | OXYGEN SATURATION: 99 % | DIASTOLIC BLOOD PRESSURE: 80 MMHG

## 2023-07-06 DIAGNOSIS — R26.81 UNSTEADY GAIT: ICD-10-CM

## 2023-07-06 DIAGNOSIS — F79 MENTAL DEFICIENCY: ICD-10-CM

## 2023-07-06 DIAGNOSIS — M15.9 PRIMARY OSTEOARTHRITIS INVOLVING MULTIPLE JOINTS: ICD-10-CM

## 2023-07-06 DIAGNOSIS — E66.01 CLASS 3 SEVERE OBESITY DUE TO EXCESS CALORIES WITH SERIOUS COMORBIDITY AND BODY MASS INDEX (BMI) OF 45.0 TO 49.9 IN ADULT (HCC): ICD-10-CM

## 2023-07-06 DIAGNOSIS — E78.5 HYPERLIPIDEMIA, UNSPECIFIED HYPERLIPIDEMIA TYPE: ICD-10-CM

## 2023-07-06 DIAGNOSIS — R01.1 CARDIAC MURMUR, UNSPECIFIED: ICD-10-CM

## 2023-07-06 DIAGNOSIS — E55.9 VITAMIN D DEFICIENCY: ICD-10-CM

## 2023-07-06 DIAGNOSIS — N18.32 STAGE 3B CHRONIC KIDNEY DISEASE (HCC): ICD-10-CM

## 2023-07-06 DIAGNOSIS — R15.9 FULL INCONTINENCE-FECES: ICD-10-CM

## 2023-07-06 DIAGNOSIS — E11.22 TYPE 2 DIABETES MELLITUS WITH STAGE 3B CHRONIC KIDNEY DISEASE, WITHOUT LONG-TERM CURRENT USE OF INSULIN (HCC): Primary | ICD-10-CM

## 2023-07-06 DIAGNOSIS — N18.32 TYPE 2 DIABETES MELLITUS WITH STAGE 3B CHRONIC KIDNEY DISEASE, WITHOUT LONG-TERM CURRENT USE OF INSULIN (HCC): Primary | ICD-10-CM

## 2023-07-06 DIAGNOSIS — N39.46 MIXED STRESS AND URGE URINARY INCONTINENCE: ICD-10-CM

## 2023-07-06 DIAGNOSIS — I10 ESSENTIAL HYPERTENSION, MALIGNANT: ICD-10-CM

## 2023-07-06 PROCEDURE — 99214 OFFICE O/P EST MOD 30 MIN: CPT | Performed by: NURSE PRACTITIONER

## 2023-07-06 PROCEDURE — G8427 DOCREV CUR MEDS BY ELIG CLIN: HCPCS | Performed by: NURSE PRACTITIONER

## 2023-07-06 PROCEDURE — 3044F HG A1C LEVEL LT 7.0%: CPT | Performed by: NURSE PRACTITIONER

## 2023-07-06 PROCEDURE — 3074F SYST BP LT 130 MM HG: CPT | Performed by: NURSE PRACTITIONER

## 2023-07-06 PROCEDURE — 1036F TOBACCO NON-USER: CPT | Performed by: NURSE PRACTITIONER

## 2023-07-06 PROCEDURE — 3079F DIAST BP 80-89 MM HG: CPT | Performed by: NURSE PRACTITIONER

## 2023-07-06 PROCEDURE — 1123F ACP DISCUSS/DSCN MKR DOCD: CPT | Performed by: NURSE PRACTITIONER

## 2023-07-06 PROCEDURE — G8417 CALC BMI ABV UP PARAM F/U: HCPCS | Performed by: NURSE PRACTITIONER

## 2023-07-06 RX ORDER — ERGOCALCIFEROL 1.25 MG/1
50000 CAPSULE ORAL
COMMUNITY
Start: 2023-04-26 | End: 2024-04-25

## 2023-07-06 ASSESSMENT — ENCOUNTER SYMPTOMS
PHOTOPHOBIA: 0
COLOR CHANGE: 0
EYE REDNESS: 0
ANAL BLEEDING: 0
VOICE CHANGE: 0
EYE DISCHARGE: 0
CHEST TIGHTNESS: 0
SINUS PAIN: 0
SHORTNESS OF BREATH: 0
NAUSEA: 0
EYE PAIN: 0
SORE THROAT: 0
RECTAL PAIN: 0
VOMITING: 0
BLOOD IN STOOL: 0
TROUBLE SWALLOWING: 0
COUGH: 0
ABDOMINAL DISTENTION: 0
SINUS PRESSURE: 0
FACIAL SWELLING: 0
DIARRHEA: 0
RHINORRHEA: 0
ABDOMINAL PAIN: 0
BACK PAIN: 0
STRIDOR: 0
EYE ITCHING: 0
WHEEZING: 0
CONSTIPATION: 0
CHOKING: 0
APNEA: 0

## 2023-07-06 NOTE — PROGRESS NOTES
Mouth/Throat:      Mouth: Mucous membranes are moist.      Pharynx: Oropharynx is clear. No oropharyngeal exudate or posterior oropharyngeal erythema. Eyes:      General: No scleral icterus. Right eye: No discharge. Left eye: No discharge. Extraocular Movements: Extraocular movements intact. Conjunctiva/sclera: Conjunctivae normal.      Pupils: Pupils are equal, round, and reactive to light. Neck:      Vascular: No carotid bruit. Cardiovascular:      Rate and Rhythm: Normal rate and regular rhythm. Pulses: Normal pulses. Heart sounds: Murmur heard. No friction rub. No gallop. Pulmonary:      Effort: Pulmonary effort is normal. No respiratory distress. Breath sounds: No stridor. No wheezing, rhonchi or rales. Chest:      Chest wall: No tenderness. Abdominal:      General: Bowel sounds are normal. There is no distension. Palpations: Abdomen is soft. There is no mass. Tenderness: There is no abdominal tenderness. There is no right CVA tenderness, left CVA tenderness, guarding or rebound. Hernia: No hernia is present. Musculoskeletal:         General: Deformity (Arthritic deformities of the knees) present. No swelling, tenderness or signs of injury. Normal range of motion. Cervical back: Normal range of motion and neck supple. No rigidity. No muscular tenderness. Right lower leg: No edema. Left lower leg: No edema. Lymphadenopathy:      Cervical: No cervical adenopathy. Skin:     General: Skin is warm and dry. Capillary Refill: Capillary refill takes less than 2 seconds. Coloration: Skin is not jaundiced or pale. Findings: No bruising, erythema, lesion or rash. Neurological:      General: No focal deficit present. Mental Status: He is alert and oriented to person, place, and time. Mental status is at baseline. Cranial Nerves: No cranial nerve deficit. Sensory: No sensory deficit.       Motor: No

## 2023-07-07 ENCOUNTER — TELEPHONE (OUTPATIENT)
Dept: FAMILY MEDICINE CLINIC | Age: 78
End: 2023-07-07

## 2023-07-07 NOTE — TELEPHONE ENCOUNTER
Requesting rx for \"rolling walker with diagnosis\"    I talked to Latha, she said this was done yesterday by Robyn.

## 2023-08-07 DIAGNOSIS — M15.9 PRIMARY OSTEOARTHRITIS INVOLVING MULTIPLE JOINTS: ICD-10-CM

## 2023-08-07 RX ORDER — TRAMADOL HYDROCHLORIDE 50 MG/1
50 TABLET ORAL EVERY 8 HOURS PRN
Qty: 90 TABLET | Refills: 0 | Status: SHIPPED | OUTPATIENT
Start: 2023-08-07 | End: 2023-09-06

## 2023-08-07 NOTE — TELEPHONE ENCOUNTER
Patients last appointment 7/6/2023. Patients next scheduled appointment   Future Appointments   Date Time Provider [address]   10/3/2023  8:30 AM SCHEDULE, YOMAIRA STONE E. [facility]   10/10/2023 10:00 AM DARREN Boles CNP [facility]   4/9/2024 10:00 AM DARREN Newby CNP [facility]

## 2023-08-22 ENCOUNTER — OFFICE VISIT (OUTPATIENT)
Age: 78
End: 2023-08-22
Payer: MEDICARE

## 2023-08-22 VITALS
RESPIRATION RATE: 17 BRPM | HEART RATE: 90 BPM | TEMPERATURE: 97.3 F | OXYGEN SATURATION: 98 % | DIASTOLIC BLOOD PRESSURE: 82 MMHG | SYSTOLIC BLOOD PRESSURE: 138 MMHG | WEIGHT: 277 LBS | BODY MASS INDEX: 46.1 KG/M2

## 2023-08-22 DIAGNOSIS — E11.22 TYPE 2 DIABETES MELLITUS WITH STAGE 3B CHRONIC KIDNEY DISEASE, WITHOUT LONG-TERM CURRENT USE OF INSULIN (HCC): ICD-10-CM

## 2023-08-22 DIAGNOSIS — F79 MENTAL DEFICIENCY: ICD-10-CM

## 2023-08-22 DIAGNOSIS — B36.9 FUNGAL DERMATITIS: Primary | ICD-10-CM

## 2023-08-22 DIAGNOSIS — N18.32 TYPE 2 DIABETES MELLITUS WITH STAGE 3B CHRONIC KIDNEY DISEASE, WITHOUT LONG-TERM CURRENT USE OF INSULIN (HCC): ICD-10-CM

## 2023-08-22 PROCEDURE — 1123F ACP DISCUSS/DSCN MKR DOCD: CPT | Performed by: NURSE PRACTITIONER

## 2023-08-22 PROCEDURE — 3044F HG A1C LEVEL LT 7.0%: CPT | Performed by: NURSE PRACTITIONER

## 2023-08-22 PROCEDURE — G8427 DOCREV CUR MEDS BY ELIG CLIN: HCPCS | Performed by: NURSE PRACTITIONER

## 2023-08-22 PROCEDURE — G8417 CALC BMI ABV UP PARAM F/U: HCPCS | Performed by: NURSE PRACTITIONER

## 2023-08-22 PROCEDURE — 99213 OFFICE O/P EST LOW 20 MIN: CPT | Performed by: NURSE PRACTITIONER

## 2023-08-22 PROCEDURE — 1036F TOBACCO NON-USER: CPT | Performed by: NURSE PRACTITIONER

## 2023-08-22 RX ORDER — NYSTATIN 100000 [USP'U]/G
POWDER TOPICAL
Qty: 1 EACH | Refills: 5 | Status: SHIPPED | OUTPATIENT
Start: 2023-08-22

## 2023-08-22 ASSESSMENT — ENCOUNTER SYMPTOMS
NAUSEA: 0
VOMITING: 0
EYE REDNESS: 0
BACK PAIN: 0
TROUBLE SWALLOWING: 0
SORE THROAT: 0
CHOKING: 0
APNEA: 0
RECTAL PAIN: 0
COUGH: 0
CHEST TIGHTNESS: 0
EYE ITCHING: 0
COLOR CHANGE: 0
EYE PAIN: 0
STRIDOR: 0
RHINORRHEA: 0
VOICE CHANGE: 0
PHOTOPHOBIA: 0
DIARRHEA: 0
EYE DISCHARGE: 0
ABDOMINAL PAIN: 0
SINUS PAIN: 0
WHEEZING: 0
FACIAL SWELLING: 0
BLOOD IN STOOL: 0
SHORTNESS OF BREATH: 0
ANAL BLEEDING: 0
ABDOMINAL DISTENTION: 0
CONSTIPATION: 0
SINUS PRESSURE: 0

## 2023-08-22 ASSESSMENT — PATIENT HEALTH QUESTIONNAIRE - PHQ9
2. FEELING DOWN, DEPRESSED OR HOPELESS: 0
SUM OF ALL RESPONSES TO PHQ QUESTIONS 1-9: 0
SUM OF ALL RESPONSES TO PHQ QUESTIONS 1-9: 0
SUM OF ALL RESPONSES TO PHQ9 QUESTIONS 1 & 2: 0
SUM OF ALL RESPONSES TO PHQ QUESTIONS 1-9: 0
1. LITTLE INTEREST OR PLEASURE IN DOING THINGS: 0
SUM OF ALL RESPONSES TO PHQ QUESTIONS 1-9: 0

## 2023-08-22 NOTE — PROGRESS NOTES
23  John Soler : 1945 Sex: male  Age: 69 y.o. Chief Complaint   Patient presents with    Skin Problem     Abdominal fold redness, yeast?  Patient states its painful        Fredick Pal is here today with complaints of a painful rash under the skin folds of his abdomen. It has become progressively worse over the last couple of days. Patient does wear an incontinence brief. He has been changed regularly. Review of Systems   Constitutional:  Negative for activity change, appetite change, chills, diaphoresis, fatigue, fever and unexpected weight change. HENT:  Negative for congestion, dental problem, drooling, ear discharge, ear pain, facial swelling, hearing loss, mouth sores, nosebleeds, postnasal drip, rhinorrhea, sinus pressure, sinus pain, sneezing, sore throat, tinnitus, trouble swallowing and voice change. Eyes:  Negative for photophobia, pain, discharge, redness, itching and visual disturbance. Respiratory:  Negative for apnea, cough, choking, chest tightness, shortness of breath, wheezing and stridor. Cardiovascular:  Negative for chest pain, palpitations and leg swelling. Gastrointestinal:  Negative for abdominal distention, abdominal pain, anal bleeding, blood in stool, constipation, diarrhea, nausea, rectal pain and vomiting. Endocrine: Negative for cold intolerance, heat intolerance, polydipsia, polyphagia and polyuria. Genitourinary:  Positive for frequency and urgency. Negative for decreased urine volume, difficulty urinating, dysuria, enuresis, flank pain, genital sores and hematuria. Musculoskeletal:  Negative for arthralgias, back pain, gait problem, joint swelling, myalgias, neck pain and neck stiffness. Skin:  Positive for rash. Negative for color change, pallor and wound. Allergic/Immunologic: Negative for environmental allergies, food allergies and immunocompromised state.    Neurological:  Negative for dizziness, tremors, seizures, syncope, facial

## 2023-08-28 ENCOUNTER — TELEPHONE (OUTPATIENT)
Dept: FAMILY MEDICINE CLINIC | Age: 78
End: 2023-08-28

## 2023-08-28 RX ORDER — NYSTATIN 100000 [USP'U]/G
POWDER TOPICAL
Qty: 5 EACH | Refills: 5 | Status: SHIPPED | OUTPATIENT
Start: 2023-08-28

## 2023-08-28 NOTE — TELEPHONE ENCOUNTER
Danielle Mclean stopped in and states that Donna Doyle is already out of his Nystatin and wanted to know if they could get a bigger amount of that called in because it did not last long at all ?

## 2023-08-29 ENCOUNTER — TELEPHONE (OUTPATIENT)
Dept: FAMILY MEDICINE CLINIC | Age: 78
End: 2023-08-29

## 2023-09-06 ENCOUNTER — OFFICE VISIT (OUTPATIENT)
Dept: FAMILY MEDICINE CLINIC | Age: 78
End: 2023-09-06
Payer: MEDICARE

## 2023-09-06 VITALS
RESPIRATION RATE: 17 BRPM | SYSTOLIC BLOOD PRESSURE: 134 MMHG | OXYGEN SATURATION: 96 % | HEART RATE: 65 BPM | WEIGHT: 276 LBS | BODY MASS INDEX: 45.93 KG/M2 | DIASTOLIC BLOOD PRESSURE: 74 MMHG | TEMPERATURE: 97.6 F

## 2023-09-06 DIAGNOSIS — R26.81 UNSTEADY GAIT: ICD-10-CM

## 2023-09-06 DIAGNOSIS — B36.9 FUNGAL DERMATITIS: Primary | ICD-10-CM

## 2023-09-06 DIAGNOSIS — M15.9 PRIMARY OSTEOARTHRITIS INVOLVING MULTIPLE JOINTS: ICD-10-CM

## 2023-09-06 DIAGNOSIS — E66.01 CLASS 3 SEVERE OBESITY DUE TO EXCESS CALORIES WITH SERIOUS COMORBIDITY AND BODY MASS INDEX (BMI) OF 45.0 TO 49.9 IN ADULT (HCC): ICD-10-CM

## 2023-09-06 DIAGNOSIS — N18.32 STAGE 3B CHRONIC KIDNEY DISEASE (HCC): ICD-10-CM

## 2023-09-06 DIAGNOSIS — F79 MENTAL DEFICIENCY: ICD-10-CM

## 2023-09-06 DIAGNOSIS — N18.32 TYPE 2 DIABETES MELLITUS WITH STAGE 3B CHRONIC KIDNEY DISEASE, WITHOUT LONG-TERM CURRENT USE OF INSULIN (HCC): ICD-10-CM

## 2023-09-06 DIAGNOSIS — E11.22 TYPE 2 DIABETES MELLITUS WITH STAGE 3B CHRONIC KIDNEY DISEASE, WITHOUT LONG-TERM CURRENT USE OF INSULIN (HCC): ICD-10-CM

## 2023-09-06 PROCEDURE — 1036F TOBACCO NON-USER: CPT | Performed by: NURSE PRACTITIONER

## 2023-09-06 PROCEDURE — 1123F ACP DISCUSS/DSCN MKR DOCD: CPT | Performed by: NURSE PRACTITIONER

## 2023-09-06 PROCEDURE — G8427 DOCREV CUR MEDS BY ELIG CLIN: HCPCS | Performed by: NURSE PRACTITIONER

## 2023-09-06 PROCEDURE — 99213 OFFICE O/P EST LOW 20 MIN: CPT | Performed by: NURSE PRACTITIONER

## 2023-09-06 PROCEDURE — G8417 CALC BMI ABV UP PARAM F/U: HCPCS | Performed by: NURSE PRACTITIONER

## 2023-09-06 PROCEDURE — 3044F HG A1C LEVEL LT 7.0%: CPT | Performed by: NURSE PRACTITIONER

## 2023-09-06 ASSESSMENT — ENCOUNTER SYMPTOMS
CHEST TIGHTNESS: 0
COUGH: 0
VOMITING: 0
EYE ITCHING: 0
VOICE CHANGE: 0
ABDOMINAL PAIN: 0
ABDOMINAL DISTENTION: 0
RHINORRHEA: 0
SINUS PRESSURE: 0
FACIAL SWELLING: 0
EYE REDNESS: 0
BACK PAIN: 0
STRIDOR: 0
DIARRHEA: 0
PHOTOPHOBIA: 0
SORE THROAT: 0
CONSTIPATION: 0
EYE PAIN: 0
EYE DISCHARGE: 0
TROUBLE SWALLOWING: 0
ANAL BLEEDING: 0
SINUS PAIN: 0
RECTAL PAIN: 0
CHOKING: 0
BLOOD IN STOOL: 0
COLOR CHANGE: 0
SHORTNESS OF BREATH: 0
NAUSEA: 0
WHEEZING: 0
APNEA: 0

## 2023-09-06 NOTE — PROGRESS NOTES
Neurological:      General: No focal deficit present. Mental Status: He is alert and oriented to person, place, and time. Mental status is at baseline. Cranial Nerves: No cranial nerve deficit. Sensory: No sensory deficit. Motor: Weakness present. Coordination: Coordination abnormal.      Gait: Gait abnormal.      Deep Tendon Reflexes: Reflexes abnormal.   Psychiatric:         Mood and Affect: Mood normal.         Behavior: Behavior normal.         Thought Content: Thought content normal.         Judgment: Judgment normal.      Comments: Patient has mental disability. He has at his baseline. He does need assistance with some decision making. Assessment and Plan:  Watson Pickens was seen today for follow-up. Diagnoses and all orders for this visit:    Fungal dermatitis    Type 2 diabetes mellitus with stage 3b chronic kidney disease, without long-term current use of insulin (HCC)    Mental deficiency    Unsteady gait    Primary osteoarthritis involving multiple joints    Stage 3b chronic kidney disease (HCC)    Class 3 severe obesity due to excess calories with serious comorbidity and body mass index (BMI) of 45.0 to 49.9 in adult [facility])        Discussions/Education provided to patients during visit:  [] Discussed the importance to stop smoking. [] Advised to monitor eating habits. [] Reviewed and discussed Imaging results. [] Reviewed and discussed Lab results. [] Discussed the importance of drinking plenty of fluids. [] Cut down on Salt and Caffeine.  [] Exercise 2-3 times weekly, if not more. [] Cut down on Sugar and Fats. [x] Continue Medications as Discussed. [x] Communicated with patient any concerns, to phone office. [x] Follow up as directed. Return in about 1 month (around 10/6/2023).       Seen By:      DARREN Moreno - CNP

## 2023-09-13 DIAGNOSIS — M15.9 PRIMARY OSTEOARTHRITIS INVOLVING MULTIPLE JOINTS: ICD-10-CM

## 2023-09-14 RX ORDER — TRAMADOL HYDROCHLORIDE 50 MG/1
50 TABLET ORAL EVERY 8 HOURS PRN
Qty: 90 TABLET | Refills: 0 | Status: SHIPPED | OUTPATIENT
Start: 2023-09-14 | End: 2023-10-14

## 2023-09-20 RX ORDER — MENTHOL AND ZINC OXIDE .44; 20.625 G/100G; G/100G
OINTMENT TOPICAL
Qty: 1 EACH | Refills: 5 | Status: SHIPPED | OUTPATIENT
Start: 2023-09-20

## 2023-09-30 DIAGNOSIS — M15.9 PRIMARY OSTEOARTHRITIS INVOLVING MULTIPLE JOINTS: ICD-10-CM

## 2023-10-02 RX ORDER — TRAMADOL HYDROCHLORIDE 50 MG/1
50 TABLET ORAL EVERY 8 HOURS PRN
Qty: 90 TABLET | Refills: 0 | Status: SHIPPED | OUTPATIENT
Start: 2023-10-02 | End: 2023-11-01

## 2023-10-02 NOTE — TELEPHONE ENCOUNTER
Patients last appointment Visit date not found. Patients next scheduled appointment   Future Appointments   Date Time Provider [address]   10/3/2023  8:30 AM SCHEDULE, YOMAIRA STONE E. [facility]   10/10/2023 10:00 AM DARREN Boles CNP ENilsa [facility]   4/9/2024 10:00 AM DARREN Montaño CNP [facility]

## 2023-10-03 ENCOUNTER — NURSE ONLY (OUTPATIENT)
Dept: FAMILY MEDICINE CLINIC | Age: 78
End: 2023-10-03
Payer: MEDICARE

## 2023-10-03 DIAGNOSIS — I12.9 HYPERTENSIVE RENAL DISEASE: ICD-10-CM

## 2023-10-03 DIAGNOSIS — N18.32 TYPE 2 DIABETES MELLITUS WITH STAGE 3B CHRONIC KIDNEY DISEASE, WITHOUT LONG-TERM CURRENT USE OF INSULIN (HCC): ICD-10-CM

## 2023-10-03 DIAGNOSIS — E55.9 VITAMIN D DEFICIENCY: ICD-10-CM

## 2023-10-03 DIAGNOSIS — E66.01 CLASS 3 SEVERE OBESITY DUE TO EXCESS CALORIES WITH SERIOUS COMORBIDITY AND BODY MASS INDEX (BMI) OF 45.0 TO 49.9 IN ADULT (HCC): ICD-10-CM

## 2023-10-03 DIAGNOSIS — E11.22 TYPE 2 DIABETES MELLITUS WITH STAGE 3B CHRONIC KIDNEY DISEASE, WITHOUT LONG-TERM CURRENT USE OF INSULIN (HCC): Primary | ICD-10-CM

## 2023-10-03 DIAGNOSIS — N18.32 TYPE 2 DIABETES MELLITUS WITH STAGE 3B CHRONIC KIDNEY DISEASE, WITHOUT LONG-TERM CURRENT USE OF INSULIN (HCC): Primary | ICD-10-CM

## 2023-10-03 DIAGNOSIS — E11.22 TYPE 2 DIABETES MELLITUS WITH STAGE 3B CHRONIC KIDNEY DISEASE, WITHOUT LONG-TERM CURRENT USE OF INSULIN (HCC): ICD-10-CM

## 2023-10-03 DIAGNOSIS — E03.8 OTHER SPECIFIED HYPOTHYROIDISM: ICD-10-CM

## 2023-10-03 LAB
ABSOLUTE IMMATURE GRANULOCYTE: 0.04 K/UL (ref 0–0.58)
BASOPHILS ABSOLUTE: 0.03 K/UL (ref 0–0.2)
BASOPHILS RELATIVE PERCENT: 0 % (ref 0–2)
EOSINOPHILS ABSOLUTE: 0.14 K/UL (ref 0.05–0.5)
EOSINOPHILS RELATIVE PERCENT: 2 % (ref 0–6)
HBA1C MFR BLD: 6.2 % (ref 4–5.6)
HCT VFR BLD CALC: 46.3 % (ref 37–54)
HEMOGLOBIN: 14.6 G/DL (ref 12.5–16.5)
IMMATURE GRANULOCYTES: 0 % (ref 0–5)
LYMPHOCYTES ABSOLUTE: 1.56 K/UL (ref 1.5–4)
LYMPHOCYTES RELATIVE PERCENT: 17 % (ref 20–42)
MCH RBC QN AUTO: 32.9 PG (ref 26–35)
MCHC RBC AUTO-ENTMCNC: 31.5 G/DL (ref 32–34.5)
MCV RBC AUTO: 104.3 FL (ref 80–99.9)
MONOCYTES ABSOLUTE: 0.96 K/UL (ref 0.1–0.95)
MONOCYTES RELATIVE PERCENT: 11 % (ref 2–12)
NEUTROPHILS ABSOLUTE: 6.42 K/UL (ref 1.8–7.3)
NEUTROPHILS RELATIVE PERCENT: 70 % (ref 43–80)
PDW BLD-RTO: 14.6 % (ref 11.5–15)
PLATELET # BLD: 276 K/UL (ref 130–450)
PMV BLD AUTO: 9.6 FL (ref 7–12)
RBC # BLD: 4.44 M/UL (ref 3.8–5.8)
WBC # BLD: 9.2 K/UL (ref 4.5–11.5)

## 2023-10-03 PROCEDURE — 36415 COLL VENOUS BLD VENIPUNCTURE: CPT | Performed by: NURSE PRACTITIONER

## 2023-10-04 LAB
ALBUMIN SERPL-MCNC: 3.7 G/DL (ref 3.5–5.2)
ALP BLD-CCNC: 129 U/L (ref 40–129)
ALT SERPL-CCNC: 20 U/L (ref 0–40)
ANION GAP SERPL CALCULATED.3IONS-SCNC: 17 MMOL/L (ref 7–16)
AST SERPL-CCNC: 22 U/L (ref 0–39)
BILIRUB SERPL-MCNC: 0.6 MG/DL (ref 0–1.2)
BUN BLDV-MCNC: 19 MG/DL (ref 6–23)
CALCIUM SERPL-MCNC: 9.1 MG/DL (ref 8.6–10.2)
CHLORIDE BLD-SCNC: 104 MMOL/L (ref 98–107)
CHOLESTEROL: 131 MG/DL
CO2: 17 MMOL/L (ref 22–29)
CREAT SERPL-MCNC: 1.2 MG/DL (ref 0.7–1.2)
GFR SERPL CREATININE-BSD FRML MDRD: >60 ML/MIN/1.73M2
GLUCOSE BLD-MCNC: 107 MG/DL (ref 74–99)
HDLC SERPL-MCNC: 51 MG/DL
LDL CHOLESTEROL: 69 MG/DL
POTASSIUM SERPL-SCNC: 4.9 MMOL/L (ref 3.5–5)
SODIUM BLD-SCNC: 138 MMOL/L (ref 132–146)
T4 FREE: 1.2 NG/DL (ref 0.9–1.7)
TOTAL PROTEIN: 7 G/DL (ref 6.4–8.3)
TRIGL SERPL-MCNC: 55 MG/DL
TSH SERPL DL<=0.05 MIU/L-ACNC: 3.4 UIU/ML (ref 0.27–4.2)
VITAMIN D 25-HYDROXY: 47.2 NG/ML (ref 30–100)
VLDLC SERPL CALC-MCNC: 11 MG/DL

## 2023-10-10 ENCOUNTER — OFFICE VISIT (OUTPATIENT)
Dept: FAMILY MEDICINE CLINIC | Age: 78
End: 2023-10-10
Payer: MEDICARE

## 2023-10-10 VITALS
SYSTOLIC BLOOD PRESSURE: 138 MMHG | TEMPERATURE: 97.4 F | RESPIRATION RATE: 17 BRPM | WEIGHT: 274 LBS | BODY MASS INDEX: 45.65 KG/M2 | OXYGEN SATURATION: 99 % | HEART RATE: 82 BPM | DIASTOLIC BLOOD PRESSURE: 82 MMHG | HEIGHT: 65 IN

## 2023-10-10 DIAGNOSIS — E55.9 VITAMIN D DEFICIENCY: ICD-10-CM

## 2023-10-10 DIAGNOSIS — E03.8 OTHER SPECIFIED HYPOTHYROIDISM: ICD-10-CM

## 2023-10-10 DIAGNOSIS — N18.32 STAGE 3B CHRONIC KIDNEY DISEASE (HCC): ICD-10-CM

## 2023-10-10 DIAGNOSIS — E78.5 HYPERLIPIDEMIA, UNSPECIFIED HYPERLIPIDEMIA TYPE: ICD-10-CM

## 2023-10-10 DIAGNOSIS — I10 ESSENTIAL HYPERTENSION, MALIGNANT: ICD-10-CM

## 2023-10-10 DIAGNOSIS — R15.9 FULL INCONTINENCE-FECES: ICD-10-CM

## 2023-10-10 DIAGNOSIS — R01.1 CARDIAC MURMUR, UNSPECIFIED: ICD-10-CM

## 2023-10-10 DIAGNOSIS — N39.46 MIXED STRESS AND URGE URINARY INCONTINENCE: ICD-10-CM

## 2023-10-10 DIAGNOSIS — E11.22 TYPE 2 DIABETES MELLITUS WITH STAGE 3B CHRONIC KIDNEY DISEASE, WITHOUT LONG-TERM CURRENT USE OF INSULIN (HCC): Primary | ICD-10-CM

## 2023-10-10 DIAGNOSIS — F79 MENTAL DEFICIENCY: ICD-10-CM

## 2023-10-10 DIAGNOSIS — R26.81 UNSTEADY GAIT: ICD-10-CM

## 2023-10-10 DIAGNOSIS — M15.9 PRIMARY OSTEOARTHRITIS INVOLVING MULTIPLE JOINTS: ICD-10-CM

## 2023-10-10 DIAGNOSIS — N18.32 TYPE 2 DIABETES MELLITUS WITH STAGE 3B CHRONIC KIDNEY DISEASE, WITHOUT LONG-TERM CURRENT USE OF INSULIN (HCC): Primary | ICD-10-CM

## 2023-10-10 DIAGNOSIS — E66.01 CLASS 3 SEVERE OBESITY DUE TO EXCESS CALORIES WITH SERIOUS COMORBIDITY AND BODY MASS INDEX (BMI) OF 45.0 TO 49.9 IN ADULT (HCC): ICD-10-CM

## 2023-10-10 PROCEDURE — 1036F TOBACCO NON-USER: CPT | Performed by: NURSE PRACTITIONER

## 2023-10-10 PROCEDURE — G8427 DOCREV CUR MEDS BY ELIG CLIN: HCPCS | Performed by: NURSE PRACTITIONER

## 2023-10-10 PROCEDURE — 99214 OFFICE O/P EST MOD 30 MIN: CPT | Performed by: NURSE PRACTITIONER

## 2023-10-10 PROCEDURE — 3075F SYST BP GE 130 - 139MM HG: CPT | Performed by: NURSE PRACTITIONER

## 2023-10-10 PROCEDURE — 3044F HG A1C LEVEL LT 7.0%: CPT | Performed by: NURSE PRACTITIONER

## 2023-10-10 PROCEDURE — G8417 CALC BMI ABV UP PARAM F/U: HCPCS | Performed by: NURSE PRACTITIONER

## 2023-10-10 PROCEDURE — G8484 FLU IMMUNIZE NO ADMIN: HCPCS | Performed by: NURSE PRACTITIONER

## 2023-10-10 PROCEDURE — 3079F DIAST BP 80-89 MM HG: CPT | Performed by: NURSE PRACTITIONER

## 2023-10-10 PROCEDURE — 1123F ACP DISCUSS/DSCN MKR DOCD: CPT | Performed by: NURSE PRACTITIONER

## 2023-10-10 ASSESSMENT — ENCOUNTER SYMPTOMS
APNEA: 0
RECTAL PAIN: 0
COLOR CHANGE: 0
RHINORRHEA: 0
VOMITING: 0
SINUS PAIN: 0
ABDOMINAL DISTENTION: 0
BLOOD IN STOOL: 0
EYE ITCHING: 0
CHOKING: 0
NAUSEA: 0
TROUBLE SWALLOWING: 0
CONSTIPATION: 0
VOICE CHANGE: 0
WHEEZING: 0
PHOTOPHOBIA: 0
EYE DISCHARGE: 0
BACK PAIN: 0
FACIAL SWELLING: 0
SHORTNESS OF BREATH: 0
EYE REDNESS: 0
SORE THROAT: 0
COUGH: 0
CHEST TIGHTNESS: 0
STRIDOR: 0
DIARRHEA: 0
ANAL BLEEDING: 0
EYE PAIN: 0
SINUS PRESSURE: 0
ABDOMINAL PAIN: 0

## 2023-10-10 NOTE — PROGRESS NOTES
10/10/23  Trista Post : 1945 Sex: male  Age: 69 y.o. Chief Complaint   Patient presents with    Avni Montes is seen today to review his labs. He is accompanied by his threshold nurse who states that he has been doing well. Review of his blood sugars show 120-235. Patient has been having difficulty getting his Ozempic as it has been on a national back order. Review of Systems   Constitutional:  Negative for activity change, appetite change, chills, diaphoresis, fatigue, fever and unexpected weight change. HENT:  Negative for congestion, dental problem, drooling, ear discharge, ear pain, facial swelling, hearing loss, mouth sores, nosebleeds, postnasal drip, rhinorrhea, sinus pressure, sinus pain, sneezing, sore throat, tinnitus, trouble swallowing and voice change. Eyes:  Negative for photophobia, pain, discharge, redness, itching and visual disturbance. Respiratory:  Negative for apnea, cough, choking, chest tightness, shortness of breath, wheezing and stridor. Cardiovascular:  Negative for chest pain, palpitations and leg swelling. Gastrointestinal:  Negative for abdominal distention, abdominal pain, anal bleeding, blood in stool, constipation, diarrhea, nausea, rectal pain and vomiting. Endocrine: Negative for cold intolerance, heat intolerance, polydipsia, polyphagia and polyuria. Genitourinary:  Negative for decreased urine volume, difficulty urinating, dysuria, enuresis, flank pain, frequency, genital sores, hematuria and urgency. Musculoskeletal:  Negative for arthralgias, back pain, gait problem, joint swelling, myalgias, neck pain and neck stiffness. Skin:  Negative for color change, pallor, rash and wound. Allergic/Immunologic: Negative for environmental allergies, food allergies and immunocompromised state.    Neurological:  Negative for dizziness, tremors, seizures, syncope, facial asymmetry, speech difficulty, weakness, light-headedness,

## 2023-10-16 LAB
25(OH)D3 SERPL-MCNC: 44.5 NG/ML (ref 30–100)
ALBUMIN SERPL-MCNC: 3.6 G/DL (ref 3.5–5.2)
ALP SERPL-CCNC: 124 U/L (ref 40–129)
ALT SERPL-CCNC: 22 U/L (ref 0–40)
ANION GAP SERPL CALCULATED.3IONS-SCNC: 14 MMOL/L (ref 7–16)
AST SERPL-CCNC: 24 U/L (ref 0–39)
BILIRUB SERPL-MCNC: 0.3 MG/DL (ref 0–1.2)
BUN SERPL-MCNC: 23 MG/DL (ref 6–23)
CALCIUM SERPL-MCNC: 9.2 MG/DL (ref 8.6–10.2)
CHLORIDE SERPL-SCNC: 104 MMOL/L (ref 98–107)
CO2 SERPL-SCNC: 20 MMOL/L (ref 22–29)
CREAT SERPL-MCNC: 1.1 MG/DL (ref 0.7–1.2)
CREAT UR-MCNC: 43.4 MG/DL (ref 40–278)
CREAT UR-MCNC: 44.2 MG/DL (ref 40–278)
GFR SERPL CREATININE-BSD FRML MDRD: >60 ML/MIN/1.73M2
GLUCOSE SERPL-MCNC: 178 MG/DL (ref 74–99)
MAGNESIUM SERPL-MCNC: 1.8 MG/DL (ref 1.6–2.6)
MICROALBUMIN UR-MCNC: 245 MG/L (ref 0–19)
MICROALBUMIN/CREAT UR-RTO: 555 MCG/MG CREAT (ref 0–30)
PHOSPHATE SERPL-MCNC: 3 MG/DL (ref 2.5–4.5)
POTASSIUM SERPL-SCNC: 4.6 MMOL/L (ref 3.5–5)
PROT SERPL-MCNC: 6.7 G/DL (ref 6.4–8.3)
SODIUM SERPL-SCNC: 138 MMOL/L (ref 132–146)
TOTAL PROTEIN, URINE: 46 MG/DL (ref 0–12)
URATE SERPL-MCNC: 5.4 MG/DL (ref 3.4–7)
URINE TOTAL PROTEIN CREATININE RATIO: 1.05 (ref 0–0.2)

## 2023-12-07 DIAGNOSIS — M15.9 PRIMARY OSTEOARTHRITIS INVOLVING MULTIPLE JOINTS: Primary | ICD-10-CM

## 2023-12-07 RX ORDER — TRAMADOL HYDROCHLORIDE 50 MG/1
50 TABLET ORAL 3 TIMES DAILY
Qty: 93 TABLET | Refills: 0 | Status: SHIPPED | OUTPATIENT
Start: 2023-12-07 | End: 2024-01-07

## 2023-12-07 RX ORDER — TRAMADOL HYDROCHLORIDE 50 MG/1
TABLET ORAL
COMMUNITY
Start: 2023-11-22 | End: 2023-12-07 | Stop reason: SDUPTHER

## 2023-12-26 NOTE — Clinical Note
[facility] [address]  Phone: [phone]  Fax: [phone]    DARREN Gonzalez CNP        October 4, 2022     Patient: Ayden Giron   YOB: 1945   Date of Visit: 10/4/2022       To Whom It May Concern: It is my medical opinion that Akin Britton {Work release (duty restriction):89476}. If you have any questions or concerns, please don't hesitate to call.     Sincerely,        DARREN Gonzalez CNP no concerns

## 2023-12-27 DIAGNOSIS — M15.9 PRIMARY OSTEOARTHRITIS INVOLVING MULTIPLE JOINTS: ICD-10-CM

## 2023-12-27 RX ORDER — TRAMADOL HYDROCHLORIDE 50 MG/1
50 TABLET ORAL 3 TIMES DAILY
Qty: 93 TABLET | Refills: 0 | Status: SHIPPED | OUTPATIENT
Start: 2023-12-27 | End: 2024-01-27

## 2023-12-27 NOTE — TELEPHONE ENCOUNTER
Patients last appointment 10/10/2023.   Patients next scheduled appointment   Future Appointments   Date Time Provider [address]   1/10/2024  9:15 AM Rufina, Latha L, APRN - CNP E. PAL [facility]   4/9/2024 10:00 AM Latha Almaraz APRN - CNP E. PAL [facility]

## 2024-01-10 ENCOUNTER — OFFICE VISIT (OUTPATIENT)
Dept: FAMILY MEDICINE CLINIC | Age: 79
End: 2024-01-10
Payer: MEDICARE

## 2024-01-10 VITALS
BODY MASS INDEX: 45.48 KG/M2 | OXYGEN SATURATION: 96 % | WEIGHT: 273 LBS | HEIGHT: 65 IN | DIASTOLIC BLOOD PRESSURE: 70 MMHG | HEART RATE: 75 BPM | RESPIRATION RATE: 18 BRPM | SYSTOLIC BLOOD PRESSURE: 130 MMHG | TEMPERATURE: 97.9 F

## 2024-01-10 DIAGNOSIS — R26.81 UNSTEADY GAIT: ICD-10-CM

## 2024-01-10 DIAGNOSIS — E03.8 OTHER SPECIFIED HYPOTHYROIDISM: ICD-10-CM

## 2024-01-10 DIAGNOSIS — M15.9 PRIMARY OSTEOARTHRITIS INVOLVING MULTIPLE JOINTS: ICD-10-CM

## 2024-01-10 DIAGNOSIS — N18.32 STAGE 3B CHRONIC KIDNEY DISEASE (HCC): ICD-10-CM

## 2024-01-10 DIAGNOSIS — E11.22 TYPE 2 DIABETES MELLITUS WITH STAGE 3B CHRONIC KIDNEY DISEASE, WITHOUT LONG-TERM CURRENT USE OF INSULIN (HCC): Primary | ICD-10-CM

## 2024-01-10 DIAGNOSIS — E55.9 VITAMIN D DEFICIENCY: ICD-10-CM

## 2024-01-10 DIAGNOSIS — I10 ESSENTIAL HYPERTENSION, MALIGNANT: ICD-10-CM

## 2024-01-10 DIAGNOSIS — R15.9 FULL INCONTINENCE-FECES: ICD-10-CM

## 2024-01-10 DIAGNOSIS — E78.5 HYPERLIPIDEMIA, UNSPECIFIED HYPERLIPIDEMIA TYPE: ICD-10-CM

## 2024-01-10 DIAGNOSIS — N39.46 MIXED STRESS AND URGE URINARY INCONTINENCE: ICD-10-CM

## 2024-01-10 DIAGNOSIS — N18.32 TYPE 2 DIABETES MELLITUS WITH STAGE 3B CHRONIC KIDNEY DISEASE, WITHOUT LONG-TERM CURRENT USE OF INSULIN (HCC): Primary | ICD-10-CM

## 2024-01-10 DIAGNOSIS — E66.01 CLASS 3 SEVERE OBESITY DUE TO EXCESS CALORIES WITH SERIOUS COMORBIDITY AND BODY MASS INDEX (BMI) OF 45.0 TO 49.9 IN ADULT (HCC): ICD-10-CM

## 2024-01-10 DIAGNOSIS — F79 MENTAL DEFICIENCY: ICD-10-CM

## 2024-01-10 LAB — HBA1C MFR BLD: 6.4 %

## 2024-01-10 PROCEDURE — 83036 HEMOGLOBIN GLYCOSYLATED A1C: CPT | Performed by: NURSE PRACTITIONER

## 2024-01-10 PROCEDURE — 1036F TOBACCO NON-USER: CPT | Performed by: NURSE PRACTITIONER

## 2024-01-10 PROCEDURE — 3044F HG A1C LEVEL LT 7.0%: CPT | Performed by: NURSE PRACTITIONER

## 2024-01-10 PROCEDURE — G8484 FLU IMMUNIZE NO ADMIN: HCPCS | Performed by: NURSE PRACTITIONER

## 2024-01-10 PROCEDURE — 3078F DIAST BP <80 MM HG: CPT | Performed by: NURSE PRACTITIONER

## 2024-01-10 PROCEDURE — 99214 OFFICE O/P EST MOD 30 MIN: CPT | Performed by: NURSE PRACTITIONER

## 2024-01-10 PROCEDURE — 1123F ACP DISCUSS/DSCN MKR DOCD: CPT | Performed by: NURSE PRACTITIONER

## 2024-01-10 PROCEDURE — G8427 DOCREV CUR MEDS BY ELIG CLIN: HCPCS | Performed by: NURSE PRACTITIONER

## 2024-01-10 PROCEDURE — 3075F SYST BP GE 130 - 139MM HG: CPT | Performed by: NURSE PRACTITIONER

## 2024-01-10 PROCEDURE — G8417 CALC BMI ABV UP PARAM F/U: HCPCS | Performed by: NURSE PRACTITIONER

## 2024-01-10 ASSESSMENT — PATIENT HEALTH QUESTIONNAIRE - PHQ9
SUM OF ALL RESPONSES TO PHQ QUESTIONS 1-9: 0
SUM OF ALL RESPONSES TO PHQ9 QUESTIONS 1 & 2: 0
1. LITTLE INTEREST OR PLEASURE IN DOING THINGS: 0
2. FEELING DOWN, DEPRESSED OR HOPELESS: 0
1. LITTLE INTEREST OR PLEASURE IN DOING THINGS: NOT AT ALL
SUM OF ALL RESPONSES TO PHQ QUESTIONS 1-9: 0
SUM OF ALL RESPONSES TO PHQ9 QUESTIONS 1 & 2: 0
2. FEELING DOWN, DEPRESSED OR HOPELESS: NOT AT ALL

## 2024-01-10 NOTE — PROGRESS NOTES
1/10/24  Irvin Hunt : 1945 Sex: male  Age: 78 y.o.    Chief Complaint   Patient presents with    Medication Check     3 month med check        Irvin is seen today for 3-month checkup and review of system patient states that he is feeling well.  Hemoglobin A1c today in the office is 6.4.  He states he has been following his diet fairly well.  He denies any pain or discomfort.  He is accompanied by his nurse from [facility].  She reports that there have not been any problems that they have noticed.        Review of Systems   Constitutional:  Negative for activity change, appetite change, chills, diaphoresis, fatigue, fever and unexpected weight change.   HENT:  Negative for congestion, dental problem, drooling, ear discharge, ear pain, facial swelling, hearing loss, mouth sores, nosebleeds, postnasal drip, rhinorrhea, sinus pressure, sinus pain, sneezing, sore throat, tinnitus, trouble swallowing and voice change.    Eyes:  Negative for photophobia, pain, discharge, redness, itching and visual disturbance.   Respiratory:  Negative for apnea, cough, choking, chest tightness, shortness of breath, wheezing and stridor.    Cardiovascular:  Negative for chest pain, palpitations and leg swelling.   Gastrointestinal:  Negative for abdominal distention, abdominal pain, anal bleeding, blood in stool, constipation, diarrhea, nausea, rectal pain and vomiting.   Endocrine: Negative for cold intolerance, heat intolerance, polydipsia, polyphagia and polyuria.   Genitourinary:  Negative for decreased urine volume, difficulty urinating, dysuria, enuresis, flank pain, frequency, genital sores, hematuria and urgency.   Musculoskeletal:  Negative for arthralgias, back pain, gait problem, joint swelling, myalgias, neck pain and neck stiffness.   Skin:  Negative for color change, pallor, rash and wound.   Allergic/Immunologic: Negative for environmental allergies, food allergies and immunocompromised state.   Neurological:

## 2024-01-11 ASSESSMENT — ENCOUNTER SYMPTOMS
CONSTIPATION: 0
EYE ITCHING: 0
TROUBLE SWALLOWING: 0
ABDOMINAL DISTENTION: 0
EYE DISCHARGE: 0
COLOR CHANGE: 0
EYE REDNESS: 0
ANAL BLEEDING: 0
VOMITING: 0
BACK PAIN: 0
COUGH: 0
VOICE CHANGE: 0
STRIDOR: 0
SORE THROAT: 0
WHEEZING: 0
BLOOD IN STOOL: 0
PHOTOPHOBIA: 0
RECTAL PAIN: 0
ABDOMINAL PAIN: 0
SINUS PRESSURE: 0
EYE PAIN: 0
CHOKING: 0
NAUSEA: 0
APNEA: 0
CHEST TIGHTNESS: 0
SINUS PAIN: 0
SHORTNESS OF BREATH: 0
FACIAL SWELLING: 0
DIARRHEA: 0
RHINORRHEA: 0

## 2024-02-02 DIAGNOSIS — M15.9 PRIMARY OSTEOARTHRITIS INVOLVING MULTIPLE JOINTS: ICD-10-CM

## 2024-02-02 DIAGNOSIS — M15.9 PRIMARY OSTEOARTHRITIS INVOLVING MULTIPLE JOINTS: Primary | ICD-10-CM

## 2024-02-02 RX ORDER — TRAMADOL HYDROCHLORIDE 50 MG/1
50 TABLET ORAL EVERY 6 HOURS PRN
Qty: 120 TABLET | Refills: 0 | Status: SHIPPED | OUTPATIENT
Start: 2024-02-02 | End: 2024-03-03

## 2024-02-02 RX ORDER — TRAMADOL HYDROCHLORIDE 50 MG/1
TABLET ORAL
Qty: 93 TABLET | Refills: 4 | OUTPATIENT
Start: 2024-02-02

## 2024-02-02 NOTE — TELEPHONE ENCOUNTER
Patients last appointment Visit date not found.  Patients next scheduled appointment   Future Appointments   Date Time Provider Department Center   4/2/2024  9:00 AM SCHEDULE, YOMAIRA GUNN [facility]   4/9/2024 10:00 AM Latha Almaraz, DARREN - CNP E. [facility]

## 2024-02-05 ENCOUNTER — OFFICE VISIT (OUTPATIENT)
Age: 79
End: 2024-02-05
Payer: MEDICARE

## 2024-02-05 VITALS
OXYGEN SATURATION: 96 % | TEMPERATURE: 97.7 F | BODY MASS INDEX: 45.48 KG/M2 | HEART RATE: 93 BPM | HEIGHT: 65 IN | RESPIRATION RATE: 16 BRPM | DIASTOLIC BLOOD PRESSURE: 74 MMHG | WEIGHT: 273 LBS | SYSTOLIC BLOOD PRESSURE: 128 MMHG

## 2024-02-05 DIAGNOSIS — N18.32 TYPE 2 DIABETES MELLITUS WITH STAGE 3B CHRONIC KIDNEY DISEASE, WITHOUT LONG-TERM CURRENT USE OF INSULIN (HCC): ICD-10-CM

## 2024-02-05 DIAGNOSIS — J01.40 ACUTE NON-RECURRENT PANSINUSITIS: Primary | ICD-10-CM

## 2024-02-05 DIAGNOSIS — R01.1 CARDIAC MURMUR, UNSPECIFIED: ICD-10-CM

## 2024-02-05 DIAGNOSIS — R09.81 SINUS CONGESTION: ICD-10-CM

## 2024-02-05 DIAGNOSIS — I10 ESSENTIAL HYPERTENSION, MALIGNANT: ICD-10-CM

## 2024-02-05 DIAGNOSIS — E03.8 OTHER SPECIFIED HYPOTHYROIDISM: ICD-10-CM

## 2024-02-05 DIAGNOSIS — E11.22 TYPE 2 DIABETES MELLITUS WITH STAGE 3B CHRONIC KIDNEY DISEASE, WITHOUT LONG-TERM CURRENT USE OF INSULIN (HCC): ICD-10-CM

## 2024-02-05 DIAGNOSIS — F79 MENTAL DEFICIENCY: ICD-10-CM

## 2024-02-05 DIAGNOSIS — E78.5 HYPERLIPIDEMIA, UNSPECIFIED HYPERLIPIDEMIA TYPE: ICD-10-CM

## 2024-02-05 DIAGNOSIS — M15.9 PRIMARY OSTEOARTHRITIS INVOLVING MULTIPLE JOINTS: ICD-10-CM

## 2024-02-05 DIAGNOSIS — E55.9 VITAMIN D DEFICIENCY: ICD-10-CM

## 2024-02-05 DIAGNOSIS — N18.32 STAGE 3B CHRONIC KIDNEY DISEASE (HCC): ICD-10-CM

## 2024-02-05 DIAGNOSIS — E66.01 CLASS 3 SEVERE OBESITY DUE TO EXCESS CALORIES WITH SERIOUS COMORBIDITY AND BODY MASS INDEX (BMI) OF 45.0 TO 49.9 IN ADULT (HCC): ICD-10-CM

## 2024-02-05 DIAGNOSIS — R15.9 FULL INCONTINENCE-FECES: ICD-10-CM

## 2024-02-05 LAB
INFLUENZA A ANTIBODY: NORMAL
INFLUENZA B ANTIBODY: NEGATIVE
Lab: NORMAL
PERFORMING INSTRUMENT: NORMAL
QC PASS/FAIL: NORMAL
SARS-COV-2, POC: NORMAL

## 2024-02-05 PROCEDURE — 3074F SYST BP LT 130 MM HG: CPT | Performed by: NURSE PRACTITIONER

## 2024-02-05 PROCEDURE — 87804 INFLUENZA ASSAY W/OPTIC: CPT | Performed by: NURSE PRACTITIONER

## 2024-02-05 PROCEDURE — 3044F HG A1C LEVEL LT 7.0%: CPT | Performed by: NURSE PRACTITIONER

## 2024-02-05 PROCEDURE — 99214 OFFICE O/P EST MOD 30 MIN: CPT | Performed by: NURSE PRACTITIONER

## 2024-02-05 PROCEDURE — G8427 DOCREV CUR MEDS BY ELIG CLIN: HCPCS | Performed by: NURSE PRACTITIONER

## 2024-02-05 PROCEDURE — G8417 CALC BMI ABV UP PARAM F/U: HCPCS | Performed by: NURSE PRACTITIONER

## 2024-02-05 PROCEDURE — 3078F DIAST BP <80 MM HG: CPT | Performed by: NURSE PRACTITIONER

## 2024-02-05 PROCEDURE — 87426 SARSCOV CORONAVIRUS AG IA: CPT | Performed by: NURSE PRACTITIONER

## 2024-02-05 PROCEDURE — 1123F ACP DISCUSS/DSCN MKR DOCD: CPT | Performed by: NURSE PRACTITIONER

## 2024-02-05 PROCEDURE — G8484 FLU IMMUNIZE NO ADMIN: HCPCS | Performed by: NURSE PRACTITIONER

## 2024-02-05 PROCEDURE — 1036F TOBACCO NON-USER: CPT | Performed by: NURSE PRACTITIONER

## 2024-02-05 RX ORDER — DIPHENHYDRAMINE HYDROCHLORIDE 25 MG/1
CAPSULE ORAL
COMMUNITY
Start: 2024-01-11

## 2024-02-05 RX ORDER — LOPERAMIDE HYDROCHLORIDE 2 MG/1
CAPSULE ORAL
COMMUNITY
Start: 2024-01-11

## 2024-02-05 RX ORDER — AMOXICILLIN AND CLAVULANATE POTASSIUM 875; 125 MG/1; MG/1
1 TABLET, FILM COATED ORAL 2 TIMES DAILY
Qty: 20 TABLET | Refills: 0 | Status: SHIPPED | OUTPATIENT
Start: 2024-02-05 | End: 2024-02-15

## 2024-02-05 RX ORDER — TRAMADOL HYDROCHLORIDE 50 MG/1
50 TABLET ORAL EVERY 6 HOURS PRN
Qty: 120 TABLET | Refills: 0 | Status: SHIPPED | OUTPATIENT
Start: 2024-02-05 | End: 2024-03-06

## 2024-02-05 RX ORDER — IBUPROFEN 200 MG
TABLET ORAL
COMMUNITY
Start: 2024-01-11

## 2024-02-05 RX ORDER — SIMETHICONE 80 MG
TABLET,CHEWABLE ORAL
COMMUNITY
Start: 2024-01-11

## 2024-02-05 RX ORDER — ASCORBIC ACID 500 MG
TABLET ORAL
COMMUNITY
Start: 2024-01-11

## 2024-02-05 RX ORDER — GUAIFENESIN 100 MG/5ML
LIQUID ORAL
COMMUNITY
Start: 2024-01-11

## 2024-02-05 RX ORDER — VITAMIN E 268 MG
CAPSULE ORAL
COMMUNITY
Start: 2024-01-11

## 2024-02-05 RX ORDER — ASPIRIN 325 MG
TABLET, DELAYED RELEASE (ENTERIC COATED) ORAL
COMMUNITY
Start: 2024-01-22

## 2024-02-05 ASSESSMENT — ENCOUNTER SYMPTOMS
SINUS PAIN: 0
STRIDOR: 0
DIARRHEA: 0
EYE DISCHARGE: 0
WHEEZING: 0
SORE THROAT: 1
COLOR CHANGE: 0
PHOTOPHOBIA: 0
RECTAL PAIN: 0
TROUBLE SWALLOWING: 0
CHEST TIGHTNESS: 0
COUGH: 1
BLOOD IN STOOL: 0
SHORTNESS OF BREATH: 1
VOICE CHANGE: 0
ABDOMINAL PAIN: 0
NAUSEA: 0
VOMITING: 0
CHOKING: 0
ABDOMINAL DISTENTION: 0
SINUS PRESSURE: 1
BACK PAIN: 0
CONSTIPATION: 0
ANAL BLEEDING: 0
EYE REDNESS: 0
APNEA: 0
EYE ITCHING: 0
FACIAL SWELLING: 0
RHINORRHEA: 1
EYE PAIN: 0

## 2024-02-05 NOTE — PROGRESS NOTES
guarding or rebound.      Hernia: No hernia is present.   Musculoskeletal:         General: No swelling, tenderness, deformity or signs of injury. Normal range of motion.      Cervical back: Normal range of motion and neck supple. No rigidity. No muscular tenderness.      Right lower leg: No edema.      Left lower leg: No edema.   Lymphadenopathy:      Cervical: No cervical adenopathy.   Skin:     General: Skin is warm and dry.      Capillary Refill: Capillary refill takes less than 2 seconds.      Coloration: Skin is not jaundiced or pale.      Findings: No bruising, erythema, lesion or rash.   Neurological:      General: No focal deficit present.      Mental Status: He is alert and oriented to person, place, and time. Mental status is at baseline.      Cranial Nerves: No cranial nerve deficit.      Sensory: No sensory deficit.      Motor: No weakness.      Coordination: Coordination normal.      Gait: Gait normal.      Deep Tendon Reflexes: Reflexes normal.   Psychiatric:         Behavior: Behavior normal.         Thought Content: Thought content normal.         Judgment: Judgment normal.      Comments: Intellectual disability at his baseline         Assessment and Plan:  Irvin was seen today for congestion.    Diagnoses and all orders for this visit:    Acute non-recurrent pansinusitis    Sinus congestion  -     POCT Influenza A/B  -     POCT COVID-19, Antigen    Primary osteoarthritis involving multiple joints  -     traMADol (ULTRAM) 50 MG tablet; Take 1 tablet by mouth every 6 hours as needed for Pain for up to 30 days. Max Daily Amount: 200 mg    Type 2 diabetes mellitus with stage 3b chronic kidney disease, without long-term current use of insulin (HCC)    Mental deficiency    Class 3 severe obesity due to excess calories with serious comorbidity and body mass index (BMI) of 45.0 to 49.9 in adult (HCC)    Stage 3b chronic kidney disease (HCC)    Hyperlipidemia, unspecified hyperlipidemia type    Full

## 2024-03-01 DIAGNOSIS — M15.9 PRIMARY OSTEOARTHRITIS INVOLVING MULTIPLE JOINTS: ICD-10-CM

## 2024-03-01 RX ORDER — TRAMADOL HYDROCHLORIDE 50 MG/1
50 TABLET ORAL 3 TIMES DAILY
Qty: 93 TABLET | Refills: 0 | Status: SHIPPED | OUTPATIENT
Start: 2024-03-01 | End: 2024-03-31

## 2024-03-05 DIAGNOSIS — M15.9 PRIMARY OSTEOARTHRITIS INVOLVING MULTIPLE JOINTS: ICD-10-CM

## 2024-03-05 RX ORDER — TRAMADOL HYDROCHLORIDE 50 MG/1
50 TABLET ORAL 3 TIMES DAILY
Qty: 93 TABLET | Refills: 0 | Status: SHIPPED | OUTPATIENT
Start: 2024-03-05 | End: 2024-04-04

## 2024-03-05 NOTE — TELEPHONE ENCOUNTER
Patients last appointment 2/5/2024.  Patients next scheduled appointment   Future Appointments   Date Time Provider Department Center   4/2/2024  9:00 AM SCHEDULE, YOMAIRA GUNN PAL [facility]   4/9/2024 10:00 AM Latha Almaraz, DARREN - CNP E. PAL [facility]

## 2024-04-02 ENCOUNTER — NURSE ONLY (OUTPATIENT)
Dept: FAMILY MEDICINE CLINIC | Age: 79
End: 2024-04-02
Payer: MEDICARE

## 2024-04-02 DIAGNOSIS — N18.32 TYPE 2 DIABETES MELLITUS WITH STAGE 3B CHRONIC KIDNEY DISEASE, WITHOUT LONG-TERM CURRENT USE OF INSULIN (HCC): ICD-10-CM

## 2024-04-02 DIAGNOSIS — E66.01 CLASS 3 SEVERE OBESITY DUE TO EXCESS CALORIES WITH SERIOUS COMORBIDITY AND BODY MASS INDEX (BMI) OF 45.0 TO 49.9 IN ADULT (HCC): ICD-10-CM

## 2024-04-02 DIAGNOSIS — E03.8 OTHER SPECIFIED HYPOTHYROIDISM: ICD-10-CM

## 2024-04-02 DIAGNOSIS — E11.22 TYPE 2 DIABETES MELLITUS WITH STAGE 3B CHRONIC KIDNEY DISEASE, WITHOUT LONG-TERM CURRENT USE OF INSULIN (HCC): ICD-10-CM

## 2024-04-02 DIAGNOSIS — E55.9 VITAMIN D DEFICIENCY: ICD-10-CM

## 2024-04-02 DIAGNOSIS — N18.32 STAGE 3B CHRONIC KIDNEY DISEASE (HCC): Primary | ICD-10-CM

## 2024-04-02 LAB
ALBUMIN SERPL-MCNC: 4 G/DL (ref 3.5–5.2)
ALP BLD-CCNC: 131 U/L (ref 40–129)
ALT SERPL-CCNC: 23 U/L (ref 0–40)
ANION GAP SERPL CALCULATED.3IONS-SCNC: 13 MMOL/L (ref 7–16)
AST SERPL-CCNC: 23 U/L (ref 0–39)
BASOPHILS ABSOLUTE: 0.03 K/UL (ref 0–0.2)
BASOPHILS RELATIVE PERCENT: 0 % (ref 0–2)
BILIRUB SERPL-MCNC: 0.5 MG/DL (ref 0–1.2)
BUN BLDV-MCNC: 28 MG/DL (ref 6–23)
CALCIUM SERPL-MCNC: 9.4 MG/DL (ref 8.6–10.2)
CHLORIDE BLD-SCNC: 104 MMOL/L (ref 98–107)
CHOLESTEROL: 154 MG/DL
CO2: 24 MMOL/L (ref 22–29)
CREAT SERPL-MCNC: 1.3 MG/DL (ref 0.7–1.2)
CREATININE URINE: 66.3 MG/DL (ref 40–278)
EOSINOPHILS ABSOLUTE: 0.11 K/UL (ref 0.05–0.5)
EOSINOPHILS RELATIVE PERCENT: 1 % (ref 0–6)
GFR SERPL CREATININE-BSD FRML MDRD: 57 ML/MIN/1.73M2
GLUCOSE BLD-MCNC: 113 MG/DL (ref 74–99)
HCT VFR BLD CALC: 49 % (ref 37–54)
HDLC SERPL-MCNC: 59 MG/DL
HEMOGLOBIN: 15.9 G/DL (ref 12.5–16.5)
IMMATURE GRANULOCYTES %: 0 % (ref 0–5)
IMMATURE GRANULOCYTES ABSOLUTE: 0.03 K/UL (ref 0–0.58)
LDL CHOLESTEROL: 84 MG/DL
LYMPHOCYTES ABSOLUTE: 2.03 K/UL (ref 1.5–4)
LYMPHOCYTES RELATIVE PERCENT: 25 % (ref 20–42)
MAGNESIUM: 2 MG/DL (ref 1.6–2.6)
MCH RBC QN AUTO: 33.4 PG (ref 26–35)
MCHC RBC AUTO-ENTMCNC: 32.4 G/DL (ref 32–34.5)
MCV RBC AUTO: 102.9 FL (ref 80–99.9)
MICROALBUMIN/CREAT 24H UR: 437 MG/L (ref 0–19)
MICROALBUMIN/CREAT UR-RTO: 658 MCG/MG CREAT (ref 0–30)
MONOCYTES ABSOLUTE: 0.87 K/UL (ref 0.1–0.95)
MONOCYTES RELATIVE PERCENT: 11 % (ref 2–12)
NEUTROPHILS ABSOLUTE: 5.11 K/UL (ref 1.8–7.3)
NEUTROPHILS RELATIVE PERCENT: 63 % (ref 43–80)
PDW BLD-RTO: 14.1 % (ref 11.5–15)
PHOSPHORUS: 4 MG/DL (ref 2.5–4.5)
PLATELET # BLD: 278 K/UL (ref 130–450)
PMV BLD AUTO: 9.6 FL (ref 7–12)
POTASSIUM SERPL-SCNC: 5.1 MMOL/L (ref 3.5–5)
RBC # BLD: 4.76 M/UL (ref 3.8–5.8)
SODIUM BLD-SCNC: 141 MMOL/L (ref 132–146)
T4 TOTAL: 6.5 UG/DL (ref 4.5–11.7)
TOTAL PROTEIN: 6.9 G/DL (ref 6.4–8.3)
TRIGL SERPL-MCNC: 57 MG/DL
TSH SERPL DL<=0.05 MIU/L-ACNC: 3.49 UIU/ML (ref 0.27–4.2)
URIC ACID: 5.1 MG/DL (ref 3.4–7)
VLDLC SERPL CALC-MCNC: 11 MG/DL
WBC # BLD: 8.2 K/UL (ref 4.5–11.5)

## 2024-04-02 PROCEDURE — 36415 COLL VENOUS BLD VENIPUNCTURE: CPT | Performed by: NURSE PRACTITIONER

## 2024-04-03 LAB
CREATININE URINE: 65.7 MG/DL (ref 40–278)
TOTAL PROTEIN, URINE: 71 MG/DL (ref 0–12)
URINE TOTAL PROTEIN CREATININE RATIO: 1.08 (ref 0–0.2)
VITAMIN D 25-HYDROXY: 48.1 NG/ML (ref 30–100)

## 2024-04-09 ENCOUNTER — OFFICE VISIT (OUTPATIENT)
Dept: FAMILY MEDICINE CLINIC | Age: 79
End: 2024-04-09
Payer: MEDICARE

## 2024-04-09 VITALS
RESPIRATION RATE: 18 BRPM | HEART RATE: 94 BPM | OXYGEN SATURATION: 95 % | BODY MASS INDEX: 42.65 KG/M2 | SYSTOLIC BLOOD PRESSURE: 112 MMHG | TEMPERATURE: 97.3 F | HEIGHT: 65 IN | DIASTOLIC BLOOD PRESSURE: 72 MMHG | WEIGHT: 256 LBS

## 2024-04-09 DIAGNOSIS — N18.32 TYPE 2 DIABETES MELLITUS WITH STAGE 3B CHRONIC KIDNEY DISEASE, WITHOUT LONG-TERM CURRENT USE OF INSULIN (HCC): ICD-10-CM

## 2024-04-09 DIAGNOSIS — E55.9 VITAMIN D DEFICIENCY: ICD-10-CM

## 2024-04-09 DIAGNOSIS — E66.01 CLASS 3 SEVERE OBESITY DUE TO EXCESS CALORIES WITH SERIOUS COMORBIDITY AND BODY MASS INDEX (BMI) OF 45.0 TO 49.9 IN ADULT (HCC): ICD-10-CM

## 2024-04-09 DIAGNOSIS — I12.9 HYPERTENSIVE RENAL DISEASE: ICD-10-CM

## 2024-04-09 DIAGNOSIS — B35.4 TINEA CORPORIS: Primary | ICD-10-CM

## 2024-04-09 DIAGNOSIS — B36.9 FUNGAL DERMATITIS: ICD-10-CM

## 2024-04-09 DIAGNOSIS — J01.40 ACUTE NON-RECURRENT PANSINUSITIS: ICD-10-CM

## 2024-04-09 DIAGNOSIS — E78.5 HYPERLIPIDEMIA, UNSPECIFIED HYPERLIPIDEMIA TYPE: ICD-10-CM

## 2024-04-09 DIAGNOSIS — R39.81 URINARY INCONTINENCE DUE TO COGNITIVE IMPAIRMENT: ICD-10-CM

## 2024-04-09 DIAGNOSIS — E11.22 TYPE 2 DIABETES MELLITUS WITH STAGE 3B CHRONIC KIDNEY DISEASE, WITHOUT LONG-TERM CURRENT USE OF INSULIN (HCC): ICD-10-CM

## 2024-04-09 DIAGNOSIS — Z00.00 MEDICARE ANNUAL WELLNESS VISIT, SUBSEQUENT: ICD-10-CM

## 2024-04-09 PROCEDURE — 3044F HG A1C LEVEL LT 7.0%: CPT | Performed by: NURSE PRACTITIONER

## 2024-04-09 PROCEDURE — G0439 PPPS, SUBSEQ VISIT: HCPCS | Performed by: NURSE PRACTITIONER

## 2024-04-09 PROCEDURE — 1123F ACP DISCUSS/DSCN MKR DOCD: CPT | Performed by: NURSE PRACTITIONER

## 2024-04-09 RX ORDER — TRAMADOL HYDROCHLORIDE 50 MG/1
50 TABLET ORAL 3 TIMES DAILY
COMMUNITY

## 2024-04-09 RX ORDER — UBIQUINOL 100 MG
CAPSULE ORAL
COMMUNITY
Start: 2024-02-14

## 2024-04-09 SDOH — ECONOMIC STABILITY: FOOD INSECURITY: WITHIN THE PAST 12 MONTHS, YOU WORRIED THAT YOUR FOOD WOULD RUN OUT BEFORE YOU GOT MONEY TO BUY MORE.: NEVER TRUE

## 2024-04-09 SDOH — ECONOMIC STABILITY: TRANSPORTATION INSECURITY
IN THE PAST 12 MONTHS, HAS LACK OF TRANSPORTATION KEPT YOU FROM MEETINGS, WORK, OR FROM GETTING THINGS NEEDED FOR DAILY LIVING?: NO

## 2024-04-09 SDOH — ECONOMIC STABILITY: INCOME INSECURITY: HOW HARD IS IT FOR YOU TO PAY FOR THE VERY BASICS LIKE FOOD, HOUSING, MEDICAL CARE, AND HEATING?: NOT HARD AT ALL

## 2024-04-09 SDOH — ECONOMIC STABILITY: FOOD INSECURITY: WITHIN THE PAST 12 MONTHS, THE FOOD YOU BOUGHT JUST DIDN'T LAST AND YOU DIDN'T HAVE MONEY TO GET MORE.: NEVER TRUE

## 2024-04-09 SDOH — HEALTH STABILITY: PHYSICAL HEALTH: ON AVERAGE, HOW MANY DAYS PER WEEK DO YOU ENGAGE IN MODERATE TO STRENUOUS EXERCISE (LIKE A BRISK WALK)?: 0 DAYS

## 2024-04-09 ASSESSMENT — ENCOUNTER SYMPTOMS
WHEEZING: 0
CHOKING: 0
VOICE CHANGE: 0
ABDOMINAL PAIN: 0
CHEST TIGHTNESS: 0
BLOOD IN STOOL: 0
STRIDOR: 0
BACK PAIN: 0
EYE REDNESS: 0
FACIAL SWELLING: 0
VOMITING: 0
EYE ITCHING: 0
SINUS PAIN: 0
CONSTIPATION: 0
EYE DISCHARGE: 0
EYE PAIN: 0
NAUSEA: 0
ANAL BLEEDING: 0
DIARRHEA: 0
ABDOMINAL DISTENTION: 0
TROUBLE SWALLOWING: 0
COLOR CHANGE: 0
APNEA: 0
PHOTOPHOBIA: 0

## 2024-04-09 ASSESSMENT — PATIENT HEALTH QUESTIONNAIRE - PHQ9
2. FEELING DOWN, DEPRESSED OR HOPELESS: NOT AT ALL
SUM OF ALL RESPONSES TO PHQ QUESTIONS 1-9: 0
SUM OF ALL RESPONSES TO PHQ QUESTIONS 1-9: 0
SUM OF ALL RESPONSES TO PHQ9 QUESTIONS 1 & 2: 0
1. LITTLE INTEREST OR PLEASURE IN DOING THINGS: NOT AT ALL
SUM OF ALL RESPONSES TO PHQ QUESTIONS 1-9: 0
SUM OF ALL RESPONSES TO PHQ QUESTIONS 1-9: 0

## 2024-04-09 ASSESSMENT — LIFESTYLE VARIABLES
HOW OFTEN DO YOU HAVE A DRINK CONTAINING ALCOHOL: NEVER
HOW MANY STANDARD DRINKS CONTAINING ALCOHOL DO YOU HAVE ON A TYPICAL DAY: PATIENT DOES NOT DRINK
HOW MANY STANDARD DRINKS CONTAINING ALCOHOL DO YOU HAVE ON A TYPICAL DAY: 0
HOW OFTEN DO YOU HAVE SIX OR MORE DRINKS ON ONE OCCASION: 1
HOW OFTEN DO YOU HAVE A DRINK CONTAINING ALCOHOL: 1

## 2024-04-09 NOTE — PROGRESS NOTES
[1]          Activity, Diet, and Weight:  On average, how many days per week do you engage in moderate to strenuous exercise (like a brisk walk)?: 0 days       Do you eat balanced/healthy meals regularly?: (!) No    Body mass index is 42.6 kg/m². (!) Abnormal    Do you eat balanced/healthy meals regularly Interventions:  Patient advised to follow-up in this office for further evaluation and treatment  Obesity Interventions:  Patient advised to follow-up in this office for further evaluation and treatment                ADL's:   Patient reports needing help with:  Select all that apply: (!) Dressing, Bathing, Toileting, Walking/Balance  Select all that apply: (!) Laundry, Housekeeping, Banking/Finances, Shopping, Food Preparation, Transportation, Taking Medications  Interventions:  Patient advised to follow up in the office for further evaluation and treatment    Advanced Directives:  Do you have a Living Will?: (!) No    Intervention:  has NO advanced directive - not interested in additional information                     Objective   Vitals:    04/09/24 0954   BP: 112/72   Site: Left Upper Arm   Position: Sitting   Pulse: 94   Resp: 18   Temp: 97.3 °F (36.3 °C)   SpO2: 95%   Weight: 116.1 kg (256 lb)   Height: 1.651 m (5' 5\")      Body mass index is 42.6 kg/m².               No Known Allergies  Prior to Visit Medications    Medication Sig Taking? Authorizing Provider   Alcohol Swabs (ALCOHOL PREP) 70 % PADS  Yes Katie Hill MD   traMADol (ULTRAM) 50 MG tablet Take 1 tablet by mouth 3 times daily. Max Daily Amount: 150 mg Yes Katie Hill MD   nystatin-triamcinolone (MYCOLOG II) 920768-5.1 UNIT/GM-% cream Apply topically 2 times daily. Yes Latha Almaraz, APRN - CNP   vitamin C (ASCORBIC ACID) 500 MG tablet as needed Yes Katie Hill MD   aspirin 325 MG EC tablet  Yes Katie Hill MD   BANOPHEN 25 MG capsule every 4 hours as needed for Allergies Yes Katie Hill MD   CHEST

## 2024-05-02 DIAGNOSIS — M15.9 PRIMARY OSTEOARTHRITIS INVOLVING MULTIPLE JOINTS: Primary | ICD-10-CM

## 2024-05-02 RX ORDER — TRAMADOL HYDROCHLORIDE 50 MG/1
50 TABLET ORAL EVERY 8 HOURS PRN
Qty: 90 TABLET | Refills: 0 | Status: SHIPPED | OUTPATIENT
Start: 2024-05-02 | End: 2024-06-01

## 2024-05-02 NOTE — TELEPHONE ENCOUNTER
Patients last appointment Visit date not found.  Patients next scheduled appointment   Future Appointments   Date Time Provider Department Center   7/9/2024  9:00 AM Latha Almaraz, DARREN - CNP E. [facility]

## 2024-05-03 NOTE — PROGRESS NOTES
23  Dayo Muse : 1945 Sex: male  Age: 69 y.o. Chief Complaint   Patient presents with    Other     Scab on left arm, redness around it, wanted to make sure not infected. Also around that thumb has been bleeding        Nicky Boucher is here today with his threshold nurse to look at his left arm which has a scab on it with some redness around it. Nicky Boucher reports that he bumped his arm when he was going to the bathroom. He denies it being painful. He also gets some bleeding under his thumb where he bumped it. Review of Systems   Constitutional:  Negative for activity change, appetite change, chills, diaphoresis, fatigue, fever and unexpected weight change. HENT:  Negative for congestion, dental problem, drooling, ear discharge, ear pain, facial swelling, hearing loss, mouth sores, nosebleeds, postnasal drip, rhinorrhea, sinus pressure, sinus pain, sneezing, sore throat, tinnitus, trouble swallowing and voice change. Eyes:  Negative for photophobia, pain, discharge, redness, itching and visual disturbance. Respiratory:  Negative for apnea, cough, choking, chest tightness, shortness of breath, wheezing and stridor. Cardiovascular:  Negative for chest pain, palpitations and leg swelling. Gastrointestinal:  Negative for abdominal distention, abdominal pain, anal bleeding, blood in stool, constipation, diarrhea, nausea, rectal pain and vomiting. Endocrine: Negative for cold intolerance, heat intolerance, polydipsia, polyphagia and polyuria. Genitourinary:  Negative for decreased urine volume, difficulty urinating, dysuria, enuresis, flank pain, frequency, genital sores, hematuria and urgency. Musculoskeletal:  Negative for arthralgias, back pain, gait problem, joint swelling, myalgias, neck pain and neck stiffness. Skin:  Positive for wound. Negative for color change, pallor and rash.    Allergic/Immunologic: Negative for environmental allergies, food allergies and immunocompromised
0 (no pain/absence of nonverbal indicators of pain)

## 2024-06-02 DIAGNOSIS — M15.9 PRIMARY OSTEOARTHRITIS INVOLVING MULTIPLE JOINTS: Primary | ICD-10-CM

## 2024-06-03 ENCOUNTER — TELEPHONE (OUTPATIENT)
Dept: FAMILY MEDICINE CLINIC | Age: 79
End: 2024-06-03

## 2024-06-03 DIAGNOSIS — M15.9 PRIMARY OSTEOARTHRITIS INVOLVING MULTIPLE JOINTS: Primary | ICD-10-CM

## 2024-06-03 RX ORDER — TRAMADOL HYDROCHLORIDE 50 MG/1
50 TABLET ORAL EVERY 6 HOURS PRN
Qty: 90 TABLET | Refills: 0 | Status: SHIPPED
Start: 2024-06-03 | End: 2024-06-03

## 2024-06-03 RX ORDER — TRAMADOL HYDROCHLORIDE 50 MG/1
50 TABLET ORAL EVERY 8 HOURS PRN
Qty: 90 TABLET | Refills: 0 | Status: SHIPPED | OUTPATIENT
Start: 2024-06-03 | End: 2024-07-03

## 2024-06-03 NOTE — TELEPHONE ENCOUNTER
Patients last appointment 4/9/2024.  Patients next scheduled appointment   Future Appointments   Date Time Provider Department Center   7/9/2024  9:00 AM Latha Almaraz, DARREN - CNP E. [facility]

## 2024-06-03 NOTE — TELEPHONE ENCOUNTER
[facility] called and states that they need his Tramadol script fixed to say that he takes 1 tab 3 times a day and then sent back over

## 2024-06-29 DIAGNOSIS — M15.9 PRIMARY OSTEOARTHRITIS INVOLVING MULTIPLE JOINTS: ICD-10-CM

## 2024-07-01 RX ORDER — TRAMADOL HYDROCHLORIDE 50 MG/1
50 TABLET ORAL EVERY 6 HOURS PRN
Qty: 93 TABLET | Refills: 0 | Status: SHIPPED
Start: 2024-07-01 | End: 2024-07-03 | Stop reason: SDUPTHER

## 2024-07-03 DIAGNOSIS — M15.9 PRIMARY OSTEOARTHRITIS INVOLVING MULTIPLE JOINTS: ICD-10-CM

## 2024-07-03 NOTE — TELEPHONE ENCOUNTER
Patients last appointment 4/9/2024.  Patients next scheduled appointment   Future Appointments   Date Time Provider Department Center   7/9/2024  9:00 AM Latha Almaraz, DARREN - CNP E. [facility]

## 2024-07-05 RX ORDER — TRAMADOL HYDROCHLORIDE 50 MG/1
50 TABLET ORAL EVERY 6 HOURS PRN
Qty: 93 TABLET | Refills: 0 | Status: SHIPPED | OUTPATIENT
Start: 2024-07-05 | End: 2024-08-04

## 2024-07-12 ENCOUNTER — OFFICE VISIT (OUTPATIENT)
Dept: FAMILY MEDICINE CLINIC | Age: 79
End: 2024-07-12
Payer: MEDICARE

## 2024-07-12 VITALS
TEMPERATURE: 97.1 F | HEART RATE: 89 BPM | HEIGHT: 65 IN | BODY MASS INDEX: 43.87 KG/M2 | OXYGEN SATURATION: 99 % | WEIGHT: 263.3 LBS | DIASTOLIC BLOOD PRESSURE: 78 MMHG | SYSTOLIC BLOOD PRESSURE: 124 MMHG

## 2024-07-12 DIAGNOSIS — E66.01 CLASS 3 SEVERE OBESITY DUE TO EXCESS CALORIES WITH SERIOUS COMORBIDITY AND BODY MASS INDEX (BMI) OF 45.0 TO 49.9 IN ADULT (HCC): ICD-10-CM

## 2024-07-12 DIAGNOSIS — N18.32 TYPE 2 DIABETES MELLITUS WITH STAGE 3B CHRONIC KIDNEY DISEASE, WITHOUT LONG-TERM CURRENT USE OF INSULIN (HCC): Primary | ICD-10-CM

## 2024-07-12 DIAGNOSIS — N18.32 STAGE 3B CHRONIC KIDNEY DISEASE (HCC): ICD-10-CM

## 2024-07-12 DIAGNOSIS — E11.22 TYPE 2 DIABETES MELLITUS WITH STAGE 3B CHRONIC KIDNEY DISEASE, WITHOUT LONG-TERM CURRENT USE OF INSULIN (HCC): Primary | ICD-10-CM

## 2024-07-12 DIAGNOSIS — F79 MENTAL DEFICIENCY: ICD-10-CM

## 2024-07-12 DIAGNOSIS — R01.1 CARDIAC MURMUR, UNSPECIFIED: ICD-10-CM

## 2024-07-12 DIAGNOSIS — E78.5 HYPERLIPIDEMIA, UNSPECIFIED HYPERLIPIDEMIA TYPE: ICD-10-CM

## 2024-07-12 DIAGNOSIS — M15.9 PRIMARY OSTEOARTHRITIS INVOLVING MULTIPLE JOINTS: ICD-10-CM

## 2024-07-12 PROCEDURE — 3044F HG A1C LEVEL LT 7.0%: CPT | Performed by: NURSE PRACTITIONER

## 2024-07-12 PROCEDURE — G8427 DOCREV CUR MEDS BY ELIG CLIN: HCPCS | Performed by: NURSE PRACTITIONER

## 2024-07-12 PROCEDURE — 99214 OFFICE O/P EST MOD 30 MIN: CPT | Performed by: NURSE PRACTITIONER

## 2024-07-12 PROCEDURE — 83036 HEMOGLOBIN GLYCOSYLATED A1C: CPT | Performed by: NURSE PRACTITIONER

## 2024-07-12 PROCEDURE — 1123F ACP DISCUSS/DSCN MKR DOCD: CPT | Performed by: NURSE PRACTITIONER

## 2024-07-12 PROCEDURE — 1036F TOBACCO NON-USER: CPT | Performed by: NURSE PRACTITIONER

## 2024-07-12 PROCEDURE — G8417 CALC BMI ABV UP PARAM F/U: HCPCS | Performed by: NURSE PRACTITIONER

## 2024-07-12 RX ORDER — CLOBETASOL PROPIONATE 0.5 MG/G
OINTMENT TOPICAL
Qty: 60 G | Refills: 5 | Status: SHIPPED | OUTPATIENT
Start: 2024-07-12

## 2024-07-12 ASSESSMENT — ENCOUNTER SYMPTOMS
STRIDOR: 0
COLOR CHANGE: 0
SINUS PAIN: 0
SORE THROAT: 0
TROUBLE SWALLOWING: 0
VOICE CHANGE: 0
SINUS PRESSURE: 0
EYE REDNESS: 0
EYE DISCHARGE: 0
RECTAL PAIN: 0
APNEA: 0
CONSTIPATION: 0
FACIAL SWELLING: 0
EYE PAIN: 0
RHINORRHEA: 0
ANAL BLEEDING: 0
COUGH: 0
WHEEZING: 0
NAUSEA: 0
EYE ITCHING: 0
ABDOMINAL DISTENTION: 0
VOMITING: 0
BLOOD IN STOOL: 0
ABDOMINAL PAIN: 0
PHOTOPHOBIA: 0
CHEST TIGHTNESS: 0
SHORTNESS OF BREATH: 0
CHOKING: 0
DIARRHEA: 0
BACK PAIN: 0

## 2024-07-12 NOTE — PROGRESS NOTES
24  Irvin Hunt : 1945 Sex: male  Age: 78 y.o.    Chief Complaint   Patient presents with    Other     Right leg has had a rash on it for awhile now , also wanted to see if he could get the RSV vaccine ?       Patient has an area to his right lower leg that is dry and scaly and approximately 2 cm in diameter.  He states that it seems to improve and then it gets worse again.  He has had nystatin triamcinolone which has seemed to help in the past.  Unfortunately as soon as he stops taking it it comes back.    Patient has been doing well with his blood sugars and his A1c today is 6.2.  He currently denies complaints.        Review of Systems   Constitutional:  Negative for activity change, appetite change, chills, diaphoresis, fatigue, fever and unexpected weight change.   HENT:  Negative for congestion, dental problem, drooling, ear discharge, ear pain, facial swelling, hearing loss, mouth sores, nosebleeds, postnasal drip, rhinorrhea, sinus pressure, sinus pain, sneezing, sore throat, tinnitus, trouble swallowing and voice change.    Eyes:  Negative for photophobia, pain, discharge, redness, itching and visual disturbance.   Respiratory:  Negative for apnea, cough, choking, chest tightness, shortness of breath, wheezing and stridor.    Cardiovascular:  Negative for chest pain, palpitations and leg swelling.   Gastrointestinal:  Negative for abdominal distention, abdominal pain, anal bleeding, blood in stool, constipation, diarrhea, nausea, rectal pain and vomiting.   Endocrine: Negative for cold intolerance, heat intolerance, polydipsia, polyphagia and polyuria.   Genitourinary:  Negative for decreased urine volume, difficulty urinating, dysuria, enuresis, flank pain, frequency, genital sores, hematuria and urgency.   Musculoskeletal:  Negative for arthralgias, back pain, gait problem, joint swelling, myalgias, neck pain and neck stiffness.   Skin:  Positive for rash. Negative for color change,

## 2024-08-06 ENCOUNTER — TELEMEDICINE (OUTPATIENT)
Dept: FAMILY MEDICINE CLINIC | Age: 79
End: 2024-08-06
Payer: MEDICARE

## 2024-08-06 DIAGNOSIS — N18.32 STAGE 3B CHRONIC KIDNEY DISEASE (HCC): ICD-10-CM

## 2024-08-06 DIAGNOSIS — N18.32 TYPE 2 DIABETES MELLITUS WITH STAGE 3B CHRONIC KIDNEY DISEASE, WITHOUT LONG-TERM CURRENT USE OF INSULIN (HCC): ICD-10-CM

## 2024-08-06 DIAGNOSIS — E11.22 TYPE 2 DIABETES MELLITUS WITH STAGE 3B CHRONIC KIDNEY DISEASE, WITHOUT LONG-TERM CURRENT USE OF INSULIN (HCC): ICD-10-CM

## 2024-08-06 DIAGNOSIS — M15.9 PRIMARY OSTEOARTHRITIS INVOLVING MULTIPLE JOINTS: ICD-10-CM

## 2024-08-06 DIAGNOSIS — R01.1 CARDIAC MURMUR, UNSPECIFIED: ICD-10-CM

## 2024-08-06 DIAGNOSIS — E66.01 CLASS 3 SEVERE OBESITY DUE TO EXCESS CALORIES WITH SERIOUS COMORBIDITY AND BODY MASS INDEX (BMI) OF 45.0 TO 49.9 IN ADULT (HCC): ICD-10-CM

## 2024-08-06 DIAGNOSIS — U07.1 COVID: Primary | ICD-10-CM

## 2024-08-06 DIAGNOSIS — E78.5 HYPERLIPIDEMIA, UNSPECIFIED HYPERLIPIDEMIA TYPE: ICD-10-CM

## 2024-08-06 PROCEDURE — 3044F HG A1C LEVEL LT 7.0%: CPT | Performed by: NURSE PRACTITIONER

## 2024-08-06 PROCEDURE — 99213 OFFICE O/P EST LOW 20 MIN: CPT | Performed by: NURSE PRACTITIONER

## 2024-08-06 PROCEDURE — G8417 CALC BMI ABV UP PARAM F/U: HCPCS | Performed by: NURSE PRACTITIONER

## 2024-08-06 PROCEDURE — G8427 DOCREV CUR MEDS BY ELIG CLIN: HCPCS | Performed by: NURSE PRACTITIONER

## 2024-08-06 PROCEDURE — 1036F TOBACCO NON-USER: CPT | Performed by: NURSE PRACTITIONER

## 2024-08-06 PROCEDURE — 1123F ACP DISCUSS/DSCN MKR DOCD: CPT | Performed by: NURSE PRACTITIONER

## 2024-08-06 RX ORDER — ONDANSETRON 4 MG/1
4 TABLET, ORALLY DISINTEGRATING ORAL 3 TIMES DAILY PRN
Qty: 21 TABLET | Refills: 0 | Status: SHIPPED | OUTPATIENT
Start: 2024-08-06

## 2024-08-06 RX ORDER — ERGOCALCIFEROL 1.25 MG/1
CAPSULE ORAL
COMMUNITY
Start: 2024-07-22

## 2024-08-06 RX ORDER — PREDNISONE 20 MG/1
20 TABLET ORAL DAILY
Qty: 10 TABLET | Refills: 0 | Status: SHIPPED | OUTPATIENT
Start: 2024-08-06 | End: 2024-08-16

## 2024-08-06 ASSESSMENT — ENCOUNTER SYMPTOMS
NAUSEA: 1
FACIAL SWELLING: 0
ABDOMINAL PAIN: 0
EYE DISCHARGE: 0
CONSTIPATION: 0
DIARRHEA: 0
SHORTNESS OF BREATH: 1
COUGH: 1
CHEST TIGHTNESS: 1
STRIDOR: 0
ABDOMINAL DISTENTION: 0
WHEEZING: 1
VOMITING: 0
BLOOD IN STOOL: 0
SINUS PAIN: 0
PHOTOPHOBIA: 0
ANAL BLEEDING: 0
EYE REDNESS: 0
SORE THROAT: 0
TROUBLE SWALLOWING: 0
EYE ITCHING: 0
CHOKING: 0
SINUS PRESSURE: 0
RECTAL PAIN: 0
BACK PAIN: 0
VOICE CHANGE: 0
EYE PAIN: 0
COLOR CHANGE: 0
APNEA: 0
RHINORRHEA: 0

## 2024-08-06 NOTE — PROGRESS NOTES
24  Irvin Hunt : 1945 Sex: male  Age: 78 y.o.    Chief Complaint   Patient presents with    Positive For Covid-19     Chest congestion, cough, pale, O2 drops when walking-goes to the 80's, lungs are diminished        Irvin is seen by virtual examination.  He has a threshold patient and is seen in accompanied by his nurse.  He has been not feeling well over the course of the last week initial COVID testing was negative however today he is testing positive for COVID.  His symptoms include shortness of breath, cough, wheezing, nausea, dry heaves.  He feels fatigued.        Review of Systems   Constitutional:  Positive for fatigue. Negative for activity change, appetite change, chills, diaphoresis, fever and unexpected weight change.   HENT:  Positive for congestion. Negative for dental problem, drooling, ear discharge, ear pain, facial swelling, hearing loss, mouth sores, nosebleeds, postnasal drip, rhinorrhea, sinus pressure, sinus pain, sneezing, sore throat, tinnitus, trouble swallowing and voice change.    Eyes:  Negative for photophobia, pain, discharge, redness, itching and visual disturbance.   Respiratory:  Positive for cough, chest tightness, shortness of breath and wheezing. Negative for apnea, choking and stridor.    Cardiovascular:  Negative for chest pain, palpitations and leg swelling.   Gastrointestinal:  Positive for nausea. Negative for abdominal distention, abdominal pain, anal bleeding, blood in stool, constipation, diarrhea, rectal pain and vomiting.   Endocrine: Negative for cold intolerance, heat intolerance, polydipsia, polyphagia and polyuria.   Genitourinary:  Negative for decreased urine volume, difficulty urinating, dysuria, enuresis, flank pain, frequency, genital sores, hematuria and urgency.   Musculoskeletal:  Negative for arthralgias, back pain, gait problem, joint swelling, myalgias, neck pain and neck stiffness.   Skin:  Negative for color change, pallor, rash and

## 2024-09-20 ENCOUNTER — TELEPHONE (OUTPATIENT)
Dept: PHYSICAL THERAPY | Age: 79
End: 2024-09-20

## 2024-09-30 DIAGNOSIS — M15.0 PRIMARY OSTEOARTHRITIS INVOLVING MULTIPLE JOINTS: Primary | ICD-10-CM

## 2024-09-30 RX ORDER — TRAMADOL HYDROCHLORIDE 50 MG/1
50 TABLET ORAL EVERY 8 HOURS PRN
Qty: 90 TABLET | Refills: 2 | Status: SHIPPED | OUTPATIENT
Start: 2024-09-30 | End: 2025-01-01

## 2024-10-14 ENCOUNTER — OFFICE VISIT (OUTPATIENT)
Dept: FAMILY MEDICINE CLINIC | Age: 79
End: 2024-10-14

## 2024-10-14 VITALS
TEMPERATURE: 97.8 F | BODY MASS INDEX: 43.12 KG/M2 | SYSTOLIC BLOOD PRESSURE: 138 MMHG | OXYGEN SATURATION: 96 % | DIASTOLIC BLOOD PRESSURE: 74 MMHG | HEART RATE: 103 BPM | HEIGHT: 65 IN | WEIGHT: 258.8 LBS

## 2024-10-14 DIAGNOSIS — E78.5 HYPERLIPIDEMIA, UNSPECIFIED HYPERLIPIDEMIA TYPE: ICD-10-CM

## 2024-10-14 DIAGNOSIS — R01.1 CARDIAC MURMUR, UNSPECIFIED: ICD-10-CM

## 2024-10-14 DIAGNOSIS — F79 MENTAL DEFICIENCY: ICD-10-CM

## 2024-10-14 DIAGNOSIS — E66.813 CLASS 3 SEVERE OBESITY DUE TO EXCESS CALORIES WITH SERIOUS COMORBIDITY AND BODY MASS INDEX (BMI) OF 45.0 TO 49.9 IN ADULT: ICD-10-CM

## 2024-10-14 DIAGNOSIS — E11.22 TYPE 2 DIABETES MELLITUS WITH STAGE 3B CHRONIC KIDNEY DISEASE, WITHOUT LONG-TERM CURRENT USE OF INSULIN (HCC): Primary | ICD-10-CM

## 2024-10-14 DIAGNOSIS — E66.01 CLASS 3 SEVERE OBESITY DUE TO EXCESS CALORIES WITH SERIOUS COMORBIDITY AND BODY MASS INDEX (BMI) OF 45.0 TO 49.9 IN ADULT: ICD-10-CM

## 2024-10-14 DIAGNOSIS — N18.32 TYPE 2 DIABETES MELLITUS WITH STAGE 3B CHRONIC KIDNEY DISEASE, WITHOUT LONG-TERM CURRENT USE OF INSULIN (HCC): Primary | ICD-10-CM

## 2024-10-14 DIAGNOSIS — N18.32 STAGE 3B CHRONIC KIDNEY DISEASE (HCC): ICD-10-CM

## 2024-10-14 DIAGNOSIS — E03.8 OTHER SPECIFIED HYPOTHYROIDISM: ICD-10-CM

## 2024-10-14 LAB
BASOPHILS ABSOLUTE: 0.03 K/UL (ref 0–0.2)
BASOPHILS RELATIVE PERCENT: 0 % (ref 0–2)
CREATININE URINE: 58.5 MG/DL (ref 40–278)
EOSINOPHILS ABSOLUTE: 0.12 K/UL (ref 0.05–0.5)
EOSINOPHILS RELATIVE PERCENT: 2 % (ref 0–6)
HBA1C MFR BLD: 6.3 %
HCT VFR BLD CALC: 45.8 % (ref 37–54)
HEMOGLOBIN: 14 G/DL (ref 12.5–16.5)
IMMATURE GRANULOCYTES %: 1 % (ref 0–5)
IMMATURE GRANULOCYTES ABSOLUTE: 0.04 K/UL (ref 0–0.58)
LYMPHOCYTES ABSOLUTE: 1.99 K/UL (ref 1.5–4)
LYMPHOCYTES RELATIVE PERCENT: 27 % (ref 20–42)
MAGNESIUM: 1.8 MG/DL (ref 1.6–2.6)
MCH RBC QN AUTO: 32.4 PG (ref 26–35)
MCHC RBC AUTO-ENTMCNC: 30.6 G/DL (ref 32–34.5)
MCV RBC AUTO: 106 FL (ref 80–99.9)
MICROALBUMIN/CREAT 24H UR: 559 MG/L (ref 0–19)
MICROALBUMIN/CREAT UR-RTO: 955 MCG/MG CREAT (ref 0–30)
MONOCYTES ABSOLUTE: 0.61 K/UL (ref 0.1–0.95)
MONOCYTES RELATIVE PERCENT: 8 % (ref 2–12)
NEUTROPHILS ABSOLUTE: 4.49 K/UL (ref 1.8–7.3)
NEUTROPHILS RELATIVE PERCENT: 62 % (ref 43–80)
PDW BLD-RTO: 14.8 % (ref 11.5–15)
PHOSPHORUS: 3.6 MG/DL (ref 2.5–4.5)
PLATELET # BLD: 266 K/UL (ref 130–450)
PMV BLD AUTO: 9.6 FL (ref 7–12)
RBC # BLD: 4.32 M/UL (ref 3.8–5.8)
WBC # BLD: 7.3 K/UL (ref 4.5–11.5)

## 2024-10-14 ASSESSMENT — ENCOUNTER SYMPTOMS
SINUS PAIN: 0
SORE THROAT: 0
EYE PAIN: 0
SHORTNESS OF BREATH: 0
EYE ITCHING: 0
RHINORRHEA: 0
VOMITING: 0
COUGH: 0
CHOKING: 0
ANAL BLEEDING: 0
ABDOMINAL DISTENTION: 0
PHOTOPHOBIA: 0
TROUBLE SWALLOWING: 0
ABDOMINAL PAIN: 0
CHEST TIGHTNESS: 0
WHEEZING: 0
COLOR CHANGE: 0
APNEA: 0
FACIAL SWELLING: 0
SINUS PRESSURE: 0
NAUSEA: 0
VOICE CHANGE: 0
BLOOD IN STOOL: 0
RECTAL PAIN: 0
EYE REDNESS: 0
EYE DISCHARGE: 0
STRIDOR: 0
BACK PAIN: 0
DIARRHEA: 0
CONSTIPATION: 0

## 2024-10-14 NOTE — PROGRESS NOTES
Lab draw Right AC 22 x 1 1/4\". Venipuncture x 1  
oxide (CALMOSEPTINE) 0.44-20.625 % OINT ointment, Apply 2 times daily to affected area. Max 30 ml per day. (Patient taking differently: 2 times daily as needed Apply 2 times daily to affected area. Max 30 ml per day.), Disp: 1 each, Rfl: 5    GEMTESA 75 MG TABS tablet, , Disp: , Rfl:     FLUoxetine (PROZAC) 20 MG capsule, Take 1 capsule by mouth daily, Disp: , Rfl:     glucose monitoring (FREESTYLE FREEDOM) kit, 1 kit by Does not apply route daily, Disp: 1 kit, Rfl: 0    blood glucose monitor strips, Test 2 times a day & as needed for symptoms of irregular blood glucose. Dispense sufficient amount for indicated testing frequency plus additional to accommodate PRN testing needs., Disp: 100 strip, Rfl: 5    Lancets MISC, 1 each by Does not apply route 4 times daily, Disp: 600 each, Rfl: 1    Azelastine HCl 137 MCG/SPRAY SOLN, 2 sprays by Nasal route daily In each nostril, Disp: , Rfl:     atorvastatin (LIPITOR) 20 MG tablet, Take 1 tablet by mouth daily, Disp: , Rfl:     levothyroxine (SYNTHROID) 100 MCG tablet, Take 1 tablet by mouth Daily, Disp: , Rfl:     acetaminophen (TYLENOL) 325 MG tablet, Take 1 tablet by mouth 3 times daily, Disp: , Rfl:     amLODIPine (NORVASC) 10 MG tablet, Take 1 tablet by mouth daily, Disp: , Rfl:     allopurinol (ZYLOPRIM) 100 MG tablet, Take 1 tablet by mouth daily, Disp: , Rfl:     FLUoxetine (PROZAC) 40 MG capsule, Take 1 capsule by mouth daily Take with 20 mg capsuleto equal 60 mg daily, Disp: , Rfl:     docusate sodium (COLACE) 100 MG capsule, Take 1 capsule by mouth 1 CAPSULE EVERY MORNING AND 2 CAPSULES NIGHTLY, Disp: , Rfl:     diclofenac sodium (VOLTAREN) 1 % GEL, Apply 4 g topically 4 times daily as needed, Disp: , Rfl:   No Known Allergies    Past Medical History:   Diagnosis Date    Chronic kidney disease (CKD), stage III (moderate) (HCC)     Diabetes mellitus (HCC)     DJD (degenerative joint disease)     Facies suggestive of Down syndrome     Hearing impaired

## 2024-10-15 LAB
ALBUMIN: 3.8 G/DL (ref 3.5–5.2)
ALP BLD-CCNC: 112 U/L (ref 40–129)
ALT SERPL-CCNC: 23 U/L (ref 0–40)
ANION GAP SERPL CALCULATED.3IONS-SCNC: 15 MMOL/L (ref 7–16)
AST SERPL-CCNC: 27 U/L (ref 0–39)
BILIRUB SERPL-MCNC: 0.6 MG/DL (ref 0–1.2)
BUN BLDV-MCNC: 26 MG/DL (ref 6–23)
CALCIUM SERPL-MCNC: 9.3 MG/DL (ref 8.6–10.2)
CHLORIDE BLD-SCNC: 102 MMOL/L (ref 98–107)
CHOLESTEROL, TOTAL: 156 MG/DL
CO2: 22 MMOL/L (ref 22–29)
CREAT SERPL-MCNC: 1.3 MG/DL (ref 0.7–1.2)
CREATININE URINE: 54.4 MG/DL (ref 40–278)
GFR, ESTIMATED: 56 ML/MIN/1.73M2
GLUCOSE BLD-MCNC: 196 MG/DL (ref 74–99)
HDLC SERPL-MCNC: 54 MG/DL
LDL CHOLESTEROL: 82 MG/DL
POTASSIUM SERPL-SCNC: 4.9 MMOL/L (ref 3.5–5)
SODIUM BLD-SCNC: 139 MMOL/L (ref 132–146)
THYROXINE (T4): 6 UG/DL (ref 4.5–11.7)
TOTAL PROTEIN, URINE: 79 MG/DL (ref 0–12)
TOTAL PROTEIN: 6.7 G/DL (ref 6.4–8.3)
TRIGL SERPL-MCNC: 100 MG/DL
TSH SERPL DL<=0.05 MIU/L-ACNC: 4.1 UIU/ML (ref 0.27–4.2)
URIC ACID: 5.7 MG/DL (ref 3.4–7)
URINE TOTAL PROTEIN CREATININE RATIO: 1.46 (ref 0–0.2)
VLDLC SERPL CALC-MCNC: 20 MG/DL

## 2024-11-08 ENCOUNTER — APPOINTMENT (OUTPATIENT)
Dept: GENERAL RADIOLOGY | Age: 79
DRG: 872 | End: 2024-11-08
Payer: MEDICARE

## 2024-11-08 ENCOUNTER — APPOINTMENT (OUTPATIENT)
Dept: ULTRASOUND IMAGING | Age: 79
DRG: 872 | End: 2024-11-08
Payer: MEDICARE

## 2024-11-08 ENCOUNTER — HOSPITAL ENCOUNTER (INPATIENT)
Age: 79
LOS: 3 days | Discharge: HOME | DRG: 872 | End: 2024-11-11
Attending: EMERGENCY MEDICINE | Admitting: HOSPITALIST
Payer: MEDICARE

## 2024-11-08 ENCOUNTER — APPOINTMENT (OUTPATIENT)
Dept: CT IMAGING | Age: 79
DRG: 872 | End: 2024-11-08
Payer: MEDICARE

## 2024-11-08 DIAGNOSIS — D72.829 LEUKOCYTOSIS, UNSPECIFIED TYPE: ICD-10-CM

## 2024-11-08 DIAGNOSIS — L03.116 CELLULITIS OF LEFT LOWER EXTREMITY: Primary | ICD-10-CM

## 2024-11-08 DIAGNOSIS — R11.2 NAUSEA AND VOMITING, UNSPECIFIED VOMITING TYPE: ICD-10-CM

## 2024-11-08 PROBLEM — L03.90 CELLULITIS: Status: ACTIVE | Noted: 2024-11-08

## 2024-11-08 LAB
ALBUMIN SERPL-MCNC: 3.9 G/DL (ref 3.5–5.2)
ALP SERPL-CCNC: 128 U/L (ref 40–129)
ALT SERPL-CCNC: 41 U/L (ref 0–40)
ANION GAP SERPL CALCULATED.3IONS-SCNC: 11 MMOL/L (ref 7–16)
AST SERPL-CCNC: 32 U/L (ref 0–39)
B-OH-BUTYR SERPL-MCNC: 0.09 MMOL/L (ref 0.02–0.27)
BASOPHILS # BLD: 0.02 K/UL (ref 0–0.2)
BASOPHILS NFR BLD: 0 % (ref 0–2)
BILIRUB SERPL-MCNC: 0.8 MG/DL (ref 0–1.2)
BILIRUB UR QL STRIP: NEGATIVE
BNP SERPL-MCNC: 177 PG/ML (ref 0–450)
BUN SERPL-MCNC: 27 MG/DL (ref 6–23)
CALCIUM SERPL-MCNC: 9.4 MG/DL (ref 8.6–10.2)
CHLORIDE SERPL-SCNC: 102 MMOL/L (ref 98–107)
CLARITY UR: CLEAR
CO2 SERPL-SCNC: 23 MMOL/L (ref 22–29)
COLOR UR: YELLOW
CREAT SERPL-MCNC: 1.3 MG/DL (ref 0.7–1.2)
EOSINOPHIL # BLD: 0.01 K/UL (ref 0.05–0.5)
EOSINOPHILS RELATIVE PERCENT: 0 % (ref 0–6)
ERYTHROCYTE [DISTWIDTH] IN BLOOD BY AUTOMATED COUNT: 13.6 % (ref 11.5–15)
GFR, ESTIMATED: 56 ML/MIN/1.73M2
GLUCOSE SERPL-MCNC: 161 MG/DL (ref 74–99)
GLUCOSE UR STRIP-MCNC: NEGATIVE MG/DL
HCT VFR BLD AUTO: 44.1 % (ref 37–54)
HGB BLD-MCNC: 14.4 G/DL (ref 12.5–16.5)
HGB UR QL STRIP.AUTO: ABNORMAL
IMM GRANULOCYTES # BLD AUTO: 0.11 K/UL (ref 0–0.58)
IMM GRANULOCYTES NFR BLD: 1 % (ref 0–5)
KETONES UR STRIP-MCNC: NEGATIVE MG/DL
LACTATE BLDV-SCNC: 1.7 MMOL/L (ref 0.5–1.9)
LEUKOCYTE ESTERASE UR QL STRIP: NEGATIVE
LIPASE SERPL-CCNC: 25 U/L (ref 13–60)
LYMPHOCYTES NFR BLD: 0.41 K/UL (ref 1.5–4)
LYMPHOCYTES RELATIVE PERCENT: 2 % (ref 20–42)
MCH RBC QN AUTO: 32.3 PG (ref 26–35)
MCHC RBC AUTO-ENTMCNC: 32.7 G/DL (ref 32–34.5)
MCV RBC AUTO: 98.9 FL (ref 80–99.9)
MONOCYTES NFR BLD: 1.05 K/UL (ref 0.1–0.95)
MONOCYTES NFR BLD: 6 % (ref 2–12)
NEUTROPHILS NFR BLD: 91 % (ref 43–80)
NEUTS SEG NFR BLD: 16.72 K/UL (ref 1.8–7.3)
NITRITE UR QL STRIP: NEGATIVE
PH UR STRIP: 6 [PH] (ref 5–9)
PH VENOUS: 7.37 (ref 7.35–7.45)
PLATELET # BLD AUTO: 241 K/UL (ref 130–450)
PMV BLD AUTO: 8.9 FL (ref 7–12)
POTASSIUM SERPL-SCNC: 5 MMOL/L (ref 3.5–5)
PROT SERPL-MCNC: 6.9 G/DL (ref 6.4–8.3)
PROT UR STRIP-MCNC: 100 MG/DL
RBC # BLD AUTO: 4.46 M/UL (ref 3.8–5.8)
RBC # BLD: NORMAL 10*6/UL
RBC #/AREA URNS HPF: NORMAL /HPF
SARS-COV-2 RDRP RESP QL NAA+PROBE: NOT DETECTED
SODIUM SERPL-SCNC: 136 MMOL/L (ref 132–146)
SP GR UR STRIP: 1.02 (ref 1–1.03)
SPECIMEN DESCRIPTION: NORMAL
TROPONIN I SERPL HS-MCNC: 31 NG/L (ref 0–11)
TROPONIN I SERPL HS-MCNC: 32 NG/L (ref 0–11)
UROBILINOGEN UR STRIP-ACNC: 1 EU/DL (ref 0–1)
WBC #/AREA URNS HPF: NORMAL /HPF
WBC OTHER # BLD: 18.3 K/UL (ref 4.5–11.5)

## 2024-11-08 PROCEDURE — 85025 COMPLETE CBC W/AUTO DIFF WBC: CPT

## 2024-11-08 PROCEDURE — 87635 SARS-COV-2 COVID-19 AMP PRB: CPT

## 2024-11-08 PROCEDURE — 6360000004 HC RX CONTRAST MEDICATION: Performed by: RADIOLOGY

## 2024-11-08 PROCEDURE — 74177 CT ABD & PELVIS W/CONTRAST: CPT

## 2024-11-08 PROCEDURE — 83605 ASSAY OF LACTIC ACID: CPT

## 2024-11-08 PROCEDURE — 81001 URINALYSIS AUTO W/SCOPE: CPT

## 2024-11-08 PROCEDURE — 96361 HYDRATE IV INFUSION ADD-ON: CPT

## 2024-11-08 PROCEDURE — 2060000000 HC ICU INTERMEDIATE R&B

## 2024-11-08 PROCEDURE — 93005 ELECTROCARDIOGRAM TRACING: CPT | Performed by: EMERGENCY MEDICINE

## 2024-11-08 PROCEDURE — 71045 X-RAY EXAM CHEST 1 VIEW: CPT

## 2024-11-08 PROCEDURE — 84484 ASSAY OF TROPONIN QUANT: CPT

## 2024-11-08 PROCEDURE — 83690 ASSAY OF LIPASE: CPT

## 2024-11-08 PROCEDURE — 96375 TX/PRO/DX INJ NEW DRUG ADDON: CPT

## 2024-11-08 PROCEDURE — 82800 BLOOD PH: CPT

## 2024-11-08 PROCEDURE — 73590 X-RAY EXAM OF LOWER LEG: CPT

## 2024-11-08 PROCEDURE — 82010 KETONE BODYS QUAN: CPT

## 2024-11-08 PROCEDURE — APPSS60 APP SPLIT SHARED TIME 46-60 MINUTES: Performed by: NURSE PRACTITIONER

## 2024-11-08 PROCEDURE — 80053 COMPREHEN METABOLIC PANEL: CPT

## 2024-11-08 PROCEDURE — 93971 EXTREMITY STUDY: CPT

## 2024-11-08 PROCEDURE — 99285 EMERGENCY DEPT VISIT HI MDM: CPT

## 2024-11-08 PROCEDURE — 6360000002 HC RX W HCPCS: Performed by: EMERGENCY MEDICINE

## 2024-11-08 PROCEDURE — 71275 CT ANGIOGRAPHY CHEST: CPT

## 2024-11-08 PROCEDURE — 2580000003 HC RX 258: Performed by: EMERGENCY MEDICINE

## 2024-11-08 PROCEDURE — 87040 BLOOD CULTURE FOR BACTERIA: CPT

## 2024-11-08 PROCEDURE — 96365 THER/PROPH/DIAG IV INF INIT: CPT

## 2024-11-08 PROCEDURE — 83880 ASSAY OF NATRIURETIC PEPTIDE: CPT

## 2024-11-08 RX ORDER — POLYETHYLENE GLYCOL 3350 17 G/17G
17 POWDER, FOR SOLUTION ORAL DAILY PRN
Status: DISCONTINUED | OUTPATIENT
Start: 2024-11-08 | End: 2024-11-11 | Stop reason: HOSPADM

## 2024-11-08 RX ORDER — METOCLOPRAMIDE HYDROCHLORIDE 5 MG/ML
10 INJECTION INTRAMUSCULAR; INTRAVENOUS ONCE
Status: COMPLETED | OUTPATIENT
Start: 2024-11-08 | End: 2024-11-08

## 2024-11-08 RX ORDER — ONDANSETRON 2 MG/ML
4 INJECTION INTRAMUSCULAR; INTRAVENOUS ONCE
Status: COMPLETED | OUTPATIENT
Start: 2024-11-08 | End: 2024-11-08

## 2024-11-08 RX ORDER — ERGOCALCIFEROL 1.25 MG/1
50000 CAPSULE, LIQUID FILLED ORAL WEEKLY
Status: DISCONTINUED | OUTPATIENT
Start: 2024-11-08 | End: 2024-11-11 | Stop reason: HOSPADM

## 2024-11-08 RX ORDER — SODIUM CHLORIDE 9 MG/ML
INJECTION, SOLUTION INTRAVENOUS PRN
Status: DISCONTINUED | OUTPATIENT
Start: 2024-11-08 | End: 2024-11-11 | Stop reason: HOSPADM

## 2024-11-08 RX ORDER — ACETAMINOPHEN 325 MG/1
650 TABLET ORAL EVERY 6 HOURS PRN
Status: DISCONTINUED | OUTPATIENT
Start: 2024-11-08 | End: 2024-11-11 | Stop reason: HOSPADM

## 2024-11-08 RX ORDER — INSULIN LISPRO 100 [IU]/ML
0-4 INJECTION, SOLUTION INTRAVENOUS; SUBCUTANEOUS
Status: DISCONTINUED | OUTPATIENT
Start: 2024-11-08 | End: 2024-11-11 | Stop reason: HOSPADM

## 2024-11-08 RX ORDER — LEVOTHYROXINE SODIUM 100 UG/1
100 TABLET ORAL DAILY
Status: DISCONTINUED | OUTPATIENT
Start: 2024-11-09 | End: 2024-11-11 | Stop reason: HOSPADM

## 2024-11-08 RX ORDER — MAGNESIUM SULFATE IN WATER 40 MG/ML
2000 INJECTION, SOLUTION INTRAVENOUS PRN
Status: DISCONTINUED | OUTPATIENT
Start: 2024-11-08 | End: 2024-11-11 | Stop reason: HOSPADM

## 2024-11-08 RX ORDER — DOCUSATE SODIUM 100 MG/1
100 CAPSULE, LIQUID FILLED ORAL DAILY
Status: DISCONTINUED | OUTPATIENT
Start: 2024-11-09 | End: 2024-11-11 | Stop reason: HOSPADM

## 2024-11-08 RX ORDER — AZELASTINE HYDROCHLORIDE 137 UG/1
2 SPRAY, METERED NASAL DAILY
Status: DISCONTINUED | OUTPATIENT
Start: 2024-11-09 | End: 2024-11-08 | Stop reason: CLARIF

## 2024-11-08 RX ORDER — ENOXAPARIN SODIUM 100 MG/ML
30 INJECTION SUBCUTANEOUS 2 TIMES DAILY
Status: DISCONTINUED | OUTPATIENT
Start: 2024-11-08 | End: 2024-11-11 | Stop reason: HOSPADM

## 2024-11-08 RX ORDER — ATORVASTATIN CALCIUM 20 MG/1
20 TABLET, FILM COATED ORAL DAILY
Status: DISCONTINUED | OUTPATIENT
Start: 2024-11-09 | End: 2024-11-11 | Stop reason: HOSPADM

## 2024-11-08 RX ORDER — ALLOPURINOL 100 MG/1
100 TABLET ORAL DAILY
Status: DISCONTINUED | OUTPATIENT
Start: 2024-11-09 | End: 2024-11-11 | Stop reason: HOSPADM

## 2024-11-08 RX ORDER — POTASSIUM CHLORIDE 1500 MG/1
40 TABLET, EXTENDED RELEASE ORAL PRN
Status: DISCONTINUED | OUTPATIENT
Start: 2024-11-08 | End: 2024-11-11 | Stop reason: HOSPADM

## 2024-11-08 RX ORDER — ASCORBIC ACID 500 MG
500 TABLET ORAL DAILY
Status: DISCONTINUED | OUTPATIENT
Start: 2024-11-09 | End: 2024-11-11 | Stop reason: HOSPADM

## 2024-11-08 RX ORDER — IOPAMIDOL 755 MG/ML
75 INJECTION, SOLUTION INTRAVASCULAR
Status: COMPLETED | OUTPATIENT
Start: 2024-11-08 | End: 2024-11-08

## 2024-11-08 RX ORDER — SODIUM CHLORIDE 0.9 % (FLUSH) 0.9 %
5-40 SYRINGE (ML) INJECTION EVERY 12 HOURS SCHEDULED
Status: DISCONTINUED | OUTPATIENT
Start: 2024-11-08 | End: 2024-11-11 | Stop reason: HOSPADM

## 2024-11-08 RX ORDER — METOCLOPRAMIDE HYDROCHLORIDE 5 MG/ML
10 INJECTION INTRAMUSCULAR; INTRAVENOUS EVERY 6 HOURS PRN
Status: DISCONTINUED | OUTPATIENT
Start: 2024-11-08 | End: 2024-11-11 | Stop reason: SDUPTHER

## 2024-11-08 RX ORDER — TRAMADOL HYDROCHLORIDE 50 MG/1
50 TABLET ORAL EVERY 8 HOURS PRN
Status: DISCONTINUED | OUTPATIENT
Start: 2024-11-08 | End: 2024-11-11 | Stop reason: HOSPADM

## 2024-11-08 RX ORDER — VITAMIN E 268 MG
400 CAPSULE ORAL DAILY
Status: DISCONTINUED | OUTPATIENT
Start: 2024-11-09 | End: 2024-11-11 | Stop reason: HOSPADM

## 2024-11-08 RX ORDER — ONDANSETRON 4 MG/1
4 TABLET, ORALLY DISINTEGRATING ORAL EVERY 8 HOURS PRN
Status: DISCONTINUED | OUTPATIENT
Start: 2024-11-08 | End: 2024-11-11 | Stop reason: HOSPADM

## 2024-11-08 RX ORDER — AMLODIPINE BESYLATE 10 MG/1
10 TABLET ORAL DAILY
Status: DISCONTINUED | OUTPATIENT
Start: 2024-11-09 | End: 2024-11-11 | Stop reason: HOSPADM

## 2024-11-08 RX ORDER — ONDANSETRON 2 MG/ML
4 INJECTION INTRAMUSCULAR; INTRAVENOUS EVERY 6 HOURS PRN
Status: DISCONTINUED | OUTPATIENT
Start: 2024-11-08 | End: 2024-11-11 | Stop reason: HOSPADM

## 2024-11-08 RX ORDER — SODIUM CHLORIDE 0.9 % (FLUSH) 0.9 %
5-40 SYRINGE (ML) INJECTION PRN
Status: DISCONTINUED | OUTPATIENT
Start: 2024-11-08 | End: 2024-11-11 | Stop reason: HOSPADM

## 2024-11-08 RX ORDER — ACETAMINOPHEN 650 MG/1
650 SUPPOSITORY RECTAL EVERY 6 HOURS PRN
Status: DISCONTINUED | OUTPATIENT
Start: 2024-11-08 | End: 2024-11-11 | Stop reason: HOSPADM

## 2024-11-08 RX ORDER — 0.9 % SODIUM CHLORIDE 0.9 %
500 INTRAVENOUS SOLUTION INTRAVENOUS ONCE
Status: COMPLETED | OUTPATIENT
Start: 2024-11-08 | End: 2024-11-08

## 2024-11-08 RX ADMIN — SODIUM CHLORIDE 500 ML: 9 INJECTION, SOLUTION INTRAVENOUS at 18:57

## 2024-11-08 RX ADMIN — ONDANSETRON 4 MG: 2 INJECTION INTRAMUSCULAR; INTRAVENOUS at 18:14

## 2024-11-08 RX ADMIN — VANCOMYCIN HYDROCHLORIDE 2500 MG: 5 INJECTION, POWDER, LYOPHILIZED, FOR SOLUTION INTRAVENOUS at 22:31

## 2024-11-08 RX ADMIN — METOCLOPRAMIDE HYDROCHLORIDE 10 MG: 5 INJECTION INTRAMUSCULAR; INTRAVENOUS at 21:58

## 2024-11-08 RX ADMIN — IOPAMIDOL 75 ML: 755 INJECTION, SOLUTION INTRAVENOUS at 19:27

## 2024-11-08 RX ADMIN — CEFAZOLIN 2000 MG: 2 INJECTION, POWDER, FOR SOLUTION INTRAMUSCULAR; INTRAVENOUS at 22:14

## 2024-11-08 NOTE — ED PROVIDER NOTES
[facility] EMERGENCY DEPARTMENT  EMERGENCY DEPARTMENT ENCOUNTER        Pt Name: Irvin Hunt  MRN: [id number]  Birthdate 1945  Date of evaluation: 11/8/2024  Provider: Norma Tavarez MD  PCP: Latha Almaraz, DARREN - CNP  Note Started: 5:28 PM EST 11/8/24    CHIEF COMPLAINT       Chief Complaint   Patient presents with    Shortness of Breath    Chills    Wound Check     Left shin       HISTORY OF PRESENT ILLNESS: 1 or more Elements   History From: Patient; and group home staff member at bedside    Limitations to history : Patient has a history of developmental delay lives in group home; staff members here with him    Irvin Hunt is a 79 y.o. male who presents from group home with concerns of nausea and vomiting today.  Patient felt fine yesterday and today had 2 episodes of vomiting no blood in his vomitus he denies abdominal pain.  He also states he has some transient shortness of breath earlier today but no shortness of breath at this time no cough no fever no chest pain no pleuritic pain he is not anticoagulated.  He states he was scratched by his dog to the left anterior lower shin area 3 days ago and has now become warm and red and slightly more painful no purulent drainage.  Denies fevers or chills.  No known sick contacts.  No diarrhea normal bowel movements no urinary complaints.  Patient is not anticoagulated.  Denies headache.    Nursing Notes were all reviewed and agreed with or any disagreements were addressed in the HPI.      REVIEW OF EXTERNAL NOTE :       PDMP reviewed    REVIEW OF SYSTEMS :           Positives and Pertinent negatives as per HPI.     SURGICAL HISTORY     Past Surgical History:   Procedure Laterality Date    COLONOSCOPY      COLONOSCOPY N/A 8/11/2021    COLONOSCOPY DIAGNOSTIC performed by Mike Zurita DO at [facility] ENDOSCOPY       CURRENTMEDICATIONS       Previous Medications    ACETAMINOPHEN (TYLENOL) 325 MG TABLET    Take 1 tablet by mouth

## 2024-11-09 LAB
ALBUMIN SERPL-MCNC: 3.6 G/DL (ref 3.5–5.2)
ALP SERPL-CCNC: 127 U/L (ref 40–129)
ALT SERPL-CCNC: 40 U/L (ref 0–40)
ANION GAP SERPL CALCULATED.3IONS-SCNC: 13 MMOL/L (ref 7–16)
AST SERPL-CCNC: 35 U/L (ref 0–39)
BASOPHILS # BLD: 0.04 K/UL (ref 0–0.2)
BASOPHILS NFR BLD: 0 % (ref 0–2)
BILIRUB SERPL-MCNC: 1 MG/DL (ref 0–1.2)
BUN SERPL-MCNC: 29 MG/DL (ref 6–23)
CALCIUM SERPL-MCNC: 9 MG/DL (ref 8.6–10.2)
CHLORIDE SERPL-SCNC: 104 MMOL/L (ref 98–107)
CO2 SERPL-SCNC: 22 MMOL/L (ref 22–29)
CREAT SERPL-MCNC: 1.5 MG/DL (ref 0.7–1.2)
EOSINOPHIL # BLD: 0.04 K/UL (ref 0.05–0.5)
EOSINOPHILS RELATIVE PERCENT: 0 % (ref 0–6)
ERYTHROCYTE [DISTWIDTH] IN BLOOD BY AUTOMATED COUNT: 13.8 % (ref 11.5–15)
GFR, ESTIMATED: 47 ML/MIN/1.73M2
GLUCOSE BLD-MCNC: 149 MG/DL (ref 74–99)
GLUCOSE BLD-MCNC: 160 MG/DL (ref 74–99)
GLUCOSE BLD-MCNC: 165 MG/DL (ref 74–99)
GLUCOSE SERPL-MCNC: 151 MG/DL (ref 74–99)
HCT VFR BLD AUTO: 43.2 % (ref 37–54)
HGB BLD-MCNC: 13.8 G/DL (ref 12.5–16.5)
IMM GRANULOCYTES # BLD AUTO: 0.18 K/UL (ref 0–0.58)
IMM GRANULOCYTES NFR BLD: 1 % (ref 0–5)
LYMPHOCYTES NFR BLD: 0.69 K/UL (ref 1.5–4)
LYMPHOCYTES RELATIVE PERCENT: 3 % (ref 20–42)
MCH RBC QN AUTO: 32.5 PG (ref 26–35)
MCHC RBC AUTO-ENTMCNC: 31.9 G/DL (ref 32–34.5)
MCV RBC AUTO: 101.9 FL (ref 80–99.9)
MONOCYTES NFR BLD: 1.07 K/UL (ref 0.1–0.95)
MONOCYTES NFR BLD: 5 % (ref 2–12)
NEUTROPHILS NFR BLD: 91 % (ref 43–80)
NEUTS SEG NFR BLD: 20.4 K/UL (ref 1.8–7.3)
PLATELET # BLD AUTO: 234 K/UL (ref 130–450)
PMV BLD AUTO: 9.7 FL (ref 7–12)
POTASSIUM SERPL-SCNC: 4.9 MMOL/L (ref 3.5–5)
PROT SERPL-MCNC: 6.7 G/DL (ref 6.4–8.3)
RBC # BLD AUTO: 4.24 M/UL (ref 3.8–5.8)
RBC # BLD: NORMAL 10*6/UL
SODIUM SERPL-SCNC: 139 MMOL/L (ref 132–146)
WBC OTHER # BLD: 22.4 K/UL (ref 4.5–11.5)

## 2024-11-09 PROCEDURE — 6360000002 HC RX W HCPCS: Performed by: NURSE PRACTITIONER

## 2024-11-09 PROCEDURE — 2580000003 HC RX 258: Performed by: NURSE PRACTITIONER

## 2024-11-09 PROCEDURE — 6370000000 HC RX 637 (ALT 250 FOR IP): Performed by: STUDENT IN AN ORGANIZED HEALTH CARE EDUCATION/TRAINING PROGRAM

## 2024-11-09 PROCEDURE — 6370000000 HC RX 637 (ALT 250 FOR IP): Performed by: NURSE PRACTITIONER

## 2024-11-09 PROCEDURE — 82962 GLUCOSE BLOOD TEST: CPT

## 2024-11-09 PROCEDURE — 85025 COMPLETE CBC W/AUTO DIFF WBC: CPT

## 2024-11-09 PROCEDURE — 99232 SBSQ HOSP IP/OBS MODERATE 35: CPT | Performed by: STUDENT IN AN ORGANIZED HEALTH CARE EDUCATION/TRAINING PROGRAM

## 2024-11-09 PROCEDURE — 80053 COMPREHEN METABOLIC PANEL: CPT

## 2024-11-09 PROCEDURE — 2060000000 HC ICU INTERMEDIATE R&B

## 2024-11-09 RX ORDER — AMOXICILLIN AND CLAVULANATE POTASSIUM 562.5; 437.5; 62.5 MG/1; MG/1; MG/1
1 TABLET, MULTILAYER, EXTENDED RELEASE ORAL EVERY 12 HOURS SCHEDULED
Status: DISCONTINUED | OUTPATIENT
Start: 2024-11-09 | End: 2024-11-11 | Stop reason: HOSPADM

## 2024-11-09 RX ORDER — METOPROLOL SUCCINATE 25 MG/1
25 TABLET, EXTENDED RELEASE ORAL DAILY
COMMUNITY

## 2024-11-09 RX ORDER — FINERENONE 10 MG/1
10 TABLET, FILM COATED ORAL EVERY MORNING
COMMUNITY

## 2024-11-09 RX ORDER — METOPROLOL SUCCINATE 25 MG/1
25 TABLET, EXTENDED RELEASE ORAL DAILY
Status: DISCONTINUED | OUTPATIENT
Start: 2024-11-09 | End: 2024-11-11 | Stop reason: HOSPADM

## 2024-11-09 RX ADMIN — OXYCODONE HYDROCHLORIDE AND ACETAMINOPHEN 500 MG: 500 TABLET ORAL at 08:46

## 2024-11-09 RX ADMIN — SODIUM CHLORIDE, PRESERVATIVE FREE 10 ML: 5 INJECTION INTRAVENOUS at 01:50

## 2024-11-09 RX ADMIN — Medication 400 UNITS: at 08:47

## 2024-11-09 RX ADMIN — AMOXICILLIN AND CLAVULANATE POTASSIUM 1 TABLET: 562.5; 437.5; 62.5 TABLET, MULTILAYER, EXTENDED RELEASE ORAL at 12:32

## 2024-11-09 RX ADMIN — SODIUM CHLORIDE, PRESERVATIVE FREE 10 ML: 5 INJECTION INTRAVENOUS at 20:51

## 2024-11-09 RX ADMIN — AMLODIPINE BESYLATE 10 MG: 10 TABLET ORAL at 08:46

## 2024-11-09 RX ADMIN — ONDANSETRON 4 MG: 2 INJECTION INTRAMUSCULAR; INTRAVENOUS at 08:10

## 2024-11-09 RX ADMIN — SODIUM CHLORIDE, PRESERVATIVE FREE 10 ML: 5 INJECTION INTRAVENOUS at 08:10

## 2024-11-09 RX ADMIN — WATER 1000 MG: 1 INJECTION INTRAMUSCULAR; INTRAVENOUS; SUBCUTANEOUS at 06:43

## 2024-11-09 RX ADMIN — METOCLOPRAMIDE HYDROCHLORIDE 10 MG: 5 INJECTION INTRAMUSCULAR; INTRAVENOUS at 11:57

## 2024-11-09 RX ADMIN — DOCUSATE SODIUM 100 MG: 100 CAPSULE, LIQUID FILLED ORAL at 08:46

## 2024-11-09 RX ADMIN — ENOXAPARIN SODIUM 30 MG: 100 INJECTION SUBCUTANEOUS at 20:51

## 2024-11-09 RX ADMIN — ATORVASTATIN CALCIUM 20 MG: 20 TABLET, FILM COATED ORAL at 08:46

## 2024-11-09 RX ADMIN — ALLOPURINOL 100 MG: 100 TABLET ORAL at 08:47

## 2024-11-09 RX ADMIN — METOPROLOL SUCCINATE 25 MG: 25 TABLET, EXTENDED RELEASE ORAL at 08:46

## 2024-11-09 RX ADMIN — LEVOTHYROXINE SODIUM 100 MCG: 100 TABLET ORAL at 06:45

## 2024-11-09 RX ADMIN — ENOXAPARIN SODIUM 30 MG: 100 INJECTION SUBCUTANEOUS at 01:49

## 2024-11-09 RX ADMIN — FLUOXETINE HYDROCHLORIDE 60 MG: 20 CAPSULE ORAL at 08:46

## 2024-11-09 RX ADMIN — ENOXAPARIN SODIUM 30 MG: 100 INJECTION SUBCUTANEOUS at 08:47

## 2024-11-09 RX ADMIN — ONDANSETRON 4 MG: 2 INJECTION INTRAMUSCULAR; INTRAVENOUS at 17:07

## 2024-11-09 ASSESSMENT — PAIN - FUNCTIONAL ASSESSMENT: PAIN_FUNCTIONAL_ASSESSMENT: NONE - DENIES PAIN

## 2024-11-09 NOTE — ED NOTES
Sahra at [facility] should be contacted first for questions or if the patient is discharged: [phone]

## 2024-11-09 NOTE — H&P
[facility]      CHIEF COMPLAINT:  emesis, weak, pale, chills, and wound check    History of Present Illness:  This is a 79 year old male with history of MR, CKD stage III, DM type II, DJD, HLD, HTN, urinary incontinence, and hypothyroid.  Patient to ED due to emesis x 2, nausea, weakness, and chills at home.  Patient scratched by a dog 3 days ago and has a wound to left lower extremity.  Wound edematous, reddened, warm, and tender to touch.  Difficult to obtain HPI/ROS from patient due to MR.  Group home caregiver at bedside and reports patient had emesis x 2 with chills and weakness.  States that his vital signs were normal.  Denies any hematic emesis.  Denies recent illness, fever, shortness of breath, chest pain, abdominal pain, and changes in bowel/bladder habits.  Labs remarkable for troponin 31 and then 32, BUN/creatinine 27/1.3, GFR 56, and WBC 18.3.  EKG done showing sinus tachycardia, left axis deviation with left BBB.  CTA negative for PE.  CT of abdomen showing small-moderate stool burden.  X-ray done of left lower extremity showing no osteomyelitis, fatty atrophy of musculature, and mild diffuse subcutaneous emphysema.  ReSound of lower extremity negative for DVT.  Given Ancef, Reglan, Zofran, saline bolus, and vancomycin.  Will admit for further evaluation and treatment.    Informant(s) for H&P: Patient and group home care giver    REVIEW OF SYSTEMS:  A comprehensive review of systems was negative except for: what is in the HPI      PMH:  Past Medical History:   Diagnosis Date    Chronic kidney disease (CKD), stage III (moderate) (HCC)     Diabetes mellitus (HCC)     DJD (degenerative joint disease)     Facies suggestive of Down syndrome     Hearing impaired     Hyperlipidemia     Hypertension     Hypothyroid     Mental deficiency     Urge urinary incontinence        Surgical History:  Past Surgical History:   Procedure Laterality Date    COLONOSCOPY

## 2024-11-09 NOTE — DISCHARGE INSTR - COC
Continuity of Care Form    Patient Name: Irvin Hunt   :  1945  MRN:  [id number]    Admit date:  2024  Discharge date:  24    Code Status Order: Prior   Advance Directives:   Advance Care Flowsheet Documentation             Admitting Physician:  Sandor Odom DO  PCP: Latha Almaraz, APRN - CNP    Discharging Nurse: BENNY Miller  Discharging Hospital Unit/Room#:   Discharging Unit Phone Number:     Emergency Contact:   Extended Emergency Contact Information  Primary Emergency Contact: [facility]  Home Phone: [phone]  Relation: Other  Secondary Emergency Contact: Gibran Madison  Home Phone: [phone]  Relation: Other    Past Surgical History:  Past Surgical History:   Procedure Laterality Date    COLONOSCOPY      COLONOSCOPY N/A 2021    COLONOSCOPY DIAGNOSTIC performed by Mike Zurita DO at [facility] ENDOSCOPY       Immunization History:   Immunization History   Administered Date(s) Administered    COVID-19, MODERNA BLUE border, Primary or Immunocompromised, (age 12y+), IM, 100 mcg/0.5mL 2021, 2021, 2021, 2021    COVID-19, MODERNA Booster BLUE border, (age 18y+), IM, 50mcg/0.25mL 2021    DTaP 2016    Hep B, ENGERIX-B, (age 20y+), IM, 1mL 2016    Hep B, RECOMBIVAX-HB, (age 20y+), IM, 1mL 2016    Influenza A (J0R6-34) Vaccine PF IM 2009, 2009    Influenza Virus Vaccine 2017, 2019, 2019, 2019, 2020, 2021    Influenza, AFLURIA (age 3 y+), FLUZONE, (age 6 mo+), Quadv MDV, 0.5mL 2017, 2019, 10/03/2019    Influenza, FLUAD, (age 65 y+), IM, Quadv, 0.5mL 10/04/2022    Influenza, FLUZONE High Dose, (age 65 y+), IM, Trivalent PF, 0.5mL 2019    Pneumococcal, PCV-13, PREVNAR 13, (age 6w+), IM, 0.5mL 2016    Pneumococcal, PPSV23, PNEUMOVAX 23, (age 2y+), SC/IM, 0.5mL 2017    Zoster Live (Zostavax) 2016    Zoster Recombinant (Shingrix) 10/28/2021,

## 2024-11-09 NOTE — ED NOTES
ED to Inpatient Handoff Report    Notified Helena that electronic handoff available and patient ready for transport to room 403A.    Safety Risks: Memory Problems, Hearing problems, Home safety issues, Difficulty with daily activities, and Risk of falls    Patient in Restraints: no    Constant Observer or Patient : no    Telemetry Monitoring Ordered : No           Order to transfer to unit without monitor:N/A      Deterioration Index Score:   Predictive Model Details          37 (Caution)  Factor Value    Calculated 11/9/2024 00:32 33% Age 79 years old    Deterioration Index Model 31% Mesa coma scale 13     12% Potassium 5.0 mmol/L     10% WBC count abnormal (18.3 k/uL)     6% Respiratory rate 18     4% Systolic 140     2% BUN abnormal (27 mg/dL)     2% Sodium 136 mmol/L     0% Pulse oximetry 98 %     0% Pulse 80     0% Temperature 98 °F (36.7 °C)     0% Hematocrit 44.1 %     0% Blood pH 7.370        Vitals:    11/08/24 2130 11/08/24 2300 11/09/24 0000 11/09/24 0030   BP: 122/64 128/60 (!) 140/70    Pulse: 86 82 80    Resp: 20 20 18    Temp: 98.3 °F (36.8 °C) 98.3 °F (36.8 °C) 98 °F (36.7 °C)    TempSrc: Oral Oral Oral    SpO2: 100% 98% 98%    Weight:    128.8 kg (283 lb 15.2 oz)   Height:             Opportunity for questions and clarification was provided during telephone report.

## 2024-11-09 NOTE — PLAN OF CARE
Problem: Skin/Tissue Integrity  Goal: Absence of new skin breakdown  Description: 1.  Monitor for areas of redness and/or skin breakdown  2.  Assess vascular access sites hourly  3.  Every 4-6 hours minimum:  Change oxygen saturation probe site  4.  Every 4-6 hours:  If on nasal continuous positive airway pressure, respiratory therapy assess nares and determine need for appliance change or resting period.  Outcome: Progressing     Problem: Safety - Adult  Goal: Free from fall injury  11/9/2024 0829 by Avery Brown, RN  Outcome: Progressing  11/9/2024 0129 by Helena Montano, RN  Outcome: Progressing     Problem: Chronic Conditions and Co-morbidities  Goal: Patient's chronic conditions and co-morbidity symptoms are monitored and maintained or improved  11/9/2024 0829 by Avery Brown, RN  Outcome: Progressing  11/9/2024 0129 by Helena Montano, RN  Outcome: Progressing     Problem: Discharge Planning  Goal: Discharge to home or other facility with appropriate resources  Outcome: Progressing     Problem: ABCDS Injury Assessment  Goal: Absence of physical injury  Outcome: Progressing

## 2024-11-10 LAB
ALBUMIN SERPL-MCNC: 3.1 G/DL (ref 3.5–5.2)
ALP SERPL-CCNC: 101 U/L (ref 40–129)
ALT SERPL-CCNC: 22 U/L (ref 0–40)
ANION GAP SERPL CALCULATED.3IONS-SCNC: 10 MMOL/L (ref 7–16)
ANION GAP SERPL CALCULATED.3IONS-SCNC: 9 MMOL/L (ref 7–16)
AST SERPL-CCNC: 28 U/L (ref 0–39)
BASOPHILS # BLD: 0.04 K/UL (ref 0–0.2)
BASOPHILS # BLD: 0.05 K/UL (ref 0–0.2)
BASOPHILS NFR BLD: 0 % (ref 0–2)
BASOPHILS NFR BLD: 0 % (ref 0–2)
BILIRUB SERPL-MCNC: 1.2 MG/DL (ref 0–1.2)
BUN SERPL-MCNC: 39 MG/DL (ref 6–23)
BUN SERPL-MCNC: 39 MG/DL (ref 6–23)
CALCIUM SERPL-MCNC: 8.8 MG/DL (ref 8.6–10.2)
CALCIUM SERPL-MCNC: 8.8 MG/DL (ref 8.6–10.2)
CHLORIDE SERPL-SCNC: 102 MMOL/L (ref 98–107)
CHLORIDE SERPL-SCNC: 104 MMOL/L (ref 98–107)
CO2 SERPL-SCNC: 22 MMOL/L (ref 22–29)
CO2 SERPL-SCNC: 24 MMOL/L (ref 22–29)
CREAT SERPL-MCNC: 1.5 MG/DL (ref 0.7–1.2)
CREAT SERPL-MCNC: 1.7 MG/DL (ref 0.7–1.2)
EOSINOPHIL # BLD: 0 K/UL (ref 0.05–0.5)
EOSINOPHIL # BLD: 0 K/UL (ref 0.05–0.5)
EOSINOPHILS RELATIVE PERCENT: 0 % (ref 0–6)
EOSINOPHILS RELATIVE PERCENT: 0 % (ref 0–6)
ERYTHROCYTE [DISTWIDTH] IN BLOOD BY AUTOMATED COUNT: 13.9 % (ref 11.5–15)
ERYTHROCYTE [DISTWIDTH] IN BLOOD BY AUTOMATED COUNT: 13.9 % (ref 11.5–15)
GFR, ESTIMATED: 42 ML/MIN/1.73M2
GFR, ESTIMATED: 48 ML/MIN/1.73M2
GLUCOSE BLD-MCNC: 103 MG/DL (ref 74–99)
GLUCOSE BLD-MCNC: 112 MG/DL (ref 74–99)
GLUCOSE BLD-MCNC: 143 MG/DL (ref 74–99)
GLUCOSE BLD-MCNC: 96 MG/DL (ref 74–99)
GLUCOSE SERPL-MCNC: 114 MG/DL (ref 74–99)
GLUCOSE SERPL-MCNC: 139 MG/DL (ref 74–99)
HCT VFR BLD AUTO: 41 % (ref 37–54)
HCT VFR BLD AUTO: 41.5 % (ref 37–54)
HGB BLD-MCNC: 13.1 G/DL (ref 12.5–16.5)
HGB BLD-MCNC: 13.4 G/DL (ref 12.5–16.5)
IMM GRANULOCYTES # BLD AUTO: 0.26 K/UL (ref 0–0.58)
IMM GRANULOCYTES # BLD AUTO: 0.3 K/UL (ref 0–0.58)
IMM GRANULOCYTES NFR BLD: 1 % (ref 0–5)
IMM GRANULOCYTES NFR BLD: 1 % (ref 0–5)
LYMPHOCYTES NFR BLD: 0.95 K/UL (ref 1.5–4)
LYMPHOCYTES NFR BLD: 1.45 K/UL (ref 1.5–4)
LYMPHOCYTES RELATIVE PERCENT: 4 % (ref 20–42)
LYMPHOCYTES RELATIVE PERCENT: 6 % (ref 20–42)
MCH RBC QN AUTO: 32 PG (ref 26–35)
MCH RBC QN AUTO: 32.6 PG (ref 26–35)
MCHC RBC AUTO-ENTMCNC: 32 G/DL (ref 32–34.5)
MCHC RBC AUTO-ENTMCNC: 32.3 G/DL (ref 32–34.5)
MCV RBC AUTO: 100 FL (ref 80–99.9)
MCV RBC AUTO: 101 FL (ref 80–99.9)
MONOCYTES NFR BLD: 1.98 K/UL (ref 0.1–0.95)
MONOCYTES NFR BLD: 10 % (ref 2–12)
MONOCYTES NFR BLD: 2.25 K/UL (ref 0.1–0.95)
MONOCYTES NFR BLD: 9 % (ref 2–12)
NEUTROPHILS NFR BLD: 82 % (ref 43–80)
NEUTROPHILS NFR BLD: 85 % (ref 43–80)
NEUTS SEG NFR BLD: 18.63 K/UL (ref 1.8–7.3)
NEUTS SEG NFR BLD: 18.79 K/UL (ref 1.8–7.3)
PLATELET # BLD AUTO: 202 K/UL (ref 130–450)
PLATELET # BLD AUTO: 203 K/UL (ref 130–450)
PMV BLD AUTO: 9.5 FL (ref 7–12)
PMV BLD AUTO: 9.6 FL (ref 7–12)
POTASSIUM SERPL-SCNC: 4.5 MMOL/L (ref 3.5–5)
POTASSIUM SERPL-SCNC: 4.7 MMOL/L (ref 3.5–5)
PROT SERPL-MCNC: 6.3 G/DL (ref 6.4–8.3)
RBC # BLD AUTO: 4.1 M/UL (ref 3.8–5.8)
RBC # BLD AUTO: 4.11 M/UL (ref 3.8–5.8)
RBC # BLD: ABNORMAL 10*6/UL
SODIUM SERPL-SCNC: 135 MMOL/L (ref 132–146)
SODIUM SERPL-SCNC: 136 MMOL/L (ref 132–146)
VANCOMYCIN TROUGH SERPL-MCNC: 9.8 UG/ML (ref 5–16)
WBC OTHER # BLD: 22.1 K/UL (ref 4.5–11.5)
WBC OTHER # BLD: 22.6 K/UL (ref 4.5–11.5)

## 2024-11-10 PROCEDURE — 80048 BASIC METABOLIC PNL TOTAL CA: CPT

## 2024-11-10 PROCEDURE — 2580000003 HC RX 258: Performed by: NURSE PRACTITIONER

## 2024-11-10 PROCEDURE — 6370000000 HC RX 637 (ALT 250 FOR IP): Performed by: NURSE PRACTITIONER

## 2024-11-10 PROCEDURE — 6360000002 HC RX W HCPCS: Performed by: NURSE PRACTITIONER

## 2024-11-10 PROCEDURE — 85025 COMPLETE CBC W/AUTO DIFF WBC: CPT

## 2024-11-10 PROCEDURE — 80053 COMPREHEN METABOLIC PANEL: CPT

## 2024-11-10 PROCEDURE — 99232 SBSQ HOSP IP/OBS MODERATE 35: CPT | Performed by: STUDENT IN AN ORGANIZED HEALTH CARE EDUCATION/TRAINING PROGRAM

## 2024-11-10 PROCEDURE — 2500000003 HC RX 250 WO HCPCS: Performed by: STUDENT IN AN ORGANIZED HEALTH CARE EDUCATION/TRAINING PROGRAM

## 2024-11-10 PROCEDURE — 6370000000 HC RX 637 (ALT 250 FOR IP): Performed by: STUDENT IN AN ORGANIZED HEALTH CARE EDUCATION/TRAINING PROGRAM

## 2024-11-10 PROCEDURE — 2060000000 HC ICU INTERMEDIATE R&B

## 2024-11-10 PROCEDURE — 80202 ASSAY OF VANCOMYCIN: CPT

## 2024-11-10 PROCEDURE — 82962 GLUCOSE BLOOD TEST: CPT

## 2024-11-10 RX ADMIN — OXYCODONE HYDROCHLORIDE AND ACETAMINOPHEN 500 MG: 500 TABLET ORAL at 08:34

## 2024-11-10 RX ADMIN — AMOXICILLIN AND CLAVULANATE POTASSIUM 1 TABLET: 562.5; 437.5; 62.5 TABLET, MULTILAYER, EXTENDED RELEASE ORAL at 08:32

## 2024-11-10 RX ADMIN — METOPROLOL SUCCINATE 25 MG: 25 TABLET, EXTENDED RELEASE ORAL at 08:33

## 2024-11-10 RX ADMIN — MICONAZOLE NITRATE: 20 POWDER TOPICAL at 13:00

## 2024-11-10 RX ADMIN — LEVOTHYROXINE SODIUM 100 MCG: 100 TABLET ORAL at 05:05

## 2024-11-10 RX ADMIN — ALLOPURINOL 100 MG: 100 TABLET ORAL at 08:32

## 2024-11-10 RX ADMIN — SODIUM CHLORIDE, PRESERVATIVE FREE 10 ML: 5 INJECTION INTRAVENOUS at 08:35

## 2024-11-10 RX ADMIN — ATORVASTATIN CALCIUM 20 MG: 20 TABLET, FILM COATED ORAL at 08:33

## 2024-11-10 RX ADMIN — ENOXAPARIN SODIUM 30 MG: 100 INJECTION SUBCUTANEOUS at 08:29

## 2024-11-10 RX ADMIN — ENOXAPARIN SODIUM 30 MG: 100 INJECTION SUBCUTANEOUS at 21:26

## 2024-11-10 RX ADMIN — AMLODIPINE BESYLATE 10 MG: 10 TABLET ORAL at 08:33

## 2024-11-10 RX ADMIN — Medication 400 UNITS: at 08:32

## 2024-11-10 RX ADMIN — FLUOXETINE HYDROCHLORIDE 60 MG: 20 CAPSULE ORAL at 08:32

## 2024-11-10 RX ADMIN — MICONAZOLE NITRATE: 20 POWDER TOPICAL at 21:27

## 2024-11-10 RX ADMIN — AMOXICILLIN AND CLAVULANATE POTASSIUM 1 TABLET: 562.5; 437.5; 62.5 TABLET, MULTILAYER, EXTENDED RELEASE ORAL at 21:26

## 2024-11-10 RX ADMIN — DOCUSATE SODIUM 100 MG: 100 CAPSULE, LIQUID FILLED ORAL at 08:33

## 2024-11-10 NOTE — PLAN OF CARE
Problem: Skin/Tissue Integrity  Goal: Absence of new skin breakdown  Description: 1.  Monitor for areas of redness and/or skin breakdown  2.  Assess vascular access sites hourly  3.  Every 4-6 hours minimum:  Change oxygen saturation probe site  4.  Every 4-6 hours:  If on nasal continuous positive airway pressure, respiratory therapy assess nares and determine need for appliance change or resting period.  Outcome: Progressing     Problem: Safety - Adult  Goal: Free from fall injury  Outcome: Progressing     Problem: Chronic Conditions and Co-morbidities  Goal: Patient's chronic conditions and co-morbidity symptoms are monitored and maintained or improved  Outcome: Progressing     Problem: Discharge Planning  Goal: Discharge to home or other facility with appropriate resources  Outcome: Progressing     Problem: ABCDS Injury Assessment  Goal: Absence of physical injury  Outcome: Progressing

## 2024-11-11 ENCOUNTER — TELEPHONE (OUTPATIENT)
Dept: FAMILY MEDICINE CLINIC | Age: 79
End: 2024-11-11

## 2024-11-11 VITALS
WEIGHT: 283.73 LBS | OXYGEN SATURATION: 95 % | HEIGHT: 65 IN | DIASTOLIC BLOOD PRESSURE: 78 MMHG | SYSTOLIC BLOOD PRESSURE: 161 MMHG | RESPIRATION RATE: 20 BRPM | TEMPERATURE: 97.8 F | BODY MASS INDEX: 47.27 KG/M2 | HEART RATE: 94 BPM

## 2024-11-11 LAB
ALBUMIN SERPL-MCNC: 2.8 G/DL (ref 3.5–5.2)
ALP SERPL-CCNC: 95 U/L (ref 40–129)
ALT SERPL-CCNC: 17 U/L (ref 0–40)
ANION GAP SERPL CALCULATED.3IONS-SCNC: 11 MMOL/L (ref 7–16)
AST SERPL-CCNC: 27 U/L (ref 0–39)
BASOPHILS # BLD: 0 K/UL (ref 0–0.2)
BASOPHILS NFR BLD: 0 % (ref 0–2)
BILIRUB SERPL-MCNC: 1.2 MG/DL (ref 0–1.2)
BUN SERPL-MCNC: 41 MG/DL (ref 6–23)
CALCIUM SERPL-MCNC: 9 MG/DL (ref 8.6–10.2)
CHLORIDE SERPL-SCNC: 105 MMOL/L (ref 98–107)
CO2 SERPL-SCNC: 20 MMOL/L (ref 22–29)
CREAT SERPL-MCNC: 1.5 MG/DL (ref 0.7–1.2)
EKG ATRIAL RATE: 111 BPM
EKG P AXIS: 27 DEGREES
EKG P-R INTERVAL: 168 MS
EKG Q-T INTERVAL: 354 MS
EKG QRS DURATION: 130 MS
EKG QTC CALCULATION (BAZETT): 481 MS
EKG R AXIS: -60 DEGREES
EKG T AXIS: 73 DEGREES
EKG VENTRICULAR RATE: 111 BPM
EOSINOPHIL # BLD: 0 K/UL (ref 0.05–0.5)
EOSINOPHILS RELATIVE PERCENT: 0 % (ref 0–6)
ERYTHROCYTE [DISTWIDTH] IN BLOOD BY AUTOMATED COUNT: 13.8 % (ref 11.5–15)
GFR, ESTIMATED: 48 ML/MIN/1.73M2
GLUCOSE BLD-MCNC: 98 MG/DL (ref 74–99)
GLUCOSE SERPL-MCNC: 95 MG/DL (ref 74–99)
HCT VFR BLD AUTO: 40.8 % (ref 37–54)
HGB BLD-MCNC: 13.3 G/DL (ref 12.5–16.5)
LYMPHOCYTES NFR BLD: 2.52 K/UL (ref 1.5–4)
LYMPHOCYTES RELATIVE PERCENT: 12 % (ref 20–42)
MCH RBC QN AUTO: 32.3 PG (ref 26–35)
MCHC RBC AUTO-ENTMCNC: 32.6 G/DL (ref 32–34.5)
MCV RBC AUTO: 99 FL (ref 80–99.9)
MONOCYTES NFR BLD: 1.08 K/UL (ref 0.1–0.95)
MONOCYTES NFR BLD: 5 % (ref 2–12)
NEUTROPHILS NFR BLD: 83 % (ref 43–80)
NEUTS SEG NFR BLD: 17.1 K/UL (ref 1.8–7.3)
PLATELET # BLD AUTO: 213 K/UL (ref 130–450)
PMV BLD AUTO: 9.8 FL (ref 7–12)
POTASSIUM SERPL-SCNC: 4.4 MMOL/L (ref 3.5–5)
PROT SERPL-MCNC: 6.4 G/DL (ref 6.4–8.3)
RBC # BLD AUTO: 4.12 M/UL (ref 3.8–5.8)
RBC # BLD: NORMAL 10*6/UL
SODIUM SERPL-SCNC: 136 MMOL/L (ref 132–146)
WBC OTHER # BLD: 20.7 K/UL (ref 4.5–11.5)

## 2024-11-11 PROCEDURE — 93010 ELECTROCARDIOGRAM REPORT: CPT | Performed by: INTERNAL MEDICINE

## 2024-11-11 PROCEDURE — 80053 COMPREHEN METABOLIC PANEL: CPT

## 2024-11-11 PROCEDURE — 85025 COMPLETE CBC W/AUTO DIFF WBC: CPT

## 2024-11-11 PROCEDURE — 6360000002 HC RX W HCPCS: Performed by: NURSE PRACTITIONER

## 2024-11-11 PROCEDURE — 6370000000 HC RX 637 (ALT 250 FOR IP): Performed by: NURSE PRACTITIONER

## 2024-11-11 PROCEDURE — 2580000003 HC RX 258: Performed by: NURSE PRACTITIONER

## 2024-11-11 PROCEDURE — 99239 HOSP IP/OBS DSCHRG MGMT >30: CPT | Performed by: STUDENT IN AN ORGANIZED HEALTH CARE EDUCATION/TRAINING PROGRAM

## 2024-11-11 PROCEDURE — 6370000000 HC RX 637 (ALT 250 FOR IP): Performed by: STUDENT IN AN ORGANIZED HEALTH CARE EDUCATION/TRAINING PROGRAM

## 2024-11-11 PROCEDURE — 92610 EVALUATE SWALLOWING FUNCTION: CPT

## 2024-11-11 PROCEDURE — 82962 GLUCOSE BLOOD TEST: CPT

## 2024-11-11 RX ORDER — METOCLOPRAMIDE HYDROCHLORIDE 5 MG/ML
5 INJECTION INTRAMUSCULAR; INTRAVENOUS EVERY 6 HOURS PRN
Status: DISCONTINUED | OUTPATIENT
Start: 2024-11-11 | End: 2024-11-11 | Stop reason: HOSPADM

## 2024-11-11 RX ORDER — AMOXICILLIN AND CLAVULANATE POTASSIUM 562.5; 437.5; 62.5 MG/1; MG/1; MG/1
1 TABLET, MULTILAYER, EXTENDED RELEASE ORAL EVERY 12 HOURS SCHEDULED
Qty: 10 TABLET | Refills: 0 | Status: SHIPPED | OUTPATIENT
Start: 2024-11-11 | End: 2024-11-15

## 2024-11-11 RX ADMIN — AMOXICILLIN AND CLAVULANATE POTASSIUM 1 TABLET: 562.5; 437.5; 62.5 TABLET, MULTILAYER, EXTENDED RELEASE ORAL at 09:15

## 2024-11-11 RX ADMIN — DOCUSATE SODIUM 100 MG: 100 CAPSULE, LIQUID FILLED ORAL at 09:15

## 2024-11-11 RX ADMIN — METOPROLOL SUCCINATE 25 MG: 25 TABLET, EXTENDED RELEASE ORAL at 09:15

## 2024-11-11 RX ADMIN — SODIUM CHLORIDE, PRESERVATIVE FREE 10 ML: 5 INJECTION INTRAVENOUS at 09:16

## 2024-11-11 RX ADMIN — FLUOXETINE HYDROCHLORIDE 60 MG: 20 CAPSULE ORAL at 09:15

## 2024-11-11 RX ADMIN — ENOXAPARIN SODIUM 30 MG: 100 INJECTION SUBCUTANEOUS at 09:14

## 2024-11-11 RX ADMIN — ALLOPURINOL 100 MG: 100 TABLET ORAL at 09:15

## 2024-11-11 RX ADMIN — MICONAZOLE NITRATE: 20 POWDER TOPICAL at 09:14

## 2024-11-11 RX ADMIN — OXYCODONE HYDROCHLORIDE AND ACETAMINOPHEN 500 MG: 500 TABLET ORAL at 09:15

## 2024-11-11 RX ADMIN — AMLODIPINE BESYLATE 10 MG: 10 TABLET ORAL at 09:15

## 2024-11-11 RX ADMIN — ATORVASTATIN CALCIUM 20 MG: 20 TABLET, FILM COATED ORAL at 09:15

## 2024-11-11 RX ADMIN — Medication 400 UNITS: at 09:15

## 2024-11-11 RX ADMIN — LEVOTHYROXINE SODIUM 100 MCG: 100 TABLET ORAL at 06:25

## 2024-11-11 NOTE — PLAN OF CARE
Problem: Skin/Tissue Integrity  Goal: Absence of new skin breakdown  Description: 1.  Monitor for areas of redness and/or skin breakdown  2.  Assess vascular access sites hourly  3.  Every 4-6 hours minimum:  Change oxygen saturation probe site  4.  Every 4-6 hours:  If on nasal continuous positive airway pressure, respiratory therapy assess nares and determine need for appliance change or resting period.  11/10/2024 2224 by Lisa Zurita RN  Outcome: Progressing  11/10/2024 1329 by Michela Daigle RN  Outcome: Progressing     Problem: Safety - Adult  Goal: Free from fall injury  11/10/2024 2224 by Lisa Zurita RN  Outcome: Progressing  11/10/2024 1329 by Michela Daigle RN  Outcome: Progressing     Problem: Chronic Conditions and Co-morbidities  Goal: Patient's chronic conditions and co-morbidity symptoms are monitored and maintained or improved  11/10/2024 2224 by Lisa Zurita RN  Outcome: Progressing  11/10/2024 1329 by Michela Daigle RN  Outcome: Progressing     Problem: Discharge Planning  Goal: Discharge to home or other facility with appropriate resources  11/10/2024 2224 by Lisa Zurita RN  Outcome: Progressing  11/10/2024 1329 by Michela Daigle RN  Outcome: Progressing     Problem: ABCDS Injury Assessment  Goal: Absence of physical injury  11/10/2024 1329 by Michela Dagile RN  Outcome: Progressing

## 2024-11-11 NOTE — DISCHARGE SUMMARY
extremity.  The left posterior tibial, peroneal and anterior tibial veins are not visualized.       Patient Instructions:      Medication List        START taking these medications      amoxicillin-clavulanate 1000-62.5 MG per extended release tablet  Commonly known as: AUGMENTIN XR  Take 1 tablet by mouth every 12 hours for 10 doses            CHANGE how you take these medications      menthol-zinc oxide 0.44-20.625 % Oint ointment  Commonly known as: CALMOSEPTINE  Apply 2 times daily to affected area. Max 30 ml per day.  What changed:   when to take this  reasons to take this            CONTINUE taking these medications      acetaminophen 325 MG tablet  Commonly known as: TYLENOL     Alcohol Prep 70 % Pads     allopurinol 100 MG tablet  Commonly known as: ZYLOPRIM     amLODIPine 10 MG tablet  Commonly known as: NORVASC     aspirin 325 MG EC tablet     atorvastatin 20 MG tablet  Commonly known as: LIPITOR     Azelastine HCl 137 MCG/SPRAY Soln     Banophen 25 MG capsule  Generic drug: diphenhydrAMINE     blood glucose test strips  Test 2 times a day & as needed for symptoms of irregular blood glucose. Dispense sufficient amount for indicated testing frequency plus additional to accommodate PRN testing needs.     Chest Congestion Relief 100 MG/5ML liquid  Generic drug: guaiFENesin     clobetasol 0.05 % ointment  Commonly known as: Temovate  Apply topically 2 times daily.     CPAP Machine Misc     diclofenac sodium 1 % Gel  Commonly known as: VOLTAREN     docusate sodium 100 MG capsule  Commonly known as: COLACE     * FLUoxetine 40 MG capsule  Commonly known as: PROzac     * FLUoxetine 20 MG capsule  Commonly known as: PROZAC     Gemtesa 75 MG Tabs tablet  Generic drug: vibegron     glucose monitoring kit  1 kit by Does not apply route daily     ibuprofen 200 MG tablet  Commonly known as: ADVIL;MOTRIN     Kerendia 10 MG Tabs  Generic drug: Finerenone     Lancets Misc  1 each by Does not apply route 4 times daily

## 2024-11-11 NOTE — PROGRESS NOTES
[facility] Hospitalist Progress Note    Admitting Date and Time: 11/8/2024  4:44 PM  Admit Dx: Cellulitis [L03.90]  Cellulitis of left lower extremity [L03.116]  Leukocytosis, unspecified type [D72.829]  Nausea and vomiting, unspecified vomiting type [R11.2]    Subjective:  Patient is being followed for Cellulitis [L03.90]  Cellulitis of left lower extremity [L03.116]  Leukocytosis, unspecified type [D72.829]  Nausea and vomiting, unspecified vomiting type [R11.2]   Pt feels okay  Per RN: no additional concerns    ROS: denies fever, chills, cp, sob, n/v, HA unless otherwise noted above     metoprolol succinate  25 mg Oral Daily    Finerenone  10 mg Oral QAM    amoxicillin-clavulanate  1 tablet Oral 2 times per day    sodium chloride flush  5-40 mL IntraVENous 2 times per day    enoxaparin  30 mg SubCUTAneous BID    allopurinol  100 mg Oral Daily    amLODIPine  10 mg Oral Daily    atorvastatin  20 mg Oral Daily    docusate sodium  100 mg Oral Daily    levothyroxine  100 mcg Oral Daily    vitamin C  500 mg Oral Daily    vitamin D  50,000 Units Oral Weekly    vitamin E  400 Units Oral Daily    FLUoxetine  60 mg Oral Daily    insulin lispro  0-4 Units SubCUTAneous 4x Daily AC & HS     sodium chloride flush, 5-40 mL, PRN  sodium chloride, , PRN  potassium chloride, 40 mEq, PRN   Or  potassium alternative oral replacement, 40 mEq, PRN  magnesium sulfate, 2,000 mg, PRN  ondansetron, 4 mg, Q8H PRN   Or  ondansetron, 4 mg, Q6H PRN  polyethylene glycol, 17 g, Daily PRN  acetaminophen, 650 mg, Q6H PRN   Or  acetaminophen, 650 mg, Q6H PRN  traMADol, 50 mg, Q8H PRN  metoclopramide, 10 mg, Q6H PRN         Objective:  /86   Pulse 98   Temp 98.6 °F (37 °C) (Oral)   Resp 16   Ht 1.651 m (5' 5\")   Wt 128.7 kg (283 lb 11.7 oz)   SpO2 96%   BMI 47.22 kg/m²     General Appearance: pleasant male, alert and oriented to person, somewhat to place, in NAD, sitting in bed  Skin: warm and dry, darkening of erythema to 
       [facility] Hospitalist Progress Note    Admitting Date and Time: 11/8/2024  4:44 PM  Admit Dx: Cellulitis [L03.90]  Cellulitis of left lower extremity [L03.116]  Leukocytosis, unspecified type [D72.829]  Nausea and vomiting, unspecified vomiting type [R11.2]    Subjective:  Patient is being followed for Cellulitis [L03.90]  Cellulitis of left lower extremity [L03.116]  Leukocytosis, unspecified type [D72.829]  Nausea and vomiting, unspecified vomiting type [R11.2]   Pt feels okay  Per RN: no additional concerns    ROS: denies fever, chills, cp, sob, n/v, HA unless otherwise noted above     metoprolol succinate  25 mg Oral Daily    Finerenone  10 mg Oral QAM    ceFAZolin  1,000 mg IntraVENous Q8H    vancomycin  1,000 mg IntraVENous Q18H    sodium chloride flush  5-40 mL IntraVENous 2 times per day    enoxaparin  30 mg SubCUTAneous BID    allopurinol  100 mg Oral Daily    amLODIPine  10 mg Oral Daily    atorvastatin  20 mg Oral Daily    docusate sodium  100 mg Oral Daily    levothyroxine  100 mcg Oral Daily    vitamin C  500 mg Oral Daily    vitamin D  50,000 Units Oral Weekly    vitamin E  400 Units Oral Daily    FLUoxetine  60 mg Oral Daily    insulin lispro  0-4 Units SubCUTAneous 4x Daily AC & HS     sodium chloride flush, 5-40 mL, PRN  sodium chloride, , PRN  potassium chloride, 40 mEq, PRN   Or  potassium alternative oral replacement, 40 mEq, PRN  magnesium sulfate, 2,000 mg, PRN  ondansetron, 4 mg, Q8H PRN   Or  ondansetron, 4 mg, Q6H PRN  polyethylene glycol, 17 g, Daily PRN  acetaminophen, 650 mg, Q6H PRN   Or  acetaminophen, 650 mg, Q6H PRN  traMADol, 50 mg, Q8H PRN  metoclopramide, 10 mg, Q6H PRN         Objective:  /70   Pulse 98   Temp 99.4 °F (37.4 °C) (Oral)   Resp 18   Ht 1.651 m (5' 5\")   Wt 128.7 kg (283 lb 11.7 oz)   SpO2 98%   BMI 47.22 kg/m²     General Appearance: pleasant male, alert and oriented to person, somewhat to place, in NAD, sitting in bed  Skin: warm and dry, 
4 Eyes Skin Assessment     NAME:  Irvin Hunt  YOB: 1945  MEDICAL RECORD NUMBER:  [id number]    The patient is being assessed for  Admission    I agree that at least one RN has performed a thorough Head to Toe Skin Assessment on the patient. ALL assessment sites listed below have been assessed.      Areas assessed by both nurses:    Head, Face, Ears, Shoulders, Back, Chest, Arms, Elbows, Hands, Sacrum. Buttock, Coccyx, Ischium, Legs. Feet and Heels, and Under Medical Devices         Does the Patient have a Wound? No noted wound(s)     Patient has swelling/redness with scabbing on left shin. Patient also has redness on penis.    Tuan Prevention initiated by RN: Yes  Wound Care Orders initiated by RN: No    Pressure Injury (Stage 3,4, Unstageable, DTI, NWPT, and Complex wounds) if present, place Wound referral order by RN under : No    New Ostomies, if present place, Ostomy referral order under : No     Nurse 1 eSignature: Electronically signed by Helena Montano RN on 11/9/24 at 3:14 AM EST    **SHARE this note so that the co-signing nurse can place an eSignature**    Nurse 2 eSignature: Electronically signed by Koby Perez RN on 11/9/24 at 3:20 AM EST    
Pharmacy Consultation Note  (Antibiotic Dosing and Monitoring)    Initial consult date: 11/9  Consulting physician/provider: Harjit  Drug: Vancomycin  Indication: Skin and soft tissue x 7 days    Age/  Gender Height Weight IBW  Allergy Information   79 y.o./male 165.1 cm (5' 5\") 117.9 kg (260 lb)     Ideal body weight: 61.5 kg (135 lb 9.3 oz)  Adjusted ideal body weight: 88.4 kg (194 lb 13.5 oz)   Patient has no known allergies.      Renal Function:  Recent Labs     11/08/24  1805 11/09/24  0252   BUN 27* 29*   CREATININE 1.3* 1.5*       Intake/Output Summary (Last 24 hours) at 11/9/2024 0853  Last data filed at 11/9/2024 0441  Gross per 24 hour   Intake 1000 ml   Output 300 ml   Net 700 ml       Vancomycin Monitoring:  Trough:  No results for input(s): \"VANCOTROUGH\" in the last 72 hours.  Random:  No results for input(s): \"VANCORANDOM\" in the last 72 hours.    Vancomycin Administration Times:  Recent vancomycin administrations                     vancomycin (VANCOCIN) 2,500 mg in sodium chloride 0.9 % 500 mL IVPB (mg) 2,500 mg New Bag 11/08/24 2231                    Assessment:  Patient is a 79 y.o. male who has been initiated on vancomycin  Estimated Creatinine Clearance: 51 mL/min (A) (based on SCr of 1.5 mg/dL (H)).  No history of vancomycin levels in EPIC  Patient received vancomycin 2500 mg IV x 1 on 11/8 at 2231  Serum creatinine increased to 1.5 mg/dL today, up from 1.3 mg/dL on 11/8    Plan:  Will place standing order for vancomycin 1000 mg IV q18h to begin today at 1400 for projected AUC/COLETTE 400-600  Given increasing serum creatinine, will order vancomycin trough level at 0700 on 11/10.  PLEASE HOLD VANCOMYCIN IF VANCOMYCIN LEVEL IS GREATER THAN 20 MCG/ML.  Will assess vancomycin level on 11/10 to determine future vancomycin dosing plans.   Will continue to monitor renal function   Pharmacy to follow      Benito Hua PharmD 11/9/2024 8:53 AM   Ext: [phone]  IDANIA: [id number]  SEY: [phone]  SJW: [phone]   
Pharmacy Note    This patient was ordered azelastine (Astelin). Per the Pharmacy & Therapeutics Committee, this medication is non-formulary and not stocked by pharmacy for the reason indicated below. The medication can be reordered at discharge.     Medications in which risks outweigh benefits during hospitalization:           -  oral bisphosphonates         -  raloxifene (Evista)        -  SGLT2 inhibitors (ordered in the hospital for an indication other than heart failure or chronic kidney disease)    Medications that lack necessity during an acute hospital stay:        -  nasal antihistamines        -  nasal ipratropium 0.03% and 0.06%        -  nasal miacalcin        -  acyclovir topical cream/ointment orders for herpes labialis (cold sores)    Zheng Peters, PharmD  11/08/24 11:38 PM   
Received call from group home staff, checking on pt's progress, they voiced concern over new gagging episodes pt is having since Friday 11/8, Dr Nye notified, will get speech eval  
Renal Dose Adjustment Policy (Generic)     This patient is on medication that requires renal, weight, and/or indication dose adjustment.      Date Body Weight IBW  Adjusted BW SCr  CrCl Dialysis status   11/11/2024 128.7 kg (283 lb 11.7 oz) Ideal body weight: 61.5 kg (135 lb 9.3 oz)  Adjusted ideal body weight: 88.4 kg (194 lb 13.5 oz) Serum creatinine: 1.5 mg/dL (H) 11/11/24 0239  Estimated creatinine clearance: 51 mL/min (A) N/a       Pharmacy has dose-adjusted the following medication(s):    Date Previous Order Adjusted Order   11/11/2024 Metoclopramide 10 mg IV every 6 hours prn Metoclopramide 5 mg IV   every 6 hours prn       These changes were made per protocol according to the [facility]   Automatic Renal Dose Adjustment Policy.     *Please note this dose may need readjusted if patient's condition changes.    Please contact pharmacy with any questions regarding these changes.    brenton frausto RPH  11/11/2024  6:22 AM    
murmur, unspecified    Hypertensive renal disease    Vitamin D deficiency    Type 2 diabetes mellitus with stage 3b chronic kidney disease, without long-term current use of insulin (HCC)    Hx of colonic polyps    Chronic renal disease, stage III (HCC) [281529]    Cellulitis         CPT code:  01673  bedside swallow edgar Marin M.S. CCC-SLP/L  Speech Language Pathologist  [phone]

## 2024-11-11 NOTE — TELEPHONE ENCOUNTER
On 11/11/24 Zana Cerda called to make a follow up appointment for Patient. He is getting out today.

## 2024-11-11 NOTE — PLAN OF CARE
Problem: Skin/Tissue Integrity  Goal: Absence of new skin breakdown  11/11/2024 1105 by Angela Ochoa RN  Outcome: Progressing  11/10/2024 2224 by Lisa Zurita RN  Outcome: Progressing     Problem: Safety - Adult  Goal: Free from fall injury  11/11/2024 1105 by Angela Ochoa RN  Outcome: Progressing  11/10/2024 2224 by Lisa Zurita RN  Outcome: Progressing     Problem: Chronic Conditions and Co-morbidities  Goal: Patient's chronic conditions and co-morbidity symptoms are monitored and maintained or improved  11/11/2024 1105 by Angela Ochoa RN  Outcome: Progressing  11/10/2024 2224 by Lisa Zurita RN  Outcome: Progressing     Problem: Discharge Planning  Goal: Discharge to home or other facility with appropriate resources  11/11/2024 1105 by Agnela Ochoa RN  Outcome: Progressing  11/10/2024 2224 by Lisa Zurita RN  Outcome: Progressing     Problem: ABCDS Injury Assessment  Goal: Absence of physical injury  Outcome: Progressing     Problem: Skin/Tissue Integrity - Adult  Goal: Incisions, wounds, or drain sites healing without S/S of infection  Outcome: Progressing

## 2024-11-11 NOTE — CARE COORDINATION
Introduced my self and provided explanation of CM role to patient.  Patient is alert, very [place].  States he is from [facility] where he uses a w/c and receives some assistance with adl's.  He anticipates a return to same.  Spoke with Sahra the facility house nurse who confirmed the above and states she can provide transport back to facility at time of discharge.  This has been scheduled for 2 PM today as primary care has placed discharge order.  Patient is established with a pcp and denies any issue with retail pharmaceutical coverage.  Patient verbalizes no concerns and identifies no areas to focus on nor barriers to discharge.   Hang Gomez, MSN RN  [facility] Case Management  [phone]

## 2024-11-11 NOTE — TELEPHONE ENCOUNTER
Care Transitions Initial Follow Up Call    Outreach made within 2 business days of discharge: Yes    Patient: Irvin Hunt Patient : 1945   MRN: [id number]  Reason for Admission: Cellulitis   Discharge Date: 24       Spoke with: [facility]     Discharge department/facility: [facility] Interactive Patient Contact:  Was patient able to fill all prescriptions: Yes  Was patient instructed to bring all medications to the follow-up visit: Yes  Is patient taking all medications as directed in the discharge summary? Yes  Does patient understand their discharge instructions: Yes  Does patient have questions or concerns that need addressed prior to 7-14 day follow up office visit: no    Additional needs identified to be addressed with provider  No needs identified             Scheduled appointment with PCP within 7-14 days    Follow Up  Future Appointments   Date Time Provider Department Center   2024 10:30 AM Latha Almaraz APRN - CNP E. PAL [facility]   1/15/2025  8:45 AM Rufina, Latha L, APRN - CNP E. PAL [facility] DEP       Robyn Delgado MA

## 2024-11-11 NOTE — ACP (ADVANCE CARE PLANNING)
Advance Care Planning   Healthcare Decision Maker:    Primary Decision Maker: GriceldaShaunGibran - [facility] - [phone]    Click here to complete Healthcare Decision Makers including selection of the Healthcare Decision Maker Relationship (ie \"Primary\").

## 2024-11-12 ENCOUNTER — OFFICE VISIT (OUTPATIENT)
Dept: FAMILY MEDICINE CLINIC | Age: 79
End: 2024-11-12

## 2024-11-12 VITALS
SYSTOLIC BLOOD PRESSURE: 135 MMHG | BODY MASS INDEX: 45.82 KG/M2 | WEIGHT: 275 LBS | OXYGEN SATURATION: 96 % | TEMPERATURE: 98 F | HEART RATE: 86 BPM | DIASTOLIC BLOOD PRESSURE: 85 MMHG | RESPIRATION RATE: 18 BRPM | HEIGHT: 65 IN

## 2024-11-12 DIAGNOSIS — N18.32 TYPE 2 DIABETES MELLITUS WITH STAGE 3B CHRONIC KIDNEY DISEASE, WITHOUT LONG-TERM CURRENT USE OF INSULIN (HCC): ICD-10-CM

## 2024-11-12 DIAGNOSIS — E66.01 CLASS 3 SEVERE OBESITY DUE TO EXCESS CALORIES WITH SERIOUS COMORBIDITY AND BODY MASS INDEX (BMI) OF 45.0 TO 49.9 IN ADULT: ICD-10-CM

## 2024-11-12 DIAGNOSIS — Z09 HOSPITAL DISCHARGE FOLLOW-UP: ICD-10-CM

## 2024-11-12 DIAGNOSIS — R39.81 URINARY INCONTINENCE DUE TO COGNITIVE IMPAIRMENT: ICD-10-CM

## 2024-11-12 DIAGNOSIS — E11.22 TYPE 2 DIABETES MELLITUS WITH STAGE 3B CHRONIC KIDNEY DISEASE, WITHOUT LONG-TERM CURRENT USE OF INSULIN (HCC): ICD-10-CM

## 2024-11-12 DIAGNOSIS — E78.5 HYPERLIPIDEMIA, UNSPECIFIED HYPERLIPIDEMIA TYPE: ICD-10-CM

## 2024-11-12 DIAGNOSIS — R15.9 FULL INCONTINENCE-FECES: ICD-10-CM

## 2024-11-12 DIAGNOSIS — Q90.9: ICD-10-CM

## 2024-11-12 DIAGNOSIS — F79 MENTAL DEFICIENCY: ICD-10-CM

## 2024-11-12 DIAGNOSIS — I10 ESSENTIAL HYPERTENSION, MALIGNANT: ICD-10-CM

## 2024-11-12 DIAGNOSIS — R01.1 CARDIAC MURMUR, UNSPECIFIED: ICD-10-CM

## 2024-11-12 DIAGNOSIS — L03.116 CELLULITIS OF LEFT LEG: Primary | ICD-10-CM

## 2024-11-12 DIAGNOSIS — E66.813 CLASS 3 SEVERE OBESITY DUE TO EXCESS CALORIES WITH SERIOUS COMORBIDITY AND BODY MASS INDEX (BMI) OF 45.0 TO 49.9 IN ADULT: ICD-10-CM

## 2024-11-12 DIAGNOSIS — R26.81 UNSTEADY GAIT: ICD-10-CM

## 2024-11-12 DIAGNOSIS — N18.32 STAGE 3B CHRONIC KIDNEY DISEASE (HCC): ICD-10-CM

## 2024-11-12 DIAGNOSIS — I12.9 HYPERTENSIVE RENAL DISEASE: ICD-10-CM

## 2024-11-12 DIAGNOSIS — M15.0 PRIMARY OSTEOARTHRITIS INVOLVING MULTIPLE JOINTS: ICD-10-CM

## 2024-11-12 RX ORDER — DOXYCYCLINE HYCLATE 100 MG
100 TABLET ORAL 2 TIMES DAILY
Qty: 20 TABLET | Refills: 0 | Status: SHIPPED | OUTPATIENT
Start: 2024-11-12 | End: 2024-11-22

## 2024-11-12 RX ORDER — FINERENONE 10 MG/1
10 TABLET, FILM COATED ORAL EVERY MORNING
COMMUNITY
Start: 2024-10-31 | End: 2024-11-12

## 2024-11-12 RX ORDER — METOPROLOL SUCCINATE 25 MG/1
25 TABLET, EXTENDED RELEASE ORAL NIGHTLY
COMMUNITY
Start: 2024-10-30 | End: 2024-11-12

## 2024-11-12 ASSESSMENT — ENCOUNTER SYMPTOMS
ABDOMINAL PAIN: 0
ANAL BLEEDING: 0
COLOR CHANGE: 1
APNEA: 0
CHOKING: 0
BLOOD IN STOOL: 0
SHORTNESS OF BREATH: 0
SORE THROAT: 0
BACK PAIN: 0
STRIDOR: 0
ABDOMINAL DISTENTION: 0
EYE REDNESS: 0
FACIAL SWELLING: 0
DIARRHEA: 0
COUGH: 0
SINUS PAIN: 0
NAUSEA: 0
VOMITING: 0
RECTAL PAIN: 0
SINUS PRESSURE: 0
EYE ITCHING: 0
CONSTIPATION: 0
CHEST TIGHTNESS: 0
EYE DISCHARGE: 0
TROUBLE SWALLOWING: 0
WHEEZING: 0
VOICE CHANGE: 0
PHOTOPHOBIA: 0
EYE PAIN: 0
RHINORRHEA: 0

## 2024-11-12 NOTE — PROGRESS NOTES
for Congestion      ibuprofen (ADVIL;MOTRIN) 200 MG tablet every 8 hours as needed      loperamide (IMODIUM) 2 MG capsule 4 times daily as needed for Diarrhea      simethicone (MYLICON) 80 MG chewable tablet every 6 hours as needed      vitamin E 180 MG (400 UNIT) CAPS capsule as needed      Misc. Devices (CPAP MACHINE) MISC by Does not apply route      menthol-zinc oxide (CALMOSEPTINE) 0.44-20.625 % OINT ointment Apply 2 times daily to affected area. Max 30 ml per day. (Patient taking differently: 2 times daily as needed Apply 2 times daily to affected area. Max 30 ml per day.) 1 each 5    GEMTESA 75 MG TABS tablet       FLUoxetine (PROZAC) 20 MG capsule Take 1 capsule by mouth daily      glucose monitoring (FREESTYLE FREEDOM) kit 1 kit by Does not apply route daily 1 kit 0    blood glucose monitor strips Test 2 times a day & as needed for symptoms of irregular blood glucose. Dispense sufficient amount for indicated testing frequency plus additional to accommodate PRN testing needs. 100 strip 5    Lancets MISC 1 each by Does not apply route 4 times daily 600 each 1    Azelastine HCl 137 MCG/SPRAY SOLN 2 sprays by Nasal route daily In each nostril      atorvastatin (LIPITOR) 20 MG tablet Take 1 tablet by mouth daily      levothyroxine (SYNTHROID) 100 MCG tablet Take 1 tablet by mouth Daily      acetaminophen (TYLENOL) 325 MG tablet Take 1 tablet by mouth 3 times daily      amLODIPine (NORVASC) 10 MG tablet Take 1 tablet by mouth daily      allopurinol (ZYLOPRIM) 100 MG tablet Take 1 tablet by mouth daily      FLUoxetine (PROZAC) 40 MG capsule Take 1 capsule by mouth daily Take with 20 mg capsuleto equal 60 mg daily      docusate sodium (COLACE) 100 MG capsule Take 1 capsule by mouth 1 CAPSULE EVERY MORNING AND 2 CAPSULES NIGHTLY      diclofenac sodium (VOLTAREN) 1 % GEL Apply 4 g topically 4 times daily as needed          Medications patient taking as of now reconciled against medications ordered at time of hospital

## 2024-11-13 LAB
MICROORGANISM SPEC CULT: NORMAL
MICROORGANISM SPEC CULT: NORMAL
SERVICE CMNT-IMP: NORMAL
SERVICE CMNT-IMP: NORMAL
SPECIMEN DESCRIPTION: NORMAL
SPECIMEN DESCRIPTION: NORMAL

## 2024-11-15 ENCOUNTER — OFFICE VISIT (OUTPATIENT)
Dept: FAMILY MEDICINE CLINIC | Age: 79
End: 2024-11-15

## 2024-11-15 VITALS
HEIGHT: 65 IN | TEMPERATURE: 97.1 F | HEART RATE: 67 BPM | RESPIRATION RATE: 18 BRPM | SYSTOLIC BLOOD PRESSURE: 132 MMHG | DIASTOLIC BLOOD PRESSURE: 68 MMHG | BODY MASS INDEX: 47.65 KG/M2 | OXYGEN SATURATION: 100 % | WEIGHT: 286 LBS

## 2024-11-15 DIAGNOSIS — L03.116 CELLULITIS OF LEFT LEG: ICD-10-CM

## 2024-11-15 DIAGNOSIS — R13.10 DYSPHAGIA, UNSPECIFIED TYPE: Primary | ICD-10-CM

## 2024-11-15 DIAGNOSIS — E66.01 CLASS 3 SEVERE OBESITY DUE TO EXCESS CALORIES WITH SERIOUS COMORBIDITY AND BODY MASS INDEX (BMI) OF 45.0 TO 49.9 IN ADULT: ICD-10-CM

## 2024-11-15 DIAGNOSIS — F79 MENTAL DEFICIENCY: ICD-10-CM

## 2024-11-15 DIAGNOSIS — N18.32 STAGE 3B CHRONIC KIDNEY DISEASE (HCC): ICD-10-CM

## 2024-11-15 DIAGNOSIS — R01.1 CARDIAC MURMUR, UNSPECIFIED: ICD-10-CM

## 2024-11-15 DIAGNOSIS — E11.22 TYPE 2 DIABETES MELLITUS WITH STAGE 3B CHRONIC KIDNEY DISEASE, WITHOUT LONG-TERM CURRENT USE OF INSULIN (HCC): ICD-10-CM

## 2024-11-15 DIAGNOSIS — N18.32 TYPE 2 DIABETES MELLITUS WITH STAGE 3B CHRONIC KIDNEY DISEASE, WITHOUT LONG-TERM CURRENT USE OF INSULIN (HCC): ICD-10-CM

## 2024-11-15 DIAGNOSIS — M15.0 PRIMARY OSTEOARTHRITIS INVOLVING MULTIPLE JOINTS: ICD-10-CM

## 2024-11-15 DIAGNOSIS — E66.813 CLASS 3 SEVERE OBESITY DUE TO EXCESS CALORIES WITH SERIOUS COMORBIDITY AND BODY MASS INDEX (BMI) OF 45.0 TO 49.9 IN ADULT: ICD-10-CM

## 2024-11-15 ASSESSMENT — ENCOUNTER SYMPTOMS
NAUSEA: 0
ABDOMINAL PAIN: 0
VOMITING: 0
CHEST TIGHTNESS: 0
CONSTIPATION: 0
APNEA: 0
COUGH: 0
EYE PAIN: 0
WHEEZING: 0
SORE THROAT: 0
EYE REDNESS: 0
BLOOD IN STOOL: 0
ANAL BLEEDING: 0
TROUBLE SWALLOWING: 0
CHOKING: 0
VOICE CHANGE: 0
STRIDOR: 0
RECTAL PAIN: 0
DIARRHEA: 0
COLOR CHANGE: 1
EYE ITCHING: 0
SINUS PRESSURE: 0
ABDOMINAL DISTENTION: 0
BACK PAIN: 0
SHORTNESS OF BREATH: 0
RHINORRHEA: 0
EYE DISCHARGE: 0
SINUS PAIN: 0
FACIAL SWELLING: 0
PHOTOPHOBIA: 0

## 2024-11-15 NOTE — PROGRESS NOTES
11/15/24  Irvin Hunt : 1945 Sex: male  Age: 79 y.o.    Chief Complaint   Patient presents with    Other     Recheck for left leg cellulitis       Patient is here today to assess his left leg cellulitis.  He had been on Augmentin and the leg had been worsening so we switched him over to doxycycline on Tuesday.  It looks like the leg is a little improved since that time.  He denies any pain to the leg.  It is still swollen but the redness is lightening.        Review of Systems   Constitutional:  Negative for activity change, appetite change, chills, diaphoresis, fatigue, fever and unexpected weight change.   HENT:  Negative for congestion, dental problem, drooling, ear discharge, ear pain, facial swelling, hearing loss, mouth sores, nosebleeds, postnasal drip, rhinorrhea, sinus pressure, sinus pain, sneezing, sore throat, tinnitus, trouble swallowing and voice change.    Eyes:  Negative for photophobia, pain, discharge, redness, itching and visual disturbance.   Respiratory:  Negative for apnea, cough, choking, chest tightness, shortness of breath, wheezing and stridor.    Cardiovascular:  Positive for leg swelling. Negative for chest pain and palpitations.   Gastrointestinal:  Negative for abdominal distention, abdominal pain, anal bleeding, blood in stool, constipation, diarrhea, nausea, rectal pain and vomiting.   Endocrine: Negative for cold intolerance, heat intolerance, polydipsia, polyphagia and polyuria.   Genitourinary:  Negative for decreased urine volume, difficulty urinating, dysuria, enuresis, flank pain, frequency, genital sores, hematuria and urgency.   Musculoskeletal:  Negative for arthralgias, back pain, gait problem, joint swelling, myalgias, neck pain and neck stiffness.   Skin:  Positive for color change. Negative for pallor, rash and wound.   Allergic/Immunologic: Negative for environmental allergies, food allergies and immunocompromised state.   Neurological:  Negative for

## 2024-11-18 ENCOUNTER — TELEPHONE (OUTPATIENT)
Dept: FAMILY MEDICINE CLINIC | Age: 79
End: 2024-11-18

## 2024-11-22 ENCOUNTER — OFFICE VISIT (OUTPATIENT)
Dept: FAMILY MEDICINE CLINIC | Age: 79
End: 2024-11-22
Payer: MEDICARE

## 2024-11-22 VITALS
RESPIRATION RATE: 16 BRPM | OXYGEN SATURATION: 99 % | HEART RATE: 78 BPM | HEIGHT: 65 IN | SYSTOLIC BLOOD PRESSURE: 122 MMHG | DIASTOLIC BLOOD PRESSURE: 78 MMHG | WEIGHT: 286 LBS | BODY MASS INDEX: 47.65 KG/M2 | TEMPERATURE: 97.7 F

## 2024-11-22 DIAGNOSIS — N18.32 STAGE 3B CHRONIC KIDNEY DISEASE (HCC): ICD-10-CM

## 2024-11-22 DIAGNOSIS — E66.01 CLASS 3 SEVERE OBESITY DUE TO EXCESS CALORIES WITH SERIOUS COMORBIDITY AND BODY MASS INDEX (BMI) OF 45.0 TO 49.9 IN ADULT: ICD-10-CM

## 2024-11-22 DIAGNOSIS — L03.116 CELLULITIS OF LEFT LEG: Primary | ICD-10-CM

## 2024-11-22 DIAGNOSIS — E66.813 CLASS 3 SEVERE OBESITY DUE TO EXCESS CALORIES WITH SERIOUS COMORBIDITY AND BODY MASS INDEX (BMI) OF 45.0 TO 49.9 IN ADULT: ICD-10-CM

## 2024-11-22 DIAGNOSIS — N18.32 TYPE 2 DIABETES MELLITUS WITH STAGE 3B CHRONIC KIDNEY DISEASE, WITHOUT LONG-TERM CURRENT USE OF INSULIN (HCC): ICD-10-CM

## 2024-11-22 DIAGNOSIS — R01.1 CARDIAC MURMUR, UNSPECIFIED: ICD-10-CM

## 2024-11-22 DIAGNOSIS — F79 MENTAL DEFICIENCY: ICD-10-CM

## 2024-11-22 DIAGNOSIS — E11.22 TYPE 2 DIABETES MELLITUS WITH STAGE 3B CHRONIC KIDNEY DISEASE, WITHOUT LONG-TERM CURRENT USE OF INSULIN (HCC): ICD-10-CM

## 2024-11-22 PROCEDURE — 1036F TOBACCO NON-USER: CPT | Performed by: NURSE PRACTITIONER

## 2024-11-22 PROCEDURE — 1123F ACP DISCUSS/DSCN MKR DOCD: CPT | Performed by: NURSE PRACTITIONER

## 2024-11-22 PROCEDURE — 3044F HG A1C LEVEL LT 7.0%: CPT | Performed by: NURSE PRACTITIONER

## 2024-11-22 PROCEDURE — G8427 DOCREV CUR MEDS BY ELIG CLIN: HCPCS | Performed by: NURSE PRACTITIONER

## 2024-11-22 PROCEDURE — 99213 OFFICE O/P EST LOW 20 MIN: CPT | Performed by: NURSE PRACTITIONER

## 2024-11-22 PROCEDURE — 1111F DSCHRG MED/CURRENT MED MERGE: CPT | Performed by: NURSE PRACTITIONER

## 2024-11-22 PROCEDURE — G8484 FLU IMMUNIZE NO ADMIN: HCPCS | Performed by: NURSE PRACTITIONER

## 2024-11-22 PROCEDURE — 1159F MED LIST DOCD IN RCRD: CPT | Performed by: NURSE PRACTITIONER

## 2024-11-22 PROCEDURE — G8417 CALC BMI ABV UP PARAM F/U: HCPCS | Performed by: NURSE PRACTITIONER

## 2024-11-22 ASSESSMENT — ENCOUNTER SYMPTOMS
SORE THROAT: 0
FACIAL SWELLING: 0
EYE REDNESS: 0
COUGH: 0
ABDOMINAL DISTENTION: 0
NAUSEA: 0
EYE PAIN: 0
APNEA: 0
CHOKING: 0
STRIDOR: 0
VOICE CHANGE: 0
ANAL BLEEDING: 0
BLOOD IN STOOL: 0
TROUBLE SWALLOWING: 0
COLOR CHANGE: 0
WHEEZING: 0
SHORTNESS OF BREATH: 0
ABDOMINAL PAIN: 0
DIARRHEA: 0
RHINORRHEA: 0
CONSTIPATION: 0
CHEST TIGHTNESS: 0
RECTAL PAIN: 0
EYE ITCHING: 0
SINUS PAIN: 0
SINUS PRESSURE: 0
BACK PAIN: 0
PHOTOPHOBIA: 0
EYE DISCHARGE: 0
VOMITING: 0

## 2024-11-22 NOTE — PROGRESS NOTES
Diagnosis    Facies suggestive of Down syndrome    Hypothyroid    Obesity    DJD (degenerative joint disease)    Mental deficiency    Unspecified abnormalities of gait and mobility    Cardiac murmur, unspecified    Hypertensive renal disease    Vitamin D deficiency    Type 2 diabetes mellitus with stage 3b chronic kidney disease, without long-term current use of insulin (HCC)    Hx of colonic polyps    Chronic renal disease, stage III (HCC) [491275]    Cellulitis        Vitals:    11/22/24 1208   BP: 122/78   Pulse: 78   Resp: 16   Temp: 97.7 °F (36.5 °C)   TempSrc: Infrared   SpO2: 99%   Weight: 129.7 kg (286 lb)   Height: 1.651 m (5' 5\")       Physical Exam  Vitals and nursing note reviewed.   Constitutional:       General: He is not in acute distress.     Appearance: Normal appearance. He is obese. He is not ill-appearing, toxic-appearing or diaphoretic.   HENT:      Head: Normocephalic and atraumatic.      Right Ear: Tympanic membrane, ear canal and external ear normal. There is no impacted cerumen.      Left Ear: Tympanic membrane, ear canal and external ear normal. There is no impacted cerumen.      Nose: Nose normal. No congestion or rhinorrhea.      Mouth/Throat:      Mouth: Mucous membranes are moist.      Pharynx: Oropharynx is clear. No oropharyngeal exudate or posterior oropharyngeal erythema.   Eyes:      General: No scleral icterus.        Right eye: No discharge.         Left eye: No discharge.      Extraocular Movements: Extraocular movements intact.      Conjunctiva/sclera: Conjunctivae normal.      Pupils: Pupils are equal, round, and reactive to light.   Neck:      Vascular: No carotid bruit.   Cardiovascular:      Rate and Rhythm: Normal rate and regular rhythm.      Pulses: Normal pulses.      Heart sounds: Murmur heard.      No friction rub. No gallop.   Pulmonary:      Effort: Pulmonary effort is normal. No respiratory distress.      Breath sounds: No stridor. No wheezing, rhonchi or rales.

## 2024-12-05 ENCOUNTER — TELEPHONE (OUTPATIENT)
Dept: FAMILY MEDICINE CLINIC | Age: 79
End: 2024-12-05

## 2024-12-05 NOTE — TELEPHONE ENCOUNTER
On 12/5/24 I called patient nurse to see if they needed the phone number to call and schedule the test. Sahra had been off she is calling Monday when she is back to work to schedule.

## 2024-12-12 LAB — DIABETIC RETINOPATHY: NEGATIVE

## 2024-12-26 ENCOUNTER — OFFICE VISIT (OUTPATIENT)
Dept: FAMILY MEDICINE CLINIC | Age: 79
End: 2024-12-26

## 2024-12-26 VITALS
BODY MASS INDEX: 42.99 KG/M2 | DIASTOLIC BLOOD PRESSURE: 80 MMHG | TEMPERATURE: 97.1 F | SYSTOLIC BLOOD PRESSURE: 122 MMHG | WEIGHT: 258 LBS | HEART RATE: 59 BPM | HEIGHT: 65 IN | OXYGEN SATURATION: 99 %

## 2024-12-26 DIAGNOSIS — F79 MENTAL DEFICIENCY: ICD-10-CM

## 2024-12-26 DIAGNOSIS — E66.01 CLASS 3 SEVERE OBESITY DUE TO EXCESS CALORIES WITH SERIOUS COMORBIDITY AND BODY MASS INDEX (BMI) OF 45.0 TO 49.9 IN ADULT: ICD-10-CM

## 2024-12-26 DIAGNOSIS — M79.675 PAIN OF LEFT GREAT TOE: ICD-10-CM

## 2024-12-26 DIAGNOSIS — E66.813 CLASS 3 SEVERE OBESITY DUE TO EXCESS CALORIES WITH SERIOUS COMORBIDITY AND BODY MASS INDEX (BMI) OF 45.0 TO 49.9 IN ADULT: ICD-10-CM

## 2024-12-26 DIAGNOSIS — L03.116 CELLULITIS OF LEFT LEG: Primary | ICD-10-CM

## 2024-12-26 DIAGNOSIS — M15.0 PRIMARY OSTEOARTHRITIS INVOLVING MULTIPLE JOINTS: ICD-10-CM

## 2024-12-26 DIAGNOSIS — R13.10 DYSPHAGIA, UNSPECIFIED TYPE: ICD-10-CM

## 2024-12-26 DIAGNOSIS — R39.81 URINARY INCONTINENCE DUE TO COGNITIVE IMPAIRMENT: ICD-10-CM

## 2024-12-26 DIAGNOSIS — E78.5 HYPERLIPIDEMIA, UNSPECIFIED HYPERLIPIDEMIA TYPE: ICD-10-CM

## 2024-12-26 DIAGNOSIS — E11.22 TYPE 2 DIABETES MELLITUS WITH STAGE 3B CHRONIC KIDNEY DISEASE, WITHOUT LONG-TERM CURRENT USE OF INSULIN (HCC): ICD-10-CM

## 2024-12-26 DIAGNOSIS — R01.1 CARDIAC MURMUR, UNSPECIFIED: ICD-10-CM

## 2024-12-26 DIAGNOSIS — N18.32 TYPE 2 DIABETES MELLITUS WITH STAGE 3B CHRONIC KIDNEY DISEASE, WITHOUT LONG-TERM CURRENT USE OF INSULIN (HCC): ICD-10-CM

## 2024-12-26 DIAGNOSIS — N18.32 STAGE 3B CHRONIC KIDNEY DISEASE (HCC): ICD-10-CM

## 2024-12-26 DIAGNOSIS — I12.9 HYPERTENSIVE RENAL DISEASE: ICD-10-CM

## 2024-12-26 DIAGNOSIS — R15.9 FULL INCONTINENCE-FECES: ICD-10-CM

## 2024-12-26 DIAGNOSIS — I10 ESSENTIAL HYPERTENSION, MALIGNANT: ICD-10-CM

## 2024-12-26 RX ORDER — TRAMADOL HYDROCHLORIDE 50 MG/1
50 TABLET ORAL EVERY 6 HOURS PRN
Qty: 90 TABLET | Refills: 3 | Status: SHIPPED | OUTPATIENT
Start: 2024-12-26 | End: 2025-03-26

## 2024-12-26 RX ORDER — MUPIROCIN 20 MG/G
OINTMENT TOPICAL
Qty: 1 EACH | Refills: 1 | Status: SHIPPED | OUTPATIENT
Start: 2024-12-26 | End: 2025-01-02

## 2024-12-26 ASSESSMENT — ENCOUNTER SYMPTOMS
VOMITING: 0
ABDOMINAL PAIN: 0
BLOOD IN STOOL: 0
ANAL BLEEDING: 0
COLOR CHANGE: 0
SORE THROAT: 0
SHORTNESS OF BREATH: 0
RECTAL PAIN: 0
EYE PAIN: 0
STRIDOR: 0
CONSTIPATION: 0
CHOKING: 0
TROUBLE SWALLOWING: 0
FACIAL SWELLING: 0
EYE REDNESS: 0
COUGH: 0
PHOTOPHOBIA: 0
DIARRHEA: 0
SINUS PRESSURE: 0
WHEEZING: 0
BACK PAIN: 0
APNEA: 0
RHINORRHEA: 0
SINUS PAIN: 0
EYE DISCHARGE: 0
VOICE CHANGE: 0
EYE ITCHING: 0
NAUSEA: 0
ABDOMINAL DISTENTION: 0
CHEST TIGHTNESS: 0

## 2024-12-26 NOTE — PROGRESS NOTES
use of insulin (HCC)    Stage 3b chronic kidney disease (HCC)    Class 3 severe obesity due to excess calories with serious comorbidity and body mass index (BMI) of 45.0 to 49.9 in adult    Cardiac murmur, unspecified    Mental deficiency    Dysphagia, unspecified type    Primary osteoarthritis involving multiple joints    Hyperlipidemia, unspecified hyperlipidemia type    Hypertensive renal disease    Urinary incontinence due to cognitive impairment    Full incontinence-feces    Essential hypertension, malignant    Other orders  -     mupirocin (BACTROBAN) 2 % ointment; Apply topically1 times daily.        Discussions/Education provided to patients during visit:  [] Discussed the importance to stop smoking.  [] Advised to monitor eating habits.  [] Reviewed and discussed Imaging results.  [] Reviewed and discussed Lab results.  [] Discussed the importance of drinking plenty of fluids.  [] Cut down on Salt and Caffeine.  [] Exercise 2-3 times weekly, if not more.  [] Cut down on Sugar and Fats.  [x] Continue Medications as Discussed.  [x] Communicated with patient any concerns, to phone office.  [x] Follow up as directed.  Patient is to see podiatry regarding left great toe injury.    Return in about 1 month (around 1/26/2025).      Seen By:      DARREN Boles - CNP

## 2024-12-26 NOTE — TELEPHONE ENCOUNTER
Name of Medication(s) Requested:  Requested Prescriptions     Pending Prescriptions Disp Refills    traMADol (ULTRAM) 50 MG tablet [Pharmacy Med Name: TRAMADOL HCL 50MG TABLET] 93 tablet 3     Si TAB BY MOUTH 3 TIMES A DAY (KNEE PAIN)       Medication is on current medication list Yes    Dosage and directions were verified? Yes    Quantity verified: 30 day supply     Pharmacy Verified?  Yes    Last Appointment:  2024    Future appts:  Future Appointments   Date Time Provider Department Center   2025  1:00 PM [facility] XRAY RM 2 [facility] RAD [facility]   1/15/2025  8:45 AM Latha Almaraz, APRN - CNP E. PAL [facility] DEP        (If no appt send self scheduling link. .REFILLAPPT)  Scheduling request sent?     [] Yes  [x] No    Does patient need updated?  [] Yes  [x] No

## 2025-01-09 ENCOUNTER — HOSPITAL ENCOUNTER (OUTPATIENT)
Dept: GENERAL RADIOLOGY | Age: 80
Discharge: HOME | End: 2025-01-11
Payer: MEDICARE

## 2025-01-09 DIAGNOSIS — R13.10 DYSPHAGIA, UNSPECIFIED TYPE: ICD-10-CM

## 2025-01-09 PROCEDURE — 74230 X-RAY XM SWLNG FUNCJ C+: CPT

## 2025-01-09 PROCEDURE — 92526 ORAL FUNCTION THERAPY: CPT

## 2025-01-09 PROCEDURE — 2500000003 HC RX 250 WO HCPCS: Performed by: RADIOLOGY

## 2025-01-09 PROCEDURE — 92611 MOTION FLUOROSCOPY/SWALLOW: CPT

## 2025-01-09 RX ADMIN — BARIUM SULFATE 10 ML: 400 PASTE ORAL at 13:02

## 2025-01-09 RX ADMIN — BARIUM SULFATE 70 G: 0.81 POWDER, FOR SUSPENSION ORAL at 13:02

## 2025-01-09 NOTE — PROGRESS NOTES
SPEECH/LANGUAGE PATHOLOGY  VIDEOFLUOROSCOPIC STUDY OF SWALLOWING (MBS)   and PLAN OF CARE    PATIENT NAME:  Irvin Hunt  (male)     MRN:  [id number]    :  1945  (79 y.o.)  STATUS:  Outpatient    TODAY'S DATE:  2025  ORDER DATE, DESCRIPTION AND REFERRING PROVIDER:   DARREN Stokes-CNP : FL MODIFIED BARIUM SWALLOW W VIDEO   REASON FOR REFERRAL: Assess oropharyngeal swallow function    EVALUATING THERAPIST: Candace Marin SLP      RESULTS:      DYSPHAGIA DIAGNOSIS:  Moderate oral phase dysphagia; minimal pharyngeal phase dysphagia      DIET RECOMMENDATIONS:  Minced and moist consistency solids (IDDSI level 5) with  thin liquids (IDDSI level 0)    FEEDING RECOMMENDATIONS:    Assistance level:  Set-up is required for all oral intake     Compensatory strategies recommended: No strategies are recommended at this time     Discussed recommendations with:  report sent to referring provider    SPEECH THERAPY  PLAN OF CARE   The dysphagia POC is established based on physician order and dysphagia diagnosis    Dysphagia therapy is not recommended       Conditions Requiring Skilled Therapeutic Intervention for dysphagia:    not applicable    SPECIFIC DYSPHAGIA INTERVENTIONS TO INCLUDE:     Not applicable    Specific instructions for next treatment:  not applicable   Treatment Goals:    Short Term Goals:  Not applicable no therapy warranted     Long Term Goals:   Not applicable no therapy warranted      Patient/family Goal:    not applicable                    ADMITTING DIAGNOSIS: Dysphagia, unspecified type [R13.10]     VISIT DIAGNOSIS:   Visit Diagnoses         Codes    Dysphagia, unspecified type     R13.10                PATIENT REPORT/COMPLAINT: difficulty chewing    PRIOR LEVEL OF SWALLOW FUNCTION:    Past History of Dysphagia?:  yes    Home diet: Minced and moist consistency solids (IDDSI level 5) with  thin liquids (IDDSI level 0)      Current Diet Order:  No diet orders on file    PROCEDURE:  Consistencies

## 2025-01-24 ENCOUNTER — OFFICE VISIT (OUTPATIENT)
Dept: FAMILY MEDICINE CLINIC | Age: 80
End: 2025-01-24

## 2025-01-24 VITALS
SYSTOLIC BLOOD PRESSURE: 124 MMHG | BODY MASS INDEX: 42.37 KG/M2 | HEART RATE: 92 BPM | TEMPERATURE: 97.6 F | HEIGHT: 65 IN | DIASTOLIC BLOOD PRESSURE: 72 MMHG | OXYGEN SATURATION: 100 % | WEIGHT: 254.3 LBS

## 2025-01-24 DIAGNOSIS — R13.10 DYSPHAGIA, UNSPECIFIED TYPE: ICD-10-CM

## 2025-01-24 DIAGNOSIS — T25.039A: ICD-10-CM

## 2025-01-24 DIAGNOSIS — R01.1 CARDIAC MURMUR, UNSPECIFIED: ICD-10-CM

## 2025-01-24 DIAGNOSIS — N18.32 STAGE 3B CHRONIC KIDNEY DISEASE (HCC): ICD-10-CM

## 2025-01-24 DIAGNOSIS — N18.32 TYPE 2 DIABETES MELLITUS WITH STAGE 3B CHRONIC KIDNEY DISEASE, WITHOUT LONG-TERM CURRENT USE OF INSULIN (HCC): Primary | ICD-10-CM

## 2025-01-24 DIAGNOSIS — M15.0 PRIMARY OSTEOARTHRITIS INVOLVING MULTIPLE JOINTS: ICD-10-CM

## 2025-01-24 DIAGNOSIS — F79 MENTAL DEFICIENCY: ICD-10-CM

## 2025-01-24 DIAGNOSIS — E11.22 TYPE 2 DIABETES MELLITUS WITH STAGE 3B CHRONIC KIDNEY DISEASE, WITHOUT LONG-TERM CURRENT USE OF INSULIN (HCC): Primary | ICD-10-CM

## 2025-01-24 DIAGNOSIS — Z12.5 SCREENING FOR PROSTATE CANCER: ICD-10-CM

## 2025-01-24 ASSESSMENT — ENCOUNTER SYMPTOMS
EYE DISCHARGE: 0
EYE PAIN: 0
ABDOMINAL PAIN: 0
RECTAL PAIN: 0
EYE ITCHING: 0
APNEA: 0
STRIDOR: 0
EYE REDNESS: 0
SHORTNESS OF BREATH: 0
BACK PAIN: 0
VOMITING: 0
ABDOMINAL DISTENTION: 0
SINUS PRESSURE: 0
PHOTOPHOBIA: 0
ANAL BLEEDING: 0
FACIAL SWELLING: 0
SORE THROAT: 0
CHOKING: 0
WHEEZING: 0
NAUSEA: 0
CONSTIPATION: 0
VOICE CHANGE: 0
TROUBLE SWALLOWING: 0
COLOR CHANGE: 0
BLOOD IN STOOL: 0
CHEST TIGHTNESS: 0
RHINORRHEA: 0
COUGH: 0
DIARRHEA: 0
SINUS PAIN: 0

## 2025-01-24 NOTE — PROGRESS NOTES
25  Irvin Hunt : 1945 Sex: male  Age: 79 y.o.    Chief Complaint   Patient presents with    Other     Follow up on right toe and discuss swallow eval , and needs an order for a hospital bed        Patient is here today accompanied by his nurse from [facility] to follow-up on his great toe wound he dropped something hot on his toe and birch it.  He has been using Bactroban ointment to the area and it is healing.  He is also seeing podiatry.  Patient also has been being evaluated for's dysphagia.  He is eating with chopped foods and is doing well with that.  He did have a swallowing evaluation and that was the recommendation per speech therapy.        Review of Systems   Constitutional:  Negative for activity change, appetite change, chills, diaphoresis, fatigue, fever and unexpected weight change.   HENT:  Negative for congestion, dental problem, drooling, ear discharge, ear pain, facial swelling, hearing loss, mouth sores, nosebleeds, postnasal drip, rhinorrhea, sinus pressure, sinus pain, sneezing, sore throat, tinnitus, trouble swallowing and voice change.    Eyes:  Negative for photophobia, pain, discharge, redness, itching and visual disturbance.   Respiratory:  Negative for apnea, cough, choking, chest tightness, shortness of breath, wheezing and stridor.    Cardiovascular:  Negative for chest pain, palpitations and leg swelling.   Gastrointestinal:  Negative for abdominal distention, abdominal pain, anal bleeding, blood in stool, constipation, diarrhea, nausea, rectal pain and vomiting.   Endocrine: Negative for cold intolerance, heat intolerance, polydipsia, polyphagia and polyuria.   Genitourinary:  Negative for decreased urine volume, difficulty urinating, dysuria, enuresis, flank pain, frequency, genital sores, hematuria and urgency.   Musculoskeletal:  Negative for arthralgias, back pain, gait problem, joint swelling, myalgias, neck pain and neck stiffness.   Skin:  Positive for wound.

## 2025-01-31 ENCOUNTER — TELEPHONE (OUTPATIENT)
Dept: FAMILY MEDICINE CLINIC | Age: 80
End: 2025-01-31

## 2025-01-31 NOTE — TELEPHONE ENCOUNTER
Prior authorization started for [employer] through [facility].  Key: [id number]

## 2025-02-11 ENCOUNTER — TELEPHONE (OUTPATIENT)
Dept: FAMILY MEDICINE CLINIC | Age: 80
End: 2025-02-11

## 2025-02-11 RX ORDER — FESOTERODINE FUMARATE 8 MG/1
8 TABLET, FILM COATED, EXTENDED RELEASE ORAL DAILY
Qty: 30 TABLET | Refills: 5 | Status: SHIPPED | OUTPATIENT
Start: 2025-02-11

## 2025-02-19 ENCOUNTER — TELEPHONE (OUTPATIENT)
Dept: FAMILY MEDICINE CLINIC | Age: 80
End: 2025-02-19
Payer: MEDICARE

## 2025-02-19 DIAGNOSIS — R31.9 HEMATURIA, UNSPECIFIED TYPE: Primary | ICD-10-CM

## 2025-02-19 LAB
BILIRUBIN, POC: NEGATIVE
BLOOD URINE, POC: NORMAL
CLARITY, POC: NORMAL
COLOR, POC: NORMAL
GLUCOSE URINE, POC: NEGATIVE MG/DL
KETONES, POC: NEGATIVE MG/DL
LEUKOCYTE EST, POC: NORMAL
NITRITE, POC: NEGATIVE
PH, POC: 6
PROTEIN, POC: NORMAL MG/DL
SPECIFIC GRAVITY, POC: 1.02
UROBILINOGEN, POC: NORMAL MG/DL

## 2025-02-19 PROCEDURE — 81002 URINALYSIS NONAUTO W/O SCOPE: CPT | Performed by: NURSE PRACTITIONER

## 2025-02-19 RX ORDER — SULFAMETHOXAZOLE AND TRIMETHOPRIM 800; 160 MG/1; MG/1
1 TABLET ORAL 2 TIMES DAILY
Qty: 10 TABLET | Refills: 0 | Status: SHIPPED | OUTPATIENT
Start: 2025-02-19 | End: 2025-02-24

## 2025-02-19 NOTE — TELEPHONE ENCOUNTER
Pt does not see urology he sees nephrology, Can you place a referral for Dr. Grayson in [place] ?    Pended referral if so

## 2025-02-19 NOTE — TELEPHONE ENCOUNTER
Pts nurse Sahra from [facility] brought a urine down due to finding blood in his depends. Ran the UA and large RENEE and large Blood. I will send it for a culture.

## 2025-02-19 NOTE — PROGRESS NOTES
Patient's caretaker brings urine sample to the office for concerns of blood in the depends that he wears.  Urinalysis was done and shows large leukocytes and large blood.  Will treat initially with Bactrim.  Think it is highly important the patient does establish with urology if he has not already done so if he has done so please go to urology.

## 2025-02-21 LAB
CULTURE: NORMAL
SPECIMEN DESCRIPTION: NORMAL

## 2025-03-18 DIAGNOSIS — N30.01 ACUTE CYSTITIS WITH HEMATURIA: Primary | ICD-10-CM

## 2025-03-18 DIAGNOSIS — N30.01 ACUTE CYSTITIS WITH HEMATURIA: ICD-10-CM

## 2025-03-18 LAB
BILIRUBIN, POC: NEGATIVE
BLOOD URINE, POC: NORMAL
CLARITY, POC: NORMAL
COLOR, POC: NORMAL
GLUCOSE URINE, POC: NEGATIVE MG/DL
KETONES, POC: NEGATIVE MG/DL
LEUKOCYTE EST, POC: NORMAL
NITRITE, POC: NEGATIVE
PH, POC: 6.5
PROTEIN, POC: 100 MG/DL
SPECIFIC GRAVITY, POC: 1.02
UROBILINOGEN, POC: 1 MG/DL

## 2025-03-18 PROCEDURE — 81002 URINALYSIS NONAUTO W/O SCOPE: CPT | Performed by: NURSE PRACTITIONER

## 2025-03-19 ENCOUNTER — TELEPHONE (OUTPATIENT)
Dept: FAMILY MEDICINE CLINIC | Age: 80
End: 2025-03-19

## 2025-03-19 NOTE — TELEPHONE ENCOUNTER
Received call from Sahra (home care nurse) wanting to know if PCP will be putting the pt on an antibiotic. PCP out of office today. Attempted to call Sahra, got voice mail telling me mailbox is full.

## 2025-03-21 ENCOUNTER — RESULTS FOLLOW-UP (OUTPATIENT)
Dept: FAMILY MEDICINE CLINIC | Age: 80
End: 2025-03-21

## 2025-03-21 LAB
CULTURE: ABNORMAL
CULTURE: ABNORMAL
SPECIMEN DESCRIPTION: ABNORMAL

## 2025-03-21 RX ORDER — SULFAMETHOXAZOLE AND TRIMETHOPRIM 800; 160 MG/1; MG/1
1 TABLET ORAL 2 TIMES DAILY
Qty: 20 TABLET | Refills: 0 | Status: SHIPPED | OUTPATIENT
Start: 2025-03-21 | End: 2025-03-31

## 2025-04-17 ENCOUNTER — LAB (OUTPATIENT)
Dept: FAMILY MEDICINE CLINIC | Age: 80
End: 2025-04-17
Payer: MEDICARE

## 2025-04-17 DIAGNOSIS — E11.22 TYPE 2 DIABETES MELLITUS WITH STAGE 3B CHRONIC KIDNEY DISEASE, WITHOUT LONG-TERM CURRENT USE OF INSULIN (HCC): ICD-10-CM

## 2025-04-17 DIAGNOSIS — N18.32 TYPE 2 DIABETES MELLITUS WITH STAGE 3B CHRONIC KIDNEY DISEASE, WITHOUT LONG-TERM CURRENT USE OF INSULIN (HCC): ICD-10-CM

## 2025-04-17 DIAGNOSIS — Z12.5 SCREENING FOR PROSTATE CANCER: ICD-10-CM

## 2025-04-17 DIAGNOSIS — N18.32 STAGE 3B CHRONIC KIDNEY DISEASE (HCC): ICD-10-CM

## 2025-04-17 DIAGNOSIS — R01.1 CARDIAC MURMUR, UNSPECIFIED: ICD-10-CM

## 2025-04-17 LAB
BASOPHILS ABSOLUTE: 0.03 K/UL (ref 0–0.2)
BASOPHILS RELATIVE PERCENT: 0 % (ref 0–2)
EOSINOPHILS ABSOLUTE: 0.14 K/UL (ref 0.05–0.5)
EOSINOPHILS RELATIVE PERCENT: 2 % (ref 0–6)
HCT VFR BLD CALC: 37.5 % (ref 37–54)
HEMOGLOBIN: 11.7 G/DL (ref 12.5–16.5)
IMMATURE GRANULOCYTES %: 1 % (ref 0–5)
IMMATURE GRANULOCYTES ABSOLUTE: 0.06 K/UL (ref 0–0.58)
LYMPHOCYTES ABSOLUTE: 1.48 K/UL (ref 1.5–4)
LYMPHOCYTES RELATIVE PERCENT: 20 % (ref 20–42)
MCH RBC QN AUTO: 32.2 PG (ref 26–35)
MCHC RBC AUTO-ENTMCNC: 31.2 G/DL (ref 32–34.5)
MCV RBC AUTO: 103.3 FL (ref 80–99.9)
MONOCYTES ABSOLUTE: 0.66 K/UL (ref 0.1–0.95)
MONOCYTES RELATIVE PERCENT: 9 % (ref 2–12)
NEUTROPHILS ABSOLUTE: 4.95 K/UL (ref 1.8–7.3)
NEUTROPHILS RELATIVE PERCENT: 68 % (ref 43–80)
PDW BLD-RTO: 16.6 % (ref 11.5–15)
PLATELET # BLD: 265 K/UL (ref 130–450)
PMV BLD AUTO: 9.2 FL (ref 7–12)
RBC # BLD: 3.63 M/UL (ref 3.8–5.8)
WBC # BLD: 7.3 K/UL (ref 4.5–11.5)

## 2025-04-17 PROCEDURE — 36415 COLL VENOUS BLD VENIPUNCTURE: CPT | Performed by: NURSE PRACTITIONER

## 2025-04-18 ENCOUNTER — RESULTS FOLLOW-UP (OUTPATIENT)
Dept: FAMILY MEDICINE CLINIC | Age: 80
End: 2025-04-18

## 2025-04-18 LAB
ALBUMIN: 3.8 G/DL (ref 3.5–5.2)
ALP BLD-CCNC: 121 U/L (ref 40–129)
ALT SERPL-CCNC: 33 U/L (ref 0–50)
ANION GAP SERPL CALCULATED.3IONS-SCNC: 10 MMOL/L (ref 7–16)
AST SERPL-CCNC: 34 U/L (ref 0–50)
BILIRUB SERPL-MCNC: 0.4 MG/DL (ref 0–1.2)
BUN BLDV-MCNC: 23 MG/DL (ref 8–23)
CALCIUM SERPL-MCNC: 9.9 MG/DL (ref 8.8–10.2)
CHLORIDE BLD-SCNC: 107 MMOL/L (ref 98–107)
CHOLESTEROL, TOTAL: 132 MG/DL
CO2: 24 MMOL/L (ref 22–29)
CREAT SERPL-MCNC: 1.4 MG/DL (ref 0.7–1.2)
CREATININE URINE: 84.1 MG/DL (ref 40–278)
GFR, ESTIMATED: 53 ML/MIN/1.73M2
GLUCOSE BLD-MCNC: 100 MG/DL (ref 74–99)
HBA1C MFR BLD: 5.6 % (ref 4–5.6)
HDLC SERPL-MCNC: 53 MG/DL
LDL CHOLESTEROL: 66 MG/DL
MICROALBUMIN/CREAT 24H UR: 183 MG/L (ref 0–20)
MICROALBUMIN/CREAT UR-RTO: 218 MCG/MG CREAT (ref 0–30)
POTASSIUM SERPL-SCNC: 5.3 MMOL/L (ref 3.5–5.1)
PROSTATE SPECIFIC ANTIGEN: 2.09 NG/ML (ref 0–4)
SODIUM BLD-SCNC: 141 MMOL/L (ref 136–145)
THYROXINE (T4): 6.4 UG/DL (ref 4.5–11.7)
TOTAL PROTEIN: 6.8 G/DL (ref 6.4–8.3)
TRIGL SERPL-MCNC: 64 MG/DL
TSH SERPL DL<=0.05 MIU/L-ACNC: 5.45 UIU/ML (ref 0.27–4.2)
VLDLC SERPL CALC-MCNC: 13 MG/DL

## 2025-04-24 ENCOUNTER — OFFICE VISIT (OUTPATIENT)
Dept: FAMILY MEDICINE CLINIC | Age: 80
End: 2025-04-24
Payer: MEDICARE

## 2025-04-24 ENCOUNTER — OFFICE VISIT (OUTPATIENT)
Dept: FAMILY MEDICINE CLINIC | Age: 80
End: 2025-04-24

## 2025-04-24 VITALS
OXYGEN SATURATION: 93 % | HEIGHT: 65 IN | TEMPERATURE: 97.3 F | HEART RATE: 72 BPM | WEIGHT: 241 LBS | BODY MASS INDEX: 40.15 KG/M2 | DIASTOLIC BLOOD PRESSURE: 64 MMHG | SYSTOLIC BLOOD PRESSURE: 120 MMHG | RESPIRATION RATE: 16 BRPM

## 2025-04-24 VITALS
DIASTOLIC BLOOD PRESSURE: 64 MMHG | BODY MASS INDEX: 40.15 KG/M2 | HEIGHT: 65 IN | SYSTOLIC BLOOD PRESSURE: 120 MMHG | WEIGHT: 241 LBS | TEMPERATURE: 97.3 F | HEART RATE: 72 BPM | OXYGEN SATURATION: 93 % | RESPIRATION RATE: 16 BRPM

## 2025-04-24 DIAGNOSIS — N18.32 TYPE 2 DIABETES MELLITUS WITH STAGE 3B CHRONIC KIDNEY DISEASE, WITHOUT LONG-TERM CURRENT USE OF INSULIN (HCC): ICD-10-CM

## 2025-04-24 DIAGNOSIS — R29.6 RECURRENT FALLS: Primary | ICD-10-CM

## 2025-04-24 DIAGNOSIS — F79 MENTAL DEFICIENCY: ICD-10-CM

## 2025-04-24 DIAGNOSIS — E66.813 CLASS 3 SEVERE OBESITY DUE TO EXCESS CALORIES WITH SERIOUS COMORBIDITY AND BODY MASS INDEX (BMI) OF 40.0 TO 44.9 IN ADULT (HCC): ICD-10-CM

## 2025-04-24 DIAGNOSIS — R13.10 DYSPHAGIA, UNSPECIFIED TYPE: ICD-10-CM

## 2025-04-24 DIAGNOSIS — R15.9 FULL INCONTINENCE-FECES: ICD-10-CM

## 2025-04-24 DIAGNOSIS — R26.81 UNSTEADY GAIT: ICD-10-CM

## 2025-04-24 DIAGNOSIS — R53.1 WEAKNESS: ICD-10-CM

## 2025-04-24 DIAGNOSIS — E11.22 TYPE 2 DIABETES MELLITUS WITH STAGE 3B CHRONIC KIDNEY DISEASE, WITHOUT LONG-TERM CURRENT USE OF INSULIN (HCC): ICD-10-CM

## 2025-04-24 DIAGNOSIS — I10 ESSENTIAL HYPERTENSION, MALIGNANT: ICD-10-CM

## 2025-04-24 DIAGNOSIS — E03.9 HYPOTHYROIDISM, UNSPECIFIED TYPE: ICD-10-CM

## 2025-04-24 DIAGNOSIS — N18.32 STAGE 3B CHRONIC KIDNEY DISEASE (HCC): ICD-10-CM

## 2025-04-24 DIAGNOSIS — M15.0 PRIMARY OSTEOARTHRITIS INVOLVING MULTIPLE JOINTS: ICD-10-CM

## 2025-04-24 DIAGNOSIS — Z00.00 MEDICARE ANNUAL WELLNESS VISIT, SUBSEQUENT: Primary | ICD-10-CM

## 2025-04-24 DIAGNOSIS — E78.5 HYPERLIPIDEMIA, UNSPECIFIED HYPERLIPIDEMIA TYPE: ICD-10-CM

## 2025-04-24 DIAGNOSIS — R01.1 CARDIAC MURMUR, UNSPECIFIED: ICD-10-CM

## 2025-04-24 PROCEDURE — 1036F TOBACCO NON-USER: CPT | Performed by: NURSE PRACTITIONER

## 2025-04-24 PROCEDURE — 99214 OFFICE O/P EST MOD 30 MIN: CPT | Performed by: NURSE PRACTITIONER

## 2025-04-24 PROCEDURE — 3078F DIAST BP <80 MM HG: CPT | Performed by: NURSE PRACTITIONER

## 2025-04-24 PROCEDURE — 3074F SYST BP LT 130 MM HG: CPT | Performed by: NURSE PRACTITIONER

## 2025-04-24 PROCEDURE — 3044F HG A1C LEVEL LT 7.0%: CPT | Performed by: NURSE PRACTITIONER

## 2025-04-24 PROCEDURE — G8427 DOCREV CUR MEDS BY ELIG CLIN: HCPCS | Performed by: NURSE PRACTITIONER

## 2025-04-24 PROCEDURE — G8417 CALC BMI ABV UP PARAM F/U: HCPCS | Performed by: NURSE PRACTITIONER

## 2025-04-24 RX ORDER — TAMSULOSIN HYDROCHLORIDE 0.4 MG/1
CAPSULE ORAL
COMMUNITY
Start: 2025-04-01

## 2025-04-24 RX ORDER — LEVOTHYROXINE SODIUM 125 UG/1
125 TABLET ORAL DAILY
Qty: 90 TABLET | Refills: 1 | Status: SHIPPED | OUTPATIENT
Start: 2025-04-24

## 2025-04-24 RX ORDER — TRAMADOL HYDROCHLORIDE 50 MG/1
TABLET ORAL
COMMUNITY
Start: 2025-04-22

## 2025-04-24 SDOH — HEALTH STABILITY: PHYSICAL HEALTH: ON AVERAGE, HOW MANY MINUTES DO YOU ENGAGE IN EXERCISE AT THIS LEVEL?: 10 MIN

## 2025-04-24 SDOH — HEALTH STABILITY: PHYSICAL HEALTH: ON AVERAGE, HOW MANY DAYS PER WEEK DO YOU ENGAGE IN MODERATE TO STRENUOUS EXERCISE (LIKE A BRISK WALK)?: 0 DAYS

## 2025-04-24 ASSESSMENT — ENCOUNTER SYMPTOMS
SINUS PRESSURE: 0
SHORTNESS OF BREATH: 0
VOMITING: 0
EYE DISCHARGE: 0
WHEEZING: 0
SORE THROAT: 0
FACIAL SWELLING: 0
APNEA: 0
ABDOMINAL PAIN: 0
COLOR CHANGE: 0
TROUBLE SWALLOWING: 0
EYE ITCHING: 0
EYE REDNESS: 0
ABDOMINAL DISTENTION: 0
STRIDOR: 0
BLOOD IN STOOL: 0
CHEST TIGHTNESS: 0
CHOKING: 0
BACK PAIN: 0
RECTAL PAIN: 0
VOICE CHANGE: 0
COUGH: 0
RHINORRHEA: 0
SINUS PAIN: 0
DIARRHEA: 0
PHOTOPHOBIA: 0
EYE PAIN: 0
ANAL BLEEDING: 0
CONSTIPATION: 0
NAUSEA: 0

## 2025-04-24 ASSESSMENT — LIFESTYLE VARIABLES
HOW OFTEN DO YOU HAVE SIX OR MORE DRINKS ON ONE OCCASION: 1
HOW OFTEN DO YOU HAVE A DRINK CONTAINING ALCOHOL: NEVER
HOW OFTEN DO YOU HAVE A DRINK CONTAINING ALCOHOL: 1
HOW MANY STANDARD DRINKS CONTAINING ALCOHOL DO YOU HAVE ON A TYPICAL DAY: PATIENT DOES NOT DRINK
HOW MANY STANDARD DRINKS CONTAINING ALCOHOL DO YOU HAVE ON A TYPICAL DAY: 0

## 2025-04-24 ASSESSMENT — PATIENT HEALTH QUESTIONNAIRE - PHQ9
2. FEELING DOWN, DEPRESSED OR HOPELESS: NOT AT ALL
SUM OF ALL RESPONSES TO PHQ QUESTIONS 1-9: 0
1. LITTLE INTEREST OR PLEASURE IN DOING THINGS: NOT AT ALL
SUM OF ALL RESPONSES TO PHQ QUESTIONS 1-9: 0

## 2025-04-24 NOTE — PROGRESS NOTES
25  Irvin Hunt : 1945 Sex: male  Age: 79 y.o.    Chief Complaint   Patient presents with    Discuss Labs     3 month follow up, FBS 91       Patient is here today for 3-month evaluation and review of labs.  He has been doing very well with watching his diabetic diet and losing weight.  His blood sugars have been under good control in reviewing his sugars that were brought in today.  He states that he is feeling well.  He is accompanied by his nurse from [facility].  1 concern is that the patient continues to have falls.  He has had 8 falls in the last year.  They have gotten him a fall watch as well as they have 24-hour care now at the home.  He has a hospital bed to help him get in and outs of the bed and to reposition himself in the bed.  He has become progressively weak and I think it would benefit him to have physical therapy.  Patient will also be evaluated for a lifting device to be used in the home as he has had difficulty getting up from these falls and have had to have lift assistance called into the home.        Review of Systems   Constitutional:  Negative for activity change, appetite change, chills, diaphoresis, fatigue, fever and unexpected weight change.   HENT:  Negative for congestion, dental problem, drooling, ear discharge, ear pain, facial swelling, hearing loss, mouth sores, nosebleeds, postnasal drip, rhinorrhea, sinus pressure, sinus pain, sneezing, sore throat, tinnitus, trouble swallowing and voice change.    Eyes:  Negative for photophobia, pain, discharge, redness, itching and visual disturbance.   Respiratory:  Negative for apnea, cough, choking, chest tightness, shortness of breath, wheezing and stridor.    Cardiovascular:  Negative for chest pain, palpitations and leg swelling.   Gastrointestinal:  Negative for abdominal distention, abdominal pain, anal bleeding, blood in stool, constipation, diarrhea, nausea, rectal pain and vomiting.   Endocrine: Negative for cold

## 2025-04-24 NOTE — PATIENT INSTRUCTIONS

## 2025-05-06 ENCOUNTER — TELEPHONE (OUTPATIENT)
Dept: FAMILY MEDICINE CLINIC | Age: 80
End: 2025-05-06

## 2025-05-06 RX ORDER — LEVOTHYROXINE SODIUM 125 UG/1
125 TABLET ORAL DAILY
COMMUNITY

## 2025-05-15 LAB
BASOPHILS # BLD: 0.03 K/UL (ref 0–0.2)
BASOPHILS NFR BLD: 0 % (ref 0–2)
CREAT UR-MCNC: 54.4 MG/DL (ref 40–278)
CREAT UR-MCNC: 54.7 MG/DL (ref 40–278)
EOSINOPHIL # BLD: 0.12 K/UL (ref 0.05–0.5)
EOSINOPHILS RELATIVE PERCENT: 2 % (ref 0–6)
ERYTHROCYTE [DISTWIDTH] IN BLOOD BY AUTOMATED COUNT: 15.4 % (ref 11.5–15)
HCT VFR BLD AUTO: 38.1 % (ref 37–54)
HGB BLD-MCNC: 11.9 G/DL (ref 12.5–16.5)
IMM GRANULOCYTES # BLD AUTO: 0.03 K/UL (ref 0–0.58)
IMM GRANULOCYTES NFR BLD: 0 % (ref 0–5)
LYMPHOCYTES NFR BLD: 1.56 K/UL (ref 1.5–4)
LYMPHOCYTES RELATIVE PERCENT: 20 % (ref 20–42)
MAGNESIUM SERPL-MCNC: 1.9 MG/DL (ref 1.6–2.4)
MCH RBC QN AUTO: 32.3 PG (ref 26–35)
MCHC RBC AUTO-ENTMCNC: 31.2 G/DL (ref 32–34.5)
MCV RBC AUTO: 103.5 FL (ref 80–99.9)
MICROALBUMIN UR-MCNC: 130 MG/L (ref 0–20)
MICROALBUMIN/CREAT UR-RTO: 238 MCG/MG CREAT (ref 0–30)
MONOCYTES NFR BLD: 0.74 K/UL (ref 0.1–0.95)
MONOCYTES NFR BLD: 10 % (ref 2–12)
NEUTROPHILS NFR BLD: 68 % (ref 43–80)
NEUTS SEG NFR BLD: 5.22 K/UL (ref 1.8–7.3)
PHOSPHATE SERPL-MCNC: 4 MG/DL (ref 2.5–4.5)
PLATELET # BLD AUTO: 284 K/UL (ref 130–450)
PMV BLD AUTO: 9.5 FL (ref 7–12)
RBC # BLD AUTO: 3.68 M/UL (ref 3.8–5.8)
TOTAL PROTEIN, URINE: 31 MG/DL (ref 0–12)
URATE SERPL-MCNC: 5.6 MG/DL (ref 3.4–7)
URINE TOTAL PROTEIN CREATININE RATIO: 0.57 (ref 0–0.2)
WBC OTHER # BLD: 7.7 K/UL (ref 4.5–11.5)

## 2025-06-03 ENCOUNTER — TELEPHONE (OUTPATIENT)
Dept: FAMILY MEDICINE CLINIC | Age: 80
End: 2025-06-03

## 2025-06-24 DIAGNOSIS — M15.0 PRIMARY OSTEOARTHRITIS INVOLVING MULTIPLE JOINTS: ICD-10-CM

## 2025-06-24 DIAGNOSIS — F79 MENTAL DEFICIENCY: Primary | ICD-10-CM

## 2025-06-25 RX ORDER — TRAMADOL HYDROCHLORIDE 50 MG/1
50 TABLET ORAL EVERY 8 HOURS PRN
Qty: 93 TABLET | Refills: 0 | Status: SHIPPED | OUTPATIENT
Start: 2025-06-25 | End: 2025-07-25

## 2025-07-30 ENCOUNTER — OFFICE VISIT (OUTPATIENT)
Age: 80
End: 2025-07-30

## 2025-07-30 VITALS
WEIGHT: 242 LBS | DIASTOLIC BLOOD PRESSURE: 70 MMHG | OXYGEN SATURATION: 99 % | RESPIRATION RATE: 16 BRPM | HEART RATE: 68 BPM | TEMPERATURE: 97.2 F | SYSTOLIC BLOOD PRESSURE: 130 MMHG | BODY MASS INDEX: 40.32 KG/M2 | HEIGHT: 65 IN

## 2025-07-30 DIAGNOSIS — S91.109A OPEN WOUND OF TOE, INITIAL ENCOUNTER: Primary | ICD-10-CM

## 2025-07-30 DIAGNOSIS — L72.3 SEBACEOUS CYST: ICD-10-CM

## 2025-07-30 RX ORDER — HYDROCORTISONE 1 G/100G
CREAM TOPICAL
COMMUNITY
Start: 2025-06-30

## 2025-07-30 RX ORDER — TRAMADOL HYDROCHLORIDE 50 MG/1
50 TABLET ORAL EVERY 8 HOURS PRN
COMMUNITY
Start: 2025-07-22

## 2025-07-30 RX ORDER — IBUPROFEN 200 MG
CAPSULE ORAL
COMMUNITY
Start: 2025-07-30

## 2025-07-30 RX ORDER — MUPIROCIN 2 %
OINTMENT (GRAM) TOPICAL
Qty: 1 EACH | Refills: 0 | Status: SHIPPED | OUTPATIENT
Start: 2025-07-30 | End: 2025-08-06

## 2025-07-30 RX ORDER — AD PREVENT ORIGINAL 15.5; 53.4 G/100G; G/100G
OINTMENT TOPICAL
COMMUNITY
Start: 2025-06-30

## 2025-07-30 RX ORDER — CEPHALEXIN 500 MG/1
500 CAPSULE ORAL 3 TIMES DAILY
Qty: 21 CAPSULE | Refills: 0 | Status: SHIPPED | OUTPATIENT
Start: 2025-07-30 | End: 2025-08-06

## 2025-07-30 NOTE — PROGRESS NOTES
(PROZAC) 40 MG capsule, Take 1 capsule by mouth daily Take with 20 mg capsuleto equal 60 mg daily, Disp: , Rfl:     docusate sodium (COLACE) 100 MG capsule, Take 1 capsule by mouth 1 CAPSULE EVERY MORNING AND 2 CAPSULES NIGHTLY, Disp: , Rfl:     diclofenac sodium (VOLTAREN) 1 % GEL, Apply 4 g topically 4 times daily as needed, Disp: , Rfl:   No Known Allergies    Past Medical History:   Diagnosis Date    Chronic kidney disease (CKD), stage III (moderate) (HCC)     Diabetes mellitus (HCC)     DJD (degenerative joint disease)     Facies suggestive of Down syndrome     Hearing impaired     Hyperlipidemia     Hypertension     Hypothyroid     Mental deficiency     Urge urinary incontinence      Past Surgical History:   Procedure Laterality Date    COLONOSCOPY      COLONOSCOPY N/A 8/11/2021    COLONOSCOPY DIAGNOSTIC performed by Mike Zurita DO at [facility] ENDOSCOPY     No family history on file.  Social History     Socioeconomic History    Marital status: Single     Spouse name: Not on file    Number of children: Not on file    Years of education: Not on file    Highest education level: Not on file   Occupational History    Not on file   Tobacco Use    Smoking status: Never     Passive exposure: Never    Smokeless tobacco: Never   Vaping Use    Vaping status: Never Used   Substance and Sexual Activity    Alcohol use: Never    Drug use: Never    Sexual activity: Never   Other Topics Concern    Not on file   Social History Narrative    Not on file     Social Drivers of Health     Financial Resource Strain: Low Risk  (4/9/2024)    Overall Financial Resource Strain (CARDIA)     Difficulty of Paying Living Expenses: Not hard at all   Food Insecurity: No Food Insecurity (11/9/2024)    Hunger Vital Sign     Worried About Running Out of Food in the Last Year: Never true     Ran Out of Food in the Last Year: Never true   Transportation Needs: No Transportation Needs (11/9/2024)    PRAPARE - Transportation     Lack of

## 2025-08-11 ENCOUNTER — OFFICE VISIT (OUTPATIENT)
Dept: FAMILY MEDICINE CLINIC | Age: 80
End: 2025-08-11

## 2025-08-11 VITALS
BODY MASS INDEX: 38.51 KG/M2 | TEMPERATURE: 97.2 F | RESPIRATION RATE: 16 BRPM | SYSTOLIC BLOOD PRESSURE: 122 MMHG | WEIGHT: 231.1 LBS | HEIGHT: 65 IN | DIASTOLIC BLOOD PRESSURE: 68 MMHG | OXYGEN SATURATION: 96 % | HEART RATE: 55 BPM

## 2025-08-11 DIAGNOSIS — R15.9 FULL INCONTINENCE-FECES: ICD-10-CM

## 2025-08-11 DIAGNOSIS — I10 ESSENTIAL HYPERTENSION, MALIGNANT: ICD-10-CM

## 2025-08-11 DIAGNOSIS — R39.81 URINARY INCONTINENCE DUE TO COGNITIVE IMPAIRMENT: ICD-10-CM

## 2025-08-11 DIAGNOSIS — R01.1 CARDIAC MURMUR, UNSPECIFIED: ICD-10-CM

## 2025-08-11 DIAGNOSIS — S91.109A OPEN WOUND OF TOE, INITIAL ENCOUNTER: ICD-10-CM

## 2025-08-11 DIAGNOSIS — N18.32 TYPE 2 DIABETES MELLITUS WITH STAGE 3B CHRONIC KIDNEY DISEASE, WITHOUT LONG-TERM CURRENT USE OF INSULIN (HCC): ICD-10-CM

## 2025-08-11 DIAGNOSIS — F79 MENTAL DEFICIENCY: ICD-10-CM

## 2025-08-11 DIAGNOSIS — E78.5 HYPERLIPIDEMIA, UNSPECIFIED HYPERLIPIDEMIA TYPE: ICD-10-CM

## 2025-08-11 DIAGNOSIS — R26.81 UNSTEADY GAIT: ICD-10-CM

## 2025-08-11 DIAGNOSIS — E11.22 TYPE 2 DIABETES MELLITUS WITH STAGE 3B CHRONIC KIDNEY DISEASE, WITHOUT LONG-TERM CURRENT USE OF INSULIN (HCC): ICD-10-CM

## 2025-08-11 DIAGNOSIS — M15.0 PRIMARY OSTEOARTHRITIS INVOLVING MULTIPLE JOINTS: ICD-10-CM

## 2025-08-11 DIAGNOSIS — E03.8 OTHER SPECIFIED HYPOTHYROIDISM: Primary | ICD-10-CM

## 2025-08-11 DIAGNOSIS — R13.10 DYSPHAGIA, UNSPECIFIED TYPE: ICD-10-CM

## 2025-08-11 LAB
HBA1C MFR BLD: 6.2 % (ref 4–5.6)
THYROXINE (T4): 6.2 UG/DL (ref 4.5–11.7)
TSH SERPL DL<=0.05 MIU/L-ACNC: 1.7 UIU/ML (ref 0.27–4.2)

## 2025-08-11 ASSESSMENT — ENCOUNTER SYMPTOMS
TROUBLE SWALLOWING: 0
EYE DISCHARGE: 0
NAUSEA: 0
COUGH: 0
DIARRHEA: 0
SINUS PAIN: 0
BLOOD IN STOOL: 0
EYE PAIN: 0
SINUS PRESSURE: 0
SHORTNESS OF BREATH: 0
SORE THROAT: 0
RHINORRHEA: 0
COLOR CHANGE: 0
VOMITING: 0
ABDOMINAL PAIN: 0
CHEST TIGHTNESS: 0
STRIDOR: 0
BACK PAIN: 0
EYE REDNESS: 0
CHOKING: 0
VOICE CHANGE: 0
FACIAL SWELLING: 0
RECTAL PAIN: 0
EYE ITCHING: 0
PHOTOPHOBIA: 0
ANAL BLEEDING: 0
CONSTIPATION: 0
ABDOMINAL DISTENTION: 0
WHEEZING: 0
APNEA: 0

## 2025-08-22 DIAGNOSIS — M15.0 PRIMARY OSTEOARTHRITIS INVOLVING MULTIPLE JOINTS: Primary | ICD-10-CM

## 2025-08-22 RX ORDER — TRAMADOL HYDROCHLORIDE 50 MG/1
50 TABLET ORAL EVERY 8 HOURS PRN
Qty: 90 TABLET | Refills: 3 | Status: SHIPPED | OUTPATIENT
Start: 2025-08-22 | End: 2025-12-20

## 2025-08-26 ENCOUNTER — HOSPITAL ENCOUNTER (EMERGENCY)
Age: 80
Discharge: HOME | End: 2025-08-26
Attending: EMERGENCY MEDICINE
Payer: MEDICARE

## 2025-08-26 ENCOUNTER — APPOINTMENT (OUTPATIENT)
Dept: CT IMAGING | Age: 80
End: 2025-08-26
Payer: MEDICARE

## 2025-08-26 VITALS
RESPIRATION RATE: 16 BRPM | SYSTOLIC BLOOD PRESSURE: 134 MMHG | OXYGEN SATURATION: 97 % | HEART RATE: 68 BPM | HEIGHT: 65 IN | WEIGHT: 246 LBS | BODY MASS INDEX: 40.98 KG/M2 | DIASTOLIC BLOOD PRESSURE: 64 MMHG

## 2025-08-26 DIAGNOSIS — S09.90XA INJURY OF HEAD, INITIAL ENCOUNTER: Primary | ICD-10-CM

## 2025-08-26 PROCEDURE — 70450 CT HEAD/BRAIN W/O DYE: CPT

## 2025-08-26 PROCEDURE — 99284 EMERGENCY DEPT VISIT MOD MDM: CPT

## 2025-08-26 ASSESSMENT — PAIN DESCRIPTION - ORIENTATION: ORIENTATION: RIGHT

## 2025-08-26 ASSESSMENT — PAIN DESCRIPTION - LOCATION: LOCATION: HEAD

## 2025-08-26 ASSESSMENT — PAIN DESCRIPTION - DESCRIPTORS: DESCRIPTORS: ACHING

## 2025-08-26 ASSESSMENT — PAIN SCALES - GENERAL: PAINLEVEL_OUTOF10: 3

## 2025-08-26 ASSESSMENT — PAIN - FUNCTIONAL ASSESSMENT: PAIN_FUNCTIONAL_ASSESSMENT: 0-10

## 2025-08-27 ENCOUNTER — CARE COORDINATION (OUTPATIENT)
Dept: CARE COORDINATION | Age: 80
End: 2025-08-27

## 2025-09-04 ENCOUNTER — OFFICE VISIT (OUTPATIENT)
Dept: FAMILY MEDICINE CLINIC | Age: 80
End: 2025-09-04

## 2025-09-04 VITALS
DIASTOLIC BLOOD PRESSURE: 76 MMHG | TEMPERATURE: 97.1 F | BODY MASS INDEX: 37.67 KG/M2 | SYSTOLIC BLOOD PRESSURE: 132 MMHG | HEIGHT: 65 IN | WEIGHT: 226.1 LBS | HEART RATE: 59 BPM | RESPIRATION RATE: 16 BRPM | OXYGEN SATURATION: 97 %

## 2025-09-04 DIAGNOSIS — N18.32 TYPE 2 DIABETES MELLITUS WITH STAGE 3B CHRONIC KIDNEY DISEASE, WITHOUT LONG-TERM CURRENT USE OF INSULIN (HCC): ICD-10-CM

## 2025-09-04 DIAGNOSIS — M15.0 PRIMARY OSTEOARTHRITIS INVOLVING MULTIPLE JOINTS: ICD-10-CM

## 2025-09-04 DIAGNOSIS — S00.03XD HEMATOMA OF SCALP, SUBSEQUENT ENCOUNTER: Primary | ICD-10-CM

## 2025-09-04 DIAGNOSIS — F79 MENTAL DEFICIENCY: ICD-10-CM

## 2025-09-04 DIAGNOSIS — R01.1 CARDIAC MURMUR, UNSPECIFIED: ICD-10-CM

## 2025-09-04 DIAGNOSIS — E11.22 TYPE 2 DIABETES MELLITUS WITH STAGE 3B CHRONIC KIDNEY DISEASE, WITHOUT LONG-TERM CURRENT USE OF INSULIN (HCC): ICD-10-CM

## 2025-09-04 DIAGNOSIS — I10 ESSENTIAL HYPERTENSION, MALIGNANT: ICD-10-CM

## 2025-09-04 ASSESSMENT — ENCOUNTER SYMPTOMS
SORE THROAT: 0
RECTAL PAIN: 0
BACK PAIN: 0
EYE DISCHARGE: 0
CHOKING: 0
VOICE CHANGE: 0
CHEST TIGHTNESS: 0
APNEA: 0
COLOR CHANGE: 0
VOMITING: 0
CONSTIPATION: 0
ABDOMINAL PAIN: 0
RHINORRHEA: 0
ABDOMINAL DISTENTION: 0
STRIDOR: 0
EYE ITCHING: 0
NAUSEA: 0
FACIAL SWELLING: 0
WHEEZING: 0
SINUS PRESSURE: 0
BLOOD IN STOOL: 0
PHOTOPHOBIA: 0
EYE PAIN: 0
COUGH: 0
EYE REDNESS: 0
SINUS PAIN: 0
ANAL BLEEDING: 0
DIARRHEA: 0
SHORTNESS OF BREATH: 0

## (undated) DEVICE — SPONGE GZ W4XL4IN RAYON POLY FILL CVR W/ NONWOVEN FAB

## (undated) DEVICE — GRADUATE TRIANG MEASURE 1000ML BLK PRNT